# Patient Record
Sex: FEMALE | Race: WHITE | NOT HISPANIC OR LATINO | Employment: OTHER | URBAN - METROPOLITAN AREA
[De-identification: names, ages, dates, MRNs, and addresses within clinical notes are randomized per-mention and may not be internally consistent; named-entity substitution may affect disease eponyms.]

---

## 2023-05-03 DIAGNOSIS — E07.9 THYROID DISORDER: ICD-10-CM

## 2023-05-03 RX ORDER — LEVOTHYROXINE SODIUM 125 UG/1
125 TABLET ORAL DAILY
Qty: 30 TABLET | Refills: 0 | Status: SHIPPED | OUTPATIENT
Start: 2023-05-03 | End: 2023-06-12 | Stop reason: SDUPTHER

## 2023-05-03 RX ORDER — LEVOTHYROXINE SODIUM 125 UG/1
125 TABLET ORAL DAILY
COMMUNITY
End: 2023-05-03 | Stop reason: SDUPTHER

## 2023-05-04 DIAGNOSIS — E11.9 TYPE 2 DIABETES MELLITUS WITHOUT COMPLICATION, WITHOUT LONG-TERM CURRENT USE OF INSULIN (MULTI): Primary | ICD-10-CM

## 2023-05-04 RX ORDER — GLIMEPIRIDE 2 MG/1
TABLET ORAL
Qty: 90 TABLET | Refills: 0 | Status: SHIPPED | OUTPATIENT
Start: 2023-05-04 | End: 2023-08-01

## 2023-06-12 DIAGNOSIS — E07.9 THYROID DISORDER: ICD-10-CM

## 2023-06-12 RX ORDER — LEVOTHYROXINE SODIUM 125 UG/1
125 TABLET ORAL DAILY
Qty: 30 TABLET | Refills: 0 | Status: SHIPPED | OUTPATIENT
Start: 2023-06-12 | End: 2023-07-17 | Stop reason: SDUPTHER

## 2023-07-03 ENCOUNTER — OFFICE VISIT (OUTPATIENT)
Dept: PRIMARY CARE | Facility: CLINIC | Age: 73
End: 2023-07-03
Payer: MEDICARE

## 2023-07-03 VITALS
HEART RATE: 92 BPM | WEIGHT: 202.6 LBS | BODY MASS INDEX: 34.59 KG/M2 | OXYGEN SATURATION: 99 % | RESPIRATION RATE: 18 BRPM | DIASTOLIC BLOOD PRESSURE: 86 MMHG | SYSTOLIC BLOOD PRESSURE: 132 MMHG | HEIGHT: 64 IN

## 2023-07-03 DIAGNOSIS — F32.A ANXIETY AND DEPRESSION: ICD-10-CM

## 2023-07-03 DIAGNOSIS — E11.69 TYPE 2 DIABETES MELLITUS WITH OTHER SPECIFIED COMPLICATION, WITHOUT LONG-TERM CURRENT USE OF INSULIN (MULTI): ICD-10-CM

## 2023-07-03 DIAGNOSIS — E03.9 ACQUIRED HYPOTHYROIDISM: ICD-10-CM

## 2023-07-03 DIAGNOSIS — M46.96 UNSPECIFIED INFLAMMATORY SPONDYLOPATHY, LUMBAR REGION (CMS-HCC): ICD-10-CM

## 2023-07-03 DIAGNOSIS — E55.9 HYPOVITAMINOSIS D: ICD-10-CM

## 2023-07-03 DIAGNOSIS — F32.0 DEPRESSION, MAJOR, SINGLE EPISODE, MILD (CMS-HCC): ICD-10-CM

## 2023-07-03 DIAGNOSIS — F41.9 ANXIETY AND DEPRESSION: ICD-10-CM

## 2023-07-03 DIAGNOSIS — E53.8 VITAMIN B12 DEFICIENCY: Primary | ICD-10-CM

## 2023-07-03 DIAGNOSIS — E78.2 MIXED HYPERLIPIDEMIA: ICD-10-CM

## 2023-07-03 DIAGNOSIS — Z86.718 PERSONAL HISTORY OF DVT (DEEP VEIN THROMBOSIS): ICD-10-CM

## 2023-07-03 DIAGNOSIS — C68.9 UROTHELIAL CARCINOMA (MULTI): ICD-10-CM

## 2023-07-03 DIAGNOSIS — Z00.00 MEDICARE ANNUAL WELLNESS VISIT, SUBSEQUENT: ICD-10-CM

## 2023-07-03 DIAGNOSIS — I74.3 EMBOLISM AND THROMBOSIS OF ARTERIES OF THE LOWER EXTREMITIES (MULTI): ICD-10-CM

## 2023-07-03 DIAGNOSIS — E07.9 THYROID DISORDER: ICD-10-CM

## 2023-07-03 DIAGNOSIS — Z00.00 ROUTINE GENERAL MEDICAL EXAMINATION AT HEALTH CARE FACILITY: ICD-10-CM

## 2023-07-03 PROBLEM — R13.10 DYSPHAGIA: Status: RESOLVED | Noted: 2023-07-03 | Resolved: 2023-07-03

## 2023-07-03 PROBLEM — I87.1 COMPRESSION OF VEIN: Status: RESOLVED | Noted: 2023-07-03 | Resolved: 2023-07-03

## 2023-07-03 PROBLEM — J30.9 ALLERGIC RHINITIS: Status: RESOLVED | Noted: 2023-07-03 | Resolved: 2023-07-03

## 2023-07-03 PROBLEM — R51.9 HEADACHE: Status: RESOLVED | Noted: 2023-07-03 | Resolved: 2023-07-03

## 2023-07-03 PROBLEM — S82.892A ANKLE FRACTURE, LEFT: Status: RESOLVED | Noted: 2023-07-03 | Resolved: 2023-07-03

## 2023-07-03 PROBLEM — I87.1 MAY-THURNER SYNDROME: Status: ACTIVE | Noted: 2023-07-03

## 2023-07-03 PROBLEM — R00.2 PALPITATIONS: Status: RESOLVED | Noted: 2023-07-03 | Resolved: 2023-07-03

## 2023-07-03 PROBLEM — K59.00 CONSTIPATION: Status: RESOLVED | Noted: 2023-07-03 | Resolved: 2023-07-03

## 2023-07-03 PROBLEM — G51.4 FACIAL TWITCHING: Status: RESOLVED | Noted: 2023-07-03 | Resolved: 2023-07-03

## 2023-07-03 PROBLEM — E11.9 TYPE 2 DIABETES MELLITUS (MULTI): Status: ACTIVE | Noted: 2023-07-03

## 2023-07-03 PROBLEM — M54.9 CHRONIC BACK PAIN: Status: ACTIVE | Noted: 2023-07-03

## 2023-07-03 PROBLEM — R06.09 DOE (DYSPNEA ON EXERTION): Status: RESOLVED | Noted: 2023-07-03 | Resolved: 2023-07-03

## 2023-07-03 PROBLEM — R92.8 ABNORMAL MAMMOGRAM: Status: RESOLVED | Noted: 2023-07-03 | Resolved: 2023-07-03

## 2023-07-03 PROBLEM — R19.8 IRREGULAR BOWEL HABITS: Status: RESOLVED | Noted: 2023-07-03 | Resolved: 2023-07-03

## 2023-07-03 PROBLEM — K82.8 GALLBLADDER SLUDGE: Status: RESOLVED | Noted: 2023-07-03 | Resolved: 2023-07-03

## 2023-07-03 PROBLEM — K21.9 ESOPHAGEAL REFLUX: Status: RESOLVED | Noted: 2023-07-03 | Resolved: 2023-07-03

## 2023-07-03 PROBLEM — B37.9 YEAST INFECTION: Status: RESOLVED | Noted: 2023-07-03 | Resolved: 2023-07-03

## 2023-07-03 PROBLEM — G89.29 CHRONIC BACK PAIN: Status: ACTIVE | Noted: 2023-07-03

## 2023-07-03 PROBLEM — R91.8 MULTIPLE LUNG NODULES: Status: ACTIVE | Noted: 2023-07-03

## 2023-07-03 PROBLEM — M79.7 FIBROMYALGIA: Status: RESOLVED | Noted: 2023-07-03 | Resolved: 2023-07-03

## 2023-07-03 PROBLEM — R10.9 ABDOMINAL CRAMPING: Status: RESOLVED | Noted: 2023-07-03 | Resolved: 2023-07-03

## 2023-07-03 LAB — POC HEMOGLOBIN A1C: 6 % (ref 4.2–6.5)

## 2023-07-03 PROCEDURE — 1125F AMNT PAIN NOTED PAIN PRSNT: CPT | Performed by: NURSE PRACTITIONER

## 2023-07-03 PROCEDURE — 1036F TOBACCO NON-USER: CPT | Performed by: NURSE PRACTITIONER

## 2023-07-03 PROCEDURE — 1170F FXNL STATUS ASSESSED: CPT | Performed by: NURSE PRACTITIONER

## 2023-07-03 PROCEDURE — 83036 HEMOGLOBIN GLYCOSYLATED A1C: CPT | Performed by: NURSE PRACTITIONER

## 2023-07-03 PROCEDURE — G0439 PPPS, SUBSEQ VISIT: HCPCS | Performed by: NURSE PRACTITIONER

## 2023-07-03 PROCEDURE — 1159F MED LIST DOCD IN RCRD: CPT | Performed by: NURSE PRACTITIONER

## 2023-07-03 PROCEDURE — 3044F HG A1C LEVEL LT 7.0%: CPT | Performed by: NURSE PRACTITIONER

## 2023-07-03 PROCEDURE — 3075F SYST BP GE 130 - 139MM HG: CPT | Performed by: NURSE PRACTITIONER

## 2023-07-03 PROCEDURE — 3008F BODY MASS INDEX DOCD: CPT | Performed by: NURSE PRACTITIONER

## 2023-07-03 PROCEDURE — 1157F ADVNC CARE PLAN IN RCRD: CPT | Performed by: NURSE PRACTITIONER

## 2023-07-03 PROCEDURE — 99214 OFFICE O/P EST MOD 30 MIN: CPT | Performed by: NURSE PRACTITIONER

## 2023-07-03 PROCEDURE — 1160F RVW MEDS BY RX/DR IN RCRD: CPT | Performed by: NURSE PRACTITIONER

## 2023-07-03 PROCEDURE — 3079F DIAST BP 80-89 MM HG: CPT | Performed by: NURSE PRACTITIONER

## 2023-07-03 RX ORDER — MELATONIN 3 MG
3 CAPSULE ORAL NIGHTLY PRN
COMMUNITY
End: 2023-11-16 | Stop reason: ALTCHOICE

## 2023-07-03 RX ORDER — ESCITALOPRAM OXALATE 5 MG/1
5 TABLET ORAL DAILY
Qty: 30 TABLET | Refills: 2 | Status: SHIPPED | OUTPATIENT
Start: 2023-07-03 | End: 2023-11-16 | Stop reason: ALTCHOICE

## 2023-07-03 RX ORDER — PANTOPRAZOLE SODIUM 40 MG/1
40 TABLET, DELAYED RELEASE ORAL
COMMUNITY
Start: 2021-10-19

## 2023-07-03 RX ORDER — CETIRIZINE HYDROCHLORIDE 10 MG/1
1 TABLET ORAL DAILY
COMMUNITY
Start: 2022-06-03 | End: 2023-11-16 | Stop reason: ALTCHOICE

## 2023-07-03 RX ORDER — APIXABAN 5 MG/1
2.5 TABLET, FILM COATED ORAL 2 TIMES DAILY
COMMUNITY
Start: 2021-02-22

## 2023-07-03 RX ORDER — BLOOD SUGAR DIAGNOSTIC
STRIP MISCELLANEOUS
COMMUNITY
Start: 2020-02-19 | End: 2023-08-21 | Stop reason: SDUPTHER

## 2023-07-03 NOTE — PROGRESS NOTES
"Subjective   Patient ID: Latosha Larson is a 73 y.o. female who presents for Follow-up (Patient in today routine F/U and A1C check. She would also like to discuss fatigue as well as trouble staying asleep. ).    Diabetes Mellitus  Patient presents for follow up of diabetes. Current symptoms include: none. Symptoms have been well-controlled. Patient denies foot ulcerations, polydipsia, polyuria, and weight loss. Evaluation to date has included: hemoglobin A1C.  Home sugars: BGs consistently in an acceptable range. Current treatment: none. Last dilated eye exam: within the past year.  She will take glimepride if her sugar is 190 and she knows she will have carbs at her dinner.      She has been following urology for bladder cancer - did have blue light cystoscopy and re - TURBT and has had BCG.  Stated urology found another area on her bladder and she will be having surgery on 8/1/23    She has found that since last year she has had a decrease in motivation.  She has noticed that since her broken ankle and with dealing with her bladder cancer she has not wanted to do the things that she liked to do.  Denies Si         Review of Systems   All other systems reviewed and are negative.      Objective   /86   Pulse 92   Resp 18   Ht 1.626 m (5' 4\")   Wt 91.9 kg (202 lb 9.6 oz)   SpO2 99%   BMI 34.78 kg/m²     Physical Exam  Vitals and nursing note reviewed.   Constitutional:       General: She is not in acute distress.     Appearance: Normal appearance.   HENT:      Head: Normocephalic and atraumatic.      Right Ear: External ear normal.      Left Ear: External ear normal.      Mouth/Throat:      Mouth: Mucous membranes are moist.      Pharynx: Oropharynx is clear.   Eyes:      Extraocular Movements: Extraocular movements intact.      Conjunctiva/sclera: Conjunctivae normal.      Pupils: Pupils are equal, round, and reactive to light.   Neck:      Vascular: No carotid bruit.   Cardiovascular:      Rate and " Rhythm: Normal rate and regular rhythm.      Pulses: Normal pulses.      Heart sounds: Normal heart sounds.   Pulmonary:      Effort: Pulmonary effort is normal.      Breath sounds: Normal breath sounds.   Abdominal:      General: Bowel sounds are normal.      Palpations: Abdomen is soft.   Musculoskeletal:      Cervical back: Normal range of motion and neck supple.   Lymphadenopathy:      Cervical: No cervical adenopathy.   Skin:     General: Skin is warm and dry.      Capillary Refill: Capillary refill takes less than 2 seconds.   Neurological:      General: No focal deficit present.      Mental Status: She is alert and oriented to person, place, and time. Mental status is at baseline.   Psychiatric:         Mood and Affect: Mood normal.         Behavior: Behavior normal.         Thought Content: Thought content normal.         Judgment: Judgment normal.         Assessment/Plan   Problem List Items Addressed This Visit       Mixed hyperlipidemia    Type 2 diabetes mellitus (CMS/HCC)    Relevant Orders    POCT glycosylated hemoglobin (Hb A1C) manually resulted (Completed)    CBC    Comprehensive Metabolic Panel    Lipid Panel    Iron and TIBC    Vitamin B12 deficiency - Primary    Relevant Orders    Vitamin B12    Iron and TIBC     Other Visit Diagnoses       Acquired hypothyroidism        Relevant Orders    TSH with reflex to Free T4 if abnormal    Iron and TIBC    Hypovitaminosis D        Relevant Orders    Vitamin D, Total    Iron and TIBC    Anxiety and depression        Relevant Medications    escitalopram (Lexapro) 5 mg tablet

## 2023-07-03 NOTE — PATIENT INSTRUCTIONS
I want to check lab work to see if anything is going on that we need to look at  I also want to start medication to help with your motivation - called lexapro.  Take it at the same time everyday so that you get used to taking it  Follow up in 6 weeks

## 2023-07-07 ENCOUNTER — APPOINTMENT (OUTPATIENT)
Dept: LAB | Facility: LAB | Age: 73
End: 2023-07-07
Payer: MEDICARE

## 2023-07-09 PROBLEM — F32.A ANXIETY AND DEPRESSION: Status: ACTIVE | Noted: 2023-07-09

## 2023-07-09 PROBLEM — Z00.00 MEDICARE ANNUAL WELLNESS VISIT, SUBSEQUENT: Status: ACTIVE | Noted: 2023-07-09

## 2023-07-09 PROBLEM — E03.9 ACQUIRED HYPOTHYROIDISM: Status: ACTIVE | Noted: 2023-07-09

## 2023-07-09 PROBLEM — M46.96 UNSPECIFIED INFLAMMATORY SPONDYLOPATHY, LUMBAR REGION (CMS-HCC): Status: ACTIVE | Noted: 2023-07-09

## 2023-07-09 PROBLEM — I74.3 EMBOLISM AND THROMBOSIS OF ARTERIES OF THE LOWER EXTREMITIES (MULTI): Status: ACTIVE | Noted: 2023-07-09

## 2023-07-09 PROBLEM — F41.9 ANXIETY AND DEPRESSION: Status: ACTIVE | Noted: 2023-07-09

## 2023-07-09 ASSESSMENT — ACTIVITIES OF DAILY LIVING (ADL)
DRESSING: INDEPENDENT
TAKING_MEDICATION: INDEPENDENT
BATHING: INDEPENDENT
MANAGING_FINANCES: INDEPENDENT
GROCERY_SHOPPING: INDEPENDENT
DOING_HOUSEWORK: INDEPENDENT

## 2023-07-09 ASSESSMENT — PATIENT HEALTH QUESTIONNAIRE - PHQ9
1. LITTLE INTEREST OR PLEASURE IN DOING THINGS: SEVERAL DAYS
2. FEELING DOWN, DEPRESSED OR HOPELESS: SEVERAL DAYS
10. IF YOU CHECKED OFF ANY PROBLEMS, HOW DIFFICULT HAVE THESE PROBLEMS MADE IT FOR YOU TO DO YOUR WORK, TAKE CARE OF THINGS AT HOME, OR GET ALONG WITH OTHER PEOPLE: SOMEWHAT DIFFICULT
SUM OF ALL RESPONSES TO PHQ9 QUESTIONS 1 AND 2: 2

## 2023-07-10 NOTE — PROGRESS NOTES
"Subjective   Reason for Visit: Latosha Larson is an 73 y.o. female here for a Medicare Wellness visit.     Past Medical, Surgical, and Family History reviewed and updated in chart.    Reviewed all medications by prescribing practitioner or clinical pharmacist (such as prescriptions, OTCs, herbal therapies and supplements) and documented in the medical record.    See other note for HPI    Presents for follow up and AMW    Colonoscopy 2021  Mammogram 2022  Dexa 2021 and normal   PPSV 2017        Patient Care Team:  FAITH Villarreal as PCP - General  FAITH Villarreal as PCP - [employer] PCP     Review of Systems   All other systems reviewed and are negative.      Objective   Vitals:  /86   Pulse 92   Resp 18   Ht 1.626 m (5' 4\")   Wt 91.9 kg (202 lb 9.6 oz)   SpO2 99%   BMI 34.78 kg/m²       Physical Exam  Vitals and nursing note reviewed.   Constitutional:       General: She is not in acute distress.     Appearance: Normal appearance.   HENT:      Head: Normocephalic and atraumatic.      Right Ear: External ear normal.      Left Ear: External ear normal.      Nose: Nose normal.      Mouth/Throat:      Mouth: Mucous membranes are moist.      Pharynx: Oropharynx is clear.   Eyes:      Extraocular Movements: Extraocular movements intact.      Conjunctiva/sclera: Conjunctivae normal.      Pupils: Pupils are equal, round, and reactive to light.   Cardiovascular:      Rate and Rhythm: Normal rate and regular rhythm.      Pulses: Normal pulses.      Heart sounds: Normal heart sounds.   Pulmonary:      Effort: Pulmonary effort is normal.      Breath sounds: Normal breath sounds.   Skin:     General: Skin is warm and dry.   Neurological:      General: No focal deficit present.      Mental Status: She is alert and oriented to person, place, and time. Mental status is at baseline.   Psychiatric:         Mood and Affect: Mood normal.         Behavior: Behavior normal.         " Thought Content: Thought content normal.         Judgment: Judgment normal.         Assessment/Plan   Problem List Items Addressed This Visit       Depression, major, single episode, mild (CMS/HCC)    Overview     Has been having increased feelings of not wanting to do things that she wants like to do  Suspected recurrence of bladder cancer has weighed heavily on her mood  Start escitalopram  Side effects discussed  Follow-up in 6 weeks         Current Assessment & Plan     Has been having increased feelings of not wanting to do things that she wants like to do  Suspected recurrence of bladder cancer has weighed heavily on her mood  Start escitalopram  Side effects discussed  Follow-up in 6 weeks         Thyroid disorder    Overview     Stable  TSH  We will adjust levothyroxine based on results         Current Assessment & Plan     Stable  TSH  We will adjust levothyroxine based on results         Mixed hyperlipidemia    Overview     Stable  Lipid panel         Current Assessment & Plan     Stable  Lipid panel         Personal history of DVT (deep vein thrombosis)    Overview     Stable  Continue Eliquis twice daily         Current Assessment & Plan     Stable  Continue Eliquis twice daily         Type 2 diabetes mellitus (CMS/HCC)    Overview     A1c 6.0  Continue glimepiride  Continue to monitor sugars  Continue to monitor diet  CBC, CMP, lipid panel  Follow-up in 6 months         Current Assessment & Plan     A1c 6.0  Continue glimepiride  Continue to monitor sugars  Continue to monitor diet  CBC, CMP, lipid panel  Follow-up in 6 months         Relevant Orders    POCT glycosylated hemoglobin (Hb A1C) manually resulted (Completed)    CBC    Comprehensive Metabolic Panel    Lipid Panel    Iron and TIBC    Urothelial carcinoma (CMS/HCC)    Overview     Follow-up with urology  Did find an area and will be having surgery in August         Current Assessment & Plan     Follow-up with urology  Did find an area and will  be having surgery in August         Vitamin B12 deficiency - Primary    Relevant Orders    Vitamin B12    Iron and TIBC    Unspecified inflammatory spondylopathy, lumbar region (CMS/HCC)    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)    Current Assessment & Plan     Stable  Continue eliquis twice a day         Anxiety and depression    Relevant Medications    escitalopram (Lexapro) 5 mg tablet    Acquired hypothyroidism    Relevant Orders    TSH with reflex to Free T4 if abnormal    Iron and TIBC    Medicare annual wellness visit, subsequent    Overview     - UTD with vaccine   -  colonoscopy 2022  - mammogram 2022  - dexa 2021 -  living will reviewed with patient           Current Assessment & Plan     - UTD with vaccine   -  colonoscopy 2022  - mammogram 2022  - dexa 2021  -  living will reviewed with patient          Other Visit Diagnoses       Hypovitaminosis D        Relevant Orders    Vitamin D, Total    Iron and TIBC    Routine general medical examination at health care facility

## 2023-07-10 NOTE — ASSESSMENT & PLAN NOTE
- UTD with vaccine   -  colonoscopy 2022  - mammogram 2022  - dexa 2021  -  living will reviewed with patient

## 2023-07-10 NOTE — ASSESSMENT & PLAN NOTE
A1c 6.0  Continue glimepiride  Continue to monitor sugars  Continue to monitor diet  CBC, CMP, lipid panel  Follow-up in 6 months

## 2023-07-10 NOTE — ASSESSMENT & PLAN NOTE
Has been having increased feelings of not wanting to do things that she wants like to do  Suspected recurrence of bladder cancer has weighed heavily on her mood  Start escitalopram  Side effects discussed  Follow-up in 6 weeks

## 2023-07-11 ENCOUNTER — LAB (OUTPATIENT)
Dept: LAB | Facility: LAB | Age: 73
End: 2023-07-11
Payer: MEDICARE

## 2023-07-11 DIAGNOSIS — E53.8 VITAMIN B12 DEFICIENCY: ICD-10-CM

## 2023-07-11 DIAGNOSIS — E55.9 HYPOVITAMINOSIS D: ICD-10-CM

## 2023-07-11 DIAGNOSIS — E11.69 TYPE 2 DIABETES MELLITUS WITH OTHER SPECIFIED COMPLICATION, WITHOUT LONG-TERM CURRENT USE OF INSULIN (MULTI): ICD-10-CM

## 2023-07-11 DIAGNOSIS — E03.9 ACQUIRED HYPOTHYROIDISM: ICD-10-CM

## 2023-07-11 LAB
ALANINE AMINOTRANSFERASE (SGPT) (U/L) IN SER/PLAS: 18 U/L (ref 7–45)
ALBUMIN (G/DL) IN SER/PLAS: 4.3 G/DL (ref 3.4–5)
ALKALINE PHOSPHATASE (U/L) IN SER/PLAS: 62 U/L (ref 33–136)
ANION GAP IN SER/PLAS: 14 MMOL/L (ref 10–20)
ASPARTATE AMINOTRANSFERASE (SGOT) (U/L) IN SER/PLAS: 17 U/L (ref 9–39)
BILIRUBIN TOTAL (MG/DL) IN SER/PLAS: 0.9 MG/DL (ref 0–1.2)
CALCIDIOL (25 OH VITAMIN D3) (NG/ML) IN SER/PLAS: 41 NG/ML
CALCIUM (MG/DL) IN SER/PLAS: 10.6 MG/DL (ref 8.6–10.6)
CARBON DIOXIDE, TOTAL (MMOL/L) IN SER/PLAS: 22 MMOL/L (ref 21–32)
CHLORIDE (MMOL/L) IN SER/PLAS: 108 MMOL/L (ref 98–107)
CHOLESTEROL (MG/DL) IN SER/PLAS: 208 MG/DL (ref 0–199)
CHOLESTEROL IN HDL (MG/DL) IN SER/PLAS: 39.6 MG/DL
CHOLESTEROL/HDL RATIO: 5.3
COBALAMIN (VITAMIN B12) (PG/ML) IN SER/PLAS: 686 PG/ML (ref 211–911)
CREATININE (MG/DL) IN SER/PLAS: 0.95 MG/DL (ref 0.5–1.05)
ERYTHROCYTE DISTRIBUTION WIDTH (RATIO) BY AUTOMATED COUNT: 14.1 % (ref 11.5–14.5)
ERYTHROCYTE MEAN CORPUSCULAR HEMOGLOBIN CONCENTRATION (G/DL) BY AUTOMATED: 32.1 G/DL (ref 32–36)
ERYTHROCYTE MEAN CORPUSCULAR VOLUME (FL) BY AUTOMATED COUNT: 94 FL (ref 80–100)
ERYTHROCYTES (10*6/UL) IN BLOOD BY AUTOMATED COUNT: 4.39 X10E12/L (ref 4–5.2)
GFR FEMALE: 63 ML/MIN/1.73M2
GLUCOSE (MG/DL) IN SER/PLAS: 166 MG/DL (ref 74–99)
HEMATOCRIT (%) IN BLOOD BY AUTOMATED COUNT: 41.4 % (ref 36–46)
HEMOGLOBIN (G/DL) IN BLOOD: 13.3 G/DL (ref 12–16)
IRON (UG/DL) IN SER/PLAS: 113 UG/DL (ref 35–150)
IRON BINDING CAPACITY (UG/DL) IN SER/PLAS: 326 UG/DL (ref 240–445)
IRON SATURATION (%) IN SER/PLAS: 35 % (ref 25–45)
LDL: 113 MG/DL (ref 0–99)
LEUKOCYTES (10*3/UL) IN BLOOD BY AUTOMATED COUNT: 7.1 X10E9/L (ref 4.4–11.3)
NON HDL CHOLESTEROL: 168 MG/DL
NRBC (PER 100 WBCS) BY AUTOMATED COUNT: 0 /100 WBC (ref 0–0)
PLATELETS (10*3/UL) IN BLOOD AUTOMATED COUNT: 324 X10E9/L (ref 150–450)
POTASSIUM (MMOL/L) IN SER/PLAS: 4.5 MMOL/L (ref 3.5–5.3)
PROTEIN TOTAL: 6.6 G/DL (ref 6.4–8.2)
SODIUM (MMOL/L) IN SER/PLAS: 139 MMOL/L (ref 136–145)
THYROTROPIN (MIU/L) IN SER/PLAS BY DETECTION LIMIT <= 0.05 MIU/L: 0.27 MIU/L (ref 0.44–3.98)
THYROXINE (T4) FREE (NG/DL) IN SER/PLAS: 1.68 NG/DL (ref 0.78–1.48)
TRIGLYCERIDE (MG/DL) IN SER/PLAS: 275 MG/DL (ref 0–149)
UREA NITROGEN (MG/DL) IN SER/PLAS: 14 MG/DL (ref 6–23)
VLDL: 55 MG/DL (ref 0–40)

## 2023-07-11 PROCEDURE — 83540 ASSAY OF IRON: CPT

## 2023-07-11 PROCEDURE — 80053 COMPREHEN METABOLIC PANEL: CPT

## 2023-07-11 PROCEDURE — 84439 ASSAY OF FREE THYROXINE: CPT

## 2023-07-11 PROCEDURE — 36415 COLL VENOUS BLD VENIPUNCTURE: CPT

## 2023-07-11 PROCEDURE — 83550 IRON BINDING TEST: CPT

## 2023-07-11 PROCEDURE — 82607 VITAMIN B-12: CPT

## 2023-07-11 PROCEDURE — 85027 COMPLETE CBC AUTOMATED: CPT

## 2023-07-11 PROCEDURE — 82306 VITAMIN D 25 HYDROXY: CPT

## 2023-07-11 PROCEDURE — 84443 ASSAY THYROID STIM HORMONE: CPT

## 2023-07-11 PROCEDURE — 80061 LIPID PANEL: CPT

## 2023-07-17 DIAGNOSIS — E07.9 THYROID DISORDER: ICD-10-CM

## 2023-07-17 RX ORDER — LEVOTHYROXINE SODIUM 125 UG/1
TABLET ORAL
Qty: 90 TABLET | Refills: 1 | Status: SHIPPED | OUTPATIENT
Start: 2023-07-17 | End: 2024-02-27 | Stop reason: ALTCHOICE

## 2023-08-01 ENCOUNTER — HOSPITAL ENCOUNTER (OUTPATIENT)
Dept: DATA CONVERSION | Facility: HOSPITAL | Age: 73
End: 2023-08-01
Attending: STUDENT IN AN ORGANIZED HEALTH CARE EDUCATION/TRAINING PROGRAM | Admitting: STUDENT IN AN ORGANIZED HEALTH CARE EDUCATION/TRAINING PROGRAM
Payer: MEDICARE

## 2023-08-01 DIAGNOSIS — E11.9 TYPE 2 DIABETES MELLITUS WITHOUT COMPLICATION, WITHOUT LONG-TERM CURRENT USE OF INSULIN (MULTI): ICD-10-CM

## 2023-08-01 DIAGNOSIS — C67.3 MALIGNANT NEOPLASM OF ANTERIOR WALL OF BLADDER (MULTI): ICD-10-CM

## 2023-08-01 DIAGNOSIS — C67.2 MALIGNANT NEOPLASM OF LATERAL WALL OF BLADDER (MULTI): ICD-10-CM

## 2023-08-01 LAB — POCT GLUCOSE: 186 MG/DL (ref 74–99)

## 2023-08-01 RX ORDER — GLIMEPIRIDE 2 MG/1
TABLET ORAL
Qty: 90 TABLET | Refills: 1 | Status: SHIPPED | OUTPATIENT
Start: 2023-08-01 | End: 2024-03-05 | Stop reason: HOSPADM

## 2023-08-03 ENCOUNTER — TELEPHONE (OUTPATIENT)
Dept: PRIMARY CARE | Facility: CLINIC | Age: 73
End: 2023-08-03
Payer: MEDICARE

## 2023-08-03 DIAGNOSIS — F32.0 DEPRESSION, MAJOR, SINGLE EPISODE, MILD (CMS-HCC): Primary | ICD-10-CM

## 2023-08-03 RX ORDER — SERTRALINE HYDROCHLORIDE 25 MG/1
25 TABLET, FILM COATED ORAL DAILY
Qty: 30 TABLET | Refills: 1 | Status: SHIPPED | OUTPATIENT
Start: 2023-08-03 | End: 2023-10-05 | Stop reason: SDUPTHER

## 2023-08-09 LAB
COMPLETE PATHOLOGY REPORT: NORMAL
CONVERTED CLINICAL DIAGNOSIS-HISTORY: NORMAL
CONVERTED DIAGNOSIS COMMENT: NORMAL
CONVERTED FINAL DIAGNOSIS: NORMAL
CONVERTED FINAL REPORT PDF LINK TO COPY AND PASTE: NORMAL
CONVERTED GROSS DESCRIPTION: NORMAL

## 2023-08-21 DIAGNOSIS — E11.69 TYPE 2 DIABETES MELLITUS WITH OTHER SPECIFIED COMPLICATION, WITHOUT LONG-TERM CURRENT USE OF INSULIN (MULTI): ICD-10-CM

## 2023-08-21 RX ORDER — BLOOD SUGAR DIAGNOSTIC
STRIP MISCELLANEOUS
Qty: 200 STRIP | Refills: 1 | Status: SHIPPED | OUTPATIENT
Start: 2023-08-21 | End: 2024-02-19

## 2023-09-29 VITALS — HEIGHT: 64 IN | WEIGHT: 199.74 LBS | BODY MASS INDEX: 34.1 KG/M2

## 2023-09-30 NOTE — H&P
History & Physical Reviewed:   I have reviewed the History and Physical dated:  14-Jul-2023   History and Physical reviewed and relevant findings noted. Patient examined to review pertinent physical  findings.: No significant changes   Home Medications Reviewed: no changes noted   Allergies Reviewed: no changes noted       ERAS (Enhanced Recovery After Surgery):  ·  ERAS Patient: no     Consent:   COVID-19 Consent:  ·  COVID-19 Risk Consent Surgeon has reviewed key risks related to the risk of valerie COVID-19 and if they contract COVID-19 what the risks are.       Electronic Signatures:  Janell Etienne (PA (PHYSICIAN))  (Signed 31-Jul-2023 07:45)   Authored: History & Physical Reviewed, ERAS, Consent,  Note Completion      Last Updated: 31-Jul-2023 07:45 by Janell Etienne (PA (PHYSICIAN))

## 2023-09-30 NOTE — H&P
History & Physical Reviewed:   I have reviewed the History and Physical dated:  01-Aug-2023   History and Physical reviewed and relevant findings noted. Patient examined to review pertinent physical  findings.: No significant changes   Home Medications Reviewed: no changes noted   Allergies Reviewed: no changes noted       ERAS (Enhanced Recovery After Surgery):  ·  ERAS Patient: no     Consent:   COVID-19 Consent:  ·  COVID-19 Risk Consent Surgeon has reviewed key risks related to the risk of valerie COVID-19 and if they contract COVID-19 what the risks are.     Attestation:   Note Completion:  I am a:  Resident/Fellow   Attending Attestation I saw and evaluated the patient.  I personally obtained the key and critical portions of the history and physical exam or was physically present for key and  critical portions performed by the resident/fellow. I reviewed the resident/fellow?s documentation and discussed the patient with the resident/fellow.  I agree with the resident/fellow?s medical decision making as documented in the note.     I personally evaluated the patient on 01-Aug-2023         Electronic Signatures:  Hardeep Acuña (Resident))  (Signed 01-Aug-2023 10:13)   Authored: History & Physical Reviewed, ERAS, Consent,  Note Completion  Stefany Phipps)  (Signed 04-Aug-2023 12:55)   Authored: Note Completion   Co-Signer: History & Physical Reviewed, ERAS, Consent, Note Completion      Last Updated: 04-Aug-2023 12:55 by Stefany Phipps)

## 2023-10-01 NOTE — OP NOTE
PROCEDURE DETAILS    Preoperative Diagnosis:  Bladder cancer  Postoperative Diagnosis:  Bladder cancer  Surgeon: Stefany Phipps  Resident/Fellow/Other Assistant: Hardeep Acuña    Procedure:  1. INTRAVESICAL INSTILLATION OF CYSVIEW  2. BLUE LIGHT CYSTOSCOPY  3. TRANSURETHRAL RESECTION OF BLADDER TUMOR ( TURBT)     Anesthesia: [place] Dash  Estimated Blood Loss: 0  Findings: Reactive area on left lateral wall 2 cm in size  Small lesion on right anterior wall  Specimens(s) Collected: yes,  left posterolateral wall tumor  anterior bladder biopsy   Complications: none  Drains and/or Catheters: 16 Fr Busch  Patient Returned To/Condition: PACU/stable        Operative Report:   Patient was  consented and antibiotics were started on-call to the OR.  In the preop area, a 16 Fr Busch catheter was placed, bladder decompressed, then 100mg hexaminolevulonic acid distilled in 50cc NSS were instilled in the bladder, and Busch was clamped.  In the operating room, under general anesthesia, patient placed in dorsolithotomy position, Busch catheter removed and genitalia was prepped and draped in usual manner.  No 22 cystoscope was inserted down the urethra into the bladder, and cystoscopy revealed the area of prior resection on the posterior wall. There was a 2cm erythematous area with papillary growth on the left posterolateral wall, and another small area  on the anterior wall, both had hyperactivity on the blue light.  Using the 26 resectoscope, with the loop on the working element, the area of tumor was resected down to the muscle and fat layer.  This was done the whole surface of the tumor. Another resection was performed for the minimal lesion on the anterior bladder  wall. Extensive hemostasis was performed for the underlying base of the tumor as well as the surrounding mucosa.  All resected chips were removed and sent for pathological examination.   No 16 French Busch catheter was inserted.  Patient was awakened  and transferred to PACU in stable condition.                            Electronic Signatures:  Stefany Phipps)  (Signed 01-Aug-2023 13:53)   Authored: Post-Operative Note, Chart Review, Note Completion      Last Updated: 01-Aug-2023 13:53 by Stefany Phipps)

## 2023-10-03 ENCOUNTER — CLINICAL SUPPORT (OUTPATIENT)
Dept: UROLOGY | Facility: CLINIC | Age: 73
End: 2023-10-03
Payer: MEDICARE

## 2023-10-03 VITALS — TEMPERATURE: 97.7 F

## 2023-10-03 DIAGNOSIS — C67.9 MALIGNANT NEOPLASM OF URINARY BLADDER, UNSPECIFIED SITE (MULTI): Primary | ICD-10-CM

## 2023-10-03 LAB
POC APPEARANCE, URINE: CLEAR
POC BILIRUBIN, URINE: NEGATIVE
POC BLOOD, URINE: ABNORMAL
POC COLOR, URINE: YELLOW
POC GLUCOSE, URINE: NEGATIVE MG/DL
POC KETONES, URINE: NEGATIVE MG/DL
POC LEUKOCYTES, URINE: NEGATIVE
POC NITRITE,URINE: NEGATIVE
POC PH, URINE: 5.5 PH
POC PROTEIN, URINE: NEGATIVE MG/DL
POC SPECIFIC GRAVITY, URINE: 1.01
POC UROBILINOGEN, URINE: 0.2 EU/DL

## 2023-10-03 PROCEDURE — 51720 TREATMENT OF BLADDER LESION: CPT | Performed by: STUDENT IN AN ORGANIZED HEALTH CARE EDUCATION/TRAINING PROGRAM

## 2023-10-05 DIAGNOSIS — F32.0 DEPRESSION, MAJOR, SINGLE EPISODE, MILD (CMS-HCC): ICD-10-CM

## 2023-10-05 RX ORDER — SERTRALINE HYDROCHLORIDE 25 MG/1
25 TABLET, FILM COATED ORAL DAILY
Qty: 90 TABLET | Refills: 0 | Status: SHIPPED | OUTPATIENT
Start: 2023-10-05 | End: 2023-11-16 | Stop reason: ALTCHOICE

## 2023-10-08 PROBLEM — J32.9 CHRONIC SINUSITIS: Status: ACTIVE | Noted: 2023-10-08

## 2023-10-08 PROBLEM — N17.9 ACUTE RENAL FAILURE SYNDROME (CMS-HCC): Status: ACTIVE | Noted: 2023-10-08

## 2023-10-08 PROBLEM — G47.01 INSOMNIA DUE TO MEDICAL CONDITION: Status: ACTIVE | Noted: 2023-10-08

## 2023-10-08 PROBLEM — S93.06XA CLOSED DISLOCATION OF ANKLE: Status: ACTIVE | Noted: 2023-10-08

## 2023-10-08 PROBLEM — N32.89 IRRITABLE BLADDER: Status: ACTIVE | Noted: 2023-10-08

## 2023-10-08 PROBLEM — H52.7 REFRACTIVE ERRORS: Status: ACTIVE | Noted: 2023-10-08

## 2023-10-08 PROBLEM — A41.9 SEPSIS (MULTI): Status: ACTIVE | Noted: 2023-10-08

## 2023-10-08 PROBLEM — E66.9 CLASS 1 OBESITY WITH BODY MASS INDEX (BMI) OF 34.0 TO 34.9 IN ADULT: Status: ACTIVE | Noted: 2023-10-08

## 2023-10-08 PROBLEM — M1A.09X0 IDIOPATHIC CHRONIC GOUT OF MULTIPLE SITES WITHOUT TOPHUS: Status: ACTIVE | Noted: 2023-10-08

## 2023-10-08 PROBLEM — M70.51 BURSITIS OF RIGHT KNEE: Status: ACTIVE | Noted: 2023-10-08

## 2023-10-08 PROBLEM — Z96.1 ARTIFICIAL LENS PRESENT: Status: ACTIVE | Noted: 2023-10-08

## 2023-10-08 PROBLEM — K58.0 IRRITABLE BOWEL SYNDROME WITH DIARRHEA: Status: ACTIVE | Noted: 2023-10-08

## 2023-10-08 PROBLEM — F41.9 ANXIETY: Status: ACTIVE | Noted: 2023-10-08

## 2023-10-08 PROBLEM — D84.1: Status: ACTIVE | Noted: 2023-10-08

## 2023-10-08 PROBLEM — M35.9 SYSTEMIC INVOLVEMENT OF CONNECTIVE TISSUE, UNSPECIFIED (MULTI): Status: ACTIVE | Noted: 2023-10-08

## 2023-10-08 PROBLEM — E83.52 HYPERCALCEMIA: Status: ACTIVE | Noted: 2023-10-08

## 2023-10-08 PROBLEM — R76.0 RAISED ANTIBODY TITER: Status: ACTIVE | Noted: 2023-10-08

## 2023-10-08 PROBLEM — E66.09 OTHER OBESITY DUE TO EXCESS CALORIES: Status: ACTIVE | Noted: 2023-10-08

## 2023-10-08 PROBLEM — N32.89 BLADDER MASS: Status: ACTIVE | Noted: 2023-10-08

## 2023-10-08 PROBLEM — R73.09 BLOOD GLUCOSE ABNORMAL: Status: ACTIVE | Noted: 2023-10-08

## 2023-10-08 PROBLEM — R41.3 MEMORY DIFFICULTY: Status: ACTIVE | Noted: 2023-10-08

## 2023-10-08 PROBLEM — E06.3 HASHIMOTO'S DISEASE: Status: ACTIVE | Noted: 2023-10-08

## 2023-10-08 PROBLEM — N20.0 KIDNEY STONE: Status: ACTIVE | Noted: 2023-10-08

## 2023-10-08 PROBLEM — E66.811 CLASS 1 OBESITY WITH BODY MASS INDEX (BMI) OF 34.0 TO 34.9 IN ADULT: Status: ACTIVE | Noted: 2023-10-08

## 2023-10-08 PROBLEM — M41.80 DEXTROSCOLIOSIS: Status: ACTIVE | Noted: 2023-10-08

## 2023-10-08 PROBLEM — R53.82 CHRONIC FATIGUE: Status: ACTIVE | Noted: 2023-10-08

## 2023-10-08 PROBLEM — R40.0 HAS DAYTIME DROWSINESS: Status: ACTIVE | Noted: 2023-10-08

## 2023-10-08 PROBLEM — E87.20 LACTIC ACIDOSIS: Status: ACTIVE | Noted: 2023-10-08

## 2023-10-08 PROBLEM — M47.816 LUMBAR SPONDYLOSIS: Status: ACTIVE | Noted: 2023-10-08

## 2023-10-08 PROBLEM — H35.3131 EARLY DRY STAGE NONEXUDATIVE AGE-RELATED MACULAR DEGENERATION OF BOTH EYES: Status: ACTIVE | Noted: 2023-10-08

## 2023-10-08 PROBLEM — S82.853A CLOSED TRIMALLEOLAR FRACTURE: Status: ACTIVE | Noted: 2023-10-08

## 2023-10-08 PROBLEM — E06.3 AUTOIMMUNE THYROIDITIS: Status: ACTIVE | Noted: 2023-10-08

## 2023-10-08 PROBLEM — R06.83 SNORING: Status: ACTIVE | Noted: 2023-10-08

## 2023-10-08 PROBLEM — E55.9 VITAMIN D DEFICIENCY: Status: ACTIVE | Noted: 2023-10-08

## 2023-10-08 PROBLEM — I10 HYPERTENSIVE DISORDER: Status: ACTIVE | Noted: 2023-10-08

## 2023-10-08 PROBLEM — I82.409 DEEP VENOUS THROMBOSIS (MULTI): Status: ACTIVE | Noted: 2023-10-08

## 2023-10-08 PROBLEM — M19.90 DEGENERATIVE JOINT DISEASE: Status: ACTIVE | Noted: 2023-10-08

## 2023-10-08 PROBLEM — E34.9 HORMONE IMBALANCE: Status: ACTIVE | Noted: 2023-10-08
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
## 2023-10-08 PROBLEM — R60.0 LOWER LEG EDEMA: Status: ACTIVE | Noted: 2023-10-08

## 2023-10-08 PROBLEM — M17.0 PRIMARY OSTEOARTHRITIS OF KNEES, BILATERAL: Status: ACTIVE | Noted: 2023-10-08

## 2023-10-08 PROBLEM — K80.50 BILIARY COLIC: Status: ACTIVE | Noted: 2023-10-08

## 2023-10-08 RX ORDER — PHENYLPROPANOLAMINE/CLEMASTINE 75-1.34MG
400 TABLET, EXTENDED RELEASE ORAL AS NEEDED
COMMUNITY
End: 2024-03-05 | Stop reason: HOSPADM

## 2023-10-08 RX ORDER — NAPROXEN SODIUM 220 MG/1
1 TABLET ORAL DAILY
COMMUNITY

## 2023-10-08 RX ORDER — CHLORHEXIDINE GLUCONATE ORAL RINSE 1.2 MG/ML
SOLUTION DENTAL
COMMUNITY
Start: 2023-07-20 | End: 2024-03-05 | Stop reason: HOSPADM

## 2023-10-08 RX ORDER — SENNOSIDES 25 MG/1
TABLET, FILM COATED ORAL
COMMUNITY

## 2023-10-08 RX ORDER — TRAMADOL HYDROCHLORIDE 50 MG/1
50 TABLET ORAL EVERY 4 HOURS PRN
COMMUNITY
Start: 2022-09-17 | End: 2023-11-16 | Stop reason: ALTCHOICE

## 2023-10-10 ENCOUNTER — CLINICAL SUPPORT (OUTPATIENT)
Dept: UROLOGY | Facility: CLINIC | Age: 73
End: 2023-10-10
Payer: MEDICARE

## 2023-10-10 DIAGNOSIS — C67.9 MALIGNANT NEOPLASM OF URINARY BLADDER, UNSPECIFIED SITE (MULTI): Primary | ICD-10-CM

## 2023-10-10 PROCEDURE — 51720 TREATMENT OF BLADDER LESION: CPT | Performed by: STUDENT IN AN ORGANIZED HEALTH CARE EDUCATION/TRAINING PROGRAM

## 2023-10-11 VITALS — TEMPERATURE: 96.5 F

## 2023-10-11 LAB
POC APPEARANCE, URINE: CLEAR
POC BILIRUBIN, URINE: NEGATIVE
POC BLOOD, URINE: ABNORMAL
POC COLOR, URINE: YELLOW
POC GLUCOSE, URINE: ABNORMAL MG/DL
POC KETONES, URINE: NEGATIVE MG/DL
POC LEUKOCYTES, URINE: NEGATIVE
POC NITRITE,URINE: NEGATIVE
POC PH, URINE: 5 PH
POC PROTEIN, URINE: NEGATIVE MG/DL
POC SPECIFIC GRAVITY, URINE: 1.02
POC UROBILINOGEN, URINE: 0.2 EU/DL

## 2023-10-11 NOTE — PROGRESS NOTES
Patient here today for BCG Instillation, Bacillus Calmette Mahendra. Patient has been consented for instillation. BCG Instillation through a 14 Fr catheter, PVR <50ml of clear yellow urine. Patient counseled to hold medication in bladder for two hours with activity and verbalized understand of disposal of urine protocol six hours post void. Patient was told to call the office should any concerns/complications occur after instillation.      LOT: [id number]  EXP: 05/18/2024

## 2023-10-11 NOTE — PROGRESS NOTES
Patient here today for BCG Instillation, Bacillus Calmette Mahendra. Patient has been consented for instillation. BCG Instillation through a 14 Fr catheter, PVR <50ml of clear yellow urine. Patient counseled to hold medication in bladder for two hours with activity and verbalized understand of disposal of urine protocol six hours post void. Patient was told to call the office should any concerns/complications occur after instillation.      LOT: [id number]  EXP: 05/18/2024

## 2023-10-17 ENCOUNTER — OFFICE VISIT (OUTPATIENT)
Dept: UROLOGY | Facility: CLINIC | Age: 73
End: 2023-10-17
Payer: MEDICARE

## 2023-10-17 VITALS — TEMPERATURE: 97.4 F

## 2023-10-17 DIAGNOSIS — C68.9 UROTHELIAL CARCINOMA (MULTI): Primary | ICD-10-CM

## 2023-10-17 LAB
POC APPEARANCE, URINE: CLEAR
POC BILIRUBIN, URINE: NEGATIVE
POC BLOOD, URINE: NEGATIVE
POC COLOR, URINE: YELLOW
POC GLUCOSE, URINE: ABNORMAL MG/DL
POC KETONES, URINE: NEGATIVE MG/DL
POC LEUKOCYTES, URINE: NEGATIVE
POC NITRITE,URINE: NEGATIVE
POC PH, URINE: 5.5 PH
POC PROTEIN, URINE: ABNORMAL MG/DL
POC SPECIFIC GRAVITY, URINE: >=1.03
POC UROBILINOGEN, URINE: 0.2 EU/DL

## 2023-10-17 PROCEDURE — 3008F BODY MASS INDEX DOCD: CPT | Performed by: STUDENT IN AN ORGANIZED HEALTH CARE EDUCATION/TRAINING PROGRAM

## 2023-10-17 PROCEDURE — 1159F MED LIST DOCD IN RCRD: CPT | Performed by: STUDENT IN AN ORGANIZED HEALTH CARE EDUCATION/TRAINING PROGRAM

## 2023-10-17 PROCEDURE — 3044F HG A1C LEVEL LT 7.0%: CPT | Performed by: STUDENT IN AN ORGANIZED HEALTH CARE EDUCATION/TRAINING PROGRAM

## 2023-10-17 PROCEDURE — 51720 TREATMENT OF BLADDER LESION: CPT | Performed by: STUDENT IN AN ORGANIZED HEALTH CARE EDUCATION/TRAINING PROGRAM

## 2023-10-17 PROCEDURE — 1125F AMNT PAIN NOTED PAIN PRSNT: CPT | Performed by: STUDENT IN AN ORGANIZED HEALTH CARE EDUCATION/TRAINING PROGRAM

## 2023-10-17 PROCEDURE — 1036F TOBACCO NON-USER: CPT | Performed by: STUDENT IN AN ORGANIZED HEALTH CARE EDUCATION/TRAINING PROGRAM

## 2023-10-17 PROCEDURE — 1160F RVW MEDS BY RX/DR IN RCRD: CPT | Performed by: STUDENT IN AN ORGANIZED HEALTH CARE EDUCATION/TRAINING PROGRAM

## 2023-10-17 NOTE — PROGRESS NOTES
Patient here today for BCG Instillation, Bacillus Calmette Mahendra. Patient has been consented for instillation. BCG Instillation through a 14 Fr catheter, PVR <50ml of clear yellow urine. Patient counseled to hold medication in bladder for two hours with activity and verbalized understand of disposal of urine protocol six hours post void. Patient was told to call the office should any concerns/complications occur after instillation.      LOT: [id number]  EXP: 05/18/24

## 2023-10-24 ENCOUNTER — APPOINTMENT (OUTPATIENT)
Dept: UROLOGY | Facility: CLINIC | Age: 73
End: 2023-10-24
Payer: MEDICARE

## 2023-10-25 ENCOUNTER — OFFICE VISIT (OUTPATIENT)
Dept: UROLOGY | Facility: CLINIC | Age: 73
End: 2023-10-25
Payer: MEDICARE

## 2023-10-25 ENCOUNTER — TELEPHONE (OUTPATIENT)
Dept: PRIMARY CARE | Facility: CLINIC | Age: 73
End: 2023-10-25

## 2023-10-25 VITALS — TEMPERATURE: 97.5 F

## 2023-10-25 DIAGNOSIS — C67.9 MALIGNANT NEOPLASM OF URINARY BLADDER, UNSPECIFIED SITE (MULTI): Primary | ICD-10-CM

## 2023-10-25 LAB
POC APPEARANCE, URINE: CLEAR
POC BILIRUBIN, URINE: NEGATIVE
POC BLOOD, URINE: ABNORMAL
POC COLOR, URINE: YELLOW
POC GLUCOSE, URINE: ABNORMAL MG/DL
POC KETONES, URINE: NEGATIVE MG/DL
POC LEUKOCYTES, URINE: NEGATIVE
POC NITRITE,URINE: NEGATIVE
POC PH, URINE: 5.5 PH
POC PROTEIN, URINE: NEGATIVE MG/DL
POC SPECIFIC GRAVITY, URINE: 1.02
POC UROBILINOGEN, URINE: 0.2 EU/DL

## 2023-10-25 PROCEDURE — 1160F RVW MEDS BY RX/DR IN RCRD: CPT | Performed by: STUDENT IN AN ORGANIZED HEALTH CARE EDUCATION/TRAINING PROGRAM

## 2023-10-25 PROCEDURE — 1036F TOBACCO NON-USER: CPT | Performed by: STUDENT IN AN ORGANIZED HEALTH CARE EDUCATION/TRAINING PROGRAM

## 2023-10-25 PROCEDURE — 3044F HG A1C LEVEL LT 7.0%: CPT | Performed by: STUDENT IN AN ORGANIZED HEALTH CARE EDUCATION/TRAINING PROGRAM

## 2023-10-25 PROCEDURE — 1159F MED LIST DOCD IN RCRD: CPT | Performed by: STUDENT IN AN ORGANIZED HEALTH CARE EDUCATION/TRAINING PROGRAM

## 2023-10-25 PROCEDURE — 1125F AMNT PAIN NOTED PAIN PRSNT: CPT | Performed by: STUDENT IN AN ORGANIZED HEALTH CARE EDUCATION/TRAINING PROGRAM

## 2023-10-25 PROCEDURE — 3008F BODY MASS INDEX DOCD: CPT | Performed by: STUDENT IN AN ORGANIZED HEALTH CARE EDUCATION/TRAINING PROGRAM

## 2023-10-25 PROCEDURE — 51720 TREATMENT OF BLADDER LESION: CPT | Performed by: STUDENT IN AN ORGANIZED HEALTH CARE EDUCATION/TRAINING PROGRAM

## 2023-10-25 NOTE — PROGRESS NOTES
Patient here today for BCG Instillation, Bacillus Calmette Mahendra. Patient has been consented for instillation. BCG Instillation through a 14 Fr catheter, PVR <50ml of clear yellow urine. Patient counseled to hold medication in bladder for two hours with activity and verbalized understand of disposal of urine protocol six hours post void. Patient was told to call the office should any concerns/complications occur after instillation.      LOT: [id number]  EXP: 05/18/2024

## 2023-10-25 NOTE — TELEPHONE ENCOUNTER
Batsheva from [facility] called requesting a Bone Density order for patient, she already has an order for Bone Density scan in her chart placed by  on 10/18/2023.

## 2023-10-31 ENCOUNTER — OFFICE VISIT (OUTPATIENT)
Dept: UROLOGY | Facility: CLINIC | Age: 73
End: 2023-10-31
Payer: MEDICARE

## 2023-10-31 ENCOUNTER — HOSPITAL ENCOUNTER (OUTPATIENT)
Dept: RADIOLOGY | Facility: HOSPITAL | Age: 73
Discharge: HOME | End: 2023-10-31
Payer: MEDICARE

## 2023-10-31 VITALS — TEMPERATURE: 97.3 F

## 2023-10-31 DIAGNOSIS — M62.838 SPASM OF SKELETAL MUSCLE OF THORAX: Primary | ICD-10-CM

## 2023-10-31 DIAGNOSIS — C67.9 MALIGNANT NEOPLASM OF URINARY BLADDER, UNSPECIFIED SITE (MULTI): ICD-10-CM

## 2023-10-31 DIAGNOSIS — M62.838 SPASM OF SKELETAL MUSCLE OF THORAX: ICD-10-CM

## 2023-10-31 LAB
POC APPEARANCE, URINE: CLEAR
POC BILIRUBIN, URINE: NEGATIVE
POC BLOOD, URINE: ABNORMAL
POC COLOR, URINE: YELLOW
POC GLUCOSE, URINE: NEGATIVE MG/DL
POC KETONES, URINE: NEGATIVE MG/DL
POC LEUKOCYTES, URINE: ABNORMAL
POC NITRITE,URINE: NEGATIVE
POC PH, URINE: 5.5 PH
POC PROTEIN, URINE: NEGATIVE MG/DL
POC SPECIFIC GRAVITY, URINE: 1.02
POC UROBILINOGEN, URINE: 0.2 EU/DL

## 2023-10-31 PROCEDURE — 3075F SYST BP GE 130 - 139MM HG: CPT | Performed by: STUDENT IN AN ORGANIZED HEALTH CARE EDUCATION/TRAINING PROGRAM

## 2023-10-31 PROCEDURE — 3079F DIAST BP 80-89 MM HG: CPT | Performed by: STUDENT IN AN ORGANIZED HEALTH CARE EDUCATION/TRAINING PROGRAM

## 2023-10-31 PROCEDURE — 1125F AMNT PAIN NOTED PAIN PRSNT: CPT | Performed by: STUDENT IN AN ORGANIZED HEALTH CARE EDUCATION/TRAINING PROGRAM

## 2023-10-31 PROCEDURE — 3044F HG A1C LEVEL LT 7.0%: CPT | Performed by: STUDENT IN AN ORGANIZED HEALTH CARE EDUCATION/TRAINING PROGRAM

## 2023-10-31 PROCEDURE — 71046 X-RAY EXAM CHEST 2 VIEWS: CPT | Performed by: STUDENT IN AN ORGANIZED HEALTH CARE EDUCATION/TRAINING PROGRAM

## 2023-10-31 PROCEDURE — 1159F MED LIST DOCD IN RCRD: CPT | Performed by: STUDENT IN AN ORGANIZED HEALTH CARE EDUCATION/TRAINING PROGRAM

## 2023-10-31 PROCEDURE — 51720 TREATMENT OF BLADDER LESION: CPT | Performed by: STUDENT IN AN ORGANIZED HEALTH CARE EDUCATION/TRAINING PROGRAM

## 2023-10-31 PROCEDURE — 71046 X-RAY EXAM CHEST 2 VIEWS: CPT | Mod: FY

## 2023-10-31 PROCEDURE — 1036F TOBACCO NON-USER: CPT | Performed by: STUDENT IN AN ORGANIZED HEALTH CARE EDUCATION/TRAINING PROGRAM

## 2023-10-31 PROCEDURE — 1160F RVW MEDS BY RX/DR IN RCRD: CPT | Performed by: STUDENT IN AN ORGANIZED HEALTH CARE EDUCATION/TRAINING PROGRAM

## 2023-10-31 PROCEDURE — 3008F BODY MASS INDEX DOCD: CPT | Performed by: STUDENT IN AN ORGANIZED HEALTH CARE EDUCATION/TRAINING PROGRAM

## 2023-10-31 NOTE — PROGRESS NOTES
73 year old female here for BCG treatment 6/6. Consent on file. Patient denies any gross hematuria, dysuria or fever. Patient was experiencing some face and chest swelling. After discussing with Dr. Phipps we decided to obtain a chest x-ray prior to administration. Chest X-ray was performed and reviewed by Dr. Phipps. Dr. Phipps gave the OK to proceed. 14 Hungarian catheter was inserted vida  sterile manner and drained clear yellow urine. BCG was then administered intravesically. Busch was removed and patient was given at home instructions post treatment. Patient tolerated procedure well with no complications. Will follow up with Dr. Phipps as advised. Patient was also advised to follow up with her PCP in regards to chest and face spasms she is experiencing.

## 2023-11-16 ENCOUNTER — OFFICE VISIT (OUTPATIENT)
Dept: PRIMARY CARE | Facility: CLINIC | Age: 73
End: 2023-11-16
Payer: MEDICARE

## 2023-11-16 VITALS
RESPIRATION RATE: 18 BRPM | WEIGHT: 200 LBS | SYSTOLIC BLOOD PRESSURE: 116 MMHG | DIASTOLIC BLOOD PRESSURE: 74 MMHG | HEIGHT: 64 IN | HEART RATE: 96 BPM | OXYGEN SATURATION: 98 % | BODY MASS INDEX: 34.15 KG/M2

## 2023-11-16 DIAGNOSIS — E03.9 ACQUIRED HYPOTHYROIDISM: ICD-10-CM

## 2023-11-16 DIAGNOSIS — F32.0 DEPRESSION, MAJOR, SINGLE EPISODE, MILD (CMS-HCC): Primary | ICD-10-CM

## 2023-11-16 DIAGNOSIS — E55.9 VITAMIN D DEFICIENCY: ICD-10-CM

## 2023-11-16 PROCEDURE — 99214 OFFICE O/P EST MOD 30 MIN: CPT | Performed by: NURSE PRACTITIONER

## 2023-11-16 PROCEDURE — 3008F BODY MASS INDEX DOCD: CPT | Performed by: NURSE PRACTITIONER

## 2023-11-16 PROCEDURE — 1159F MED LIST DOCD IN RCRD: CPT | Performed by: NURSE PRACTITIONER

## 2023-11-16 PROCEDURE — 3078F DIAST BP <80 MM HG: CPT | Performed by: NURSE PRACTITIONER

## 2023-11-16 PROCEDURE — 1160F RVW MEDS BY RX/DR IN RCRD: CPT | Performed by: NURSE PRACTITIONER

## 2023-11-16 PROCEDURE — 3044F HG A1C LEVEL LT 7.0%: CPT | Performed by: NURSE PRACTITIONER

## 2023-11-16 PROCEDURE — 1036F TOBACCO NON-USER: CPT | Performed by: NURSE PRACTITIONER

## 2023-11-16 PROCEDURE — 3074F SYST BP LT 130 MM HG: CPT | Performed by: NURSE PRACTITIONER

## 2023-11-16 PROCEDURE — 1125F AMNT PAIN NOTED PAIN PRSNT: CPT | Performed by: NURSE PRACTITIONER

## 2023-11-16 RX ORDER — DOXYCYCLINE 50 MG/1
50 CAPSULE ORAL DAILY
COMMUNITY
Start: 2023-11-02

## 2023-11-16 RX ORDER — MINOXIDIL 2.5 MG/1
1.25 TABLET ORAL 2 TIMES DAILY
COMMUNITY
Start: 2023-11-02

## 2023-11-16 RX ORDER — SERTRALINE HYDROCHLORIDE 50 MG/1
50 TABLET, FILM COATED ORAL DAILY
Qty: 30 TABLET | Refills: 5 | Status: SHIPPED | OUTPATIENT
Start: 2023-11-16 | End: 2024-02-27 | Stop reason: ALTCHOICE

## 2023-11-16 ASSESSMENT — PATIENT HEALTH QUESTIONNAIRE - PHQ9
3. TROUBLE FALLING OR STAYING ASLEEP OR SLEEPING TOO MUCH: MORE THAN HALF THE DAYS
8. MOVING OR SPEAKING SO SLOWLY THAT OTHER PEOPLE COULD HAVE NOTICED. OR THE OPPOSITE, BEING SO FIGETY OR RESTLESS THAT YOU HAVE BEEN MOVING AROUND A LOT MORE THAN USUAL: NOT AT ALL
5. POOR APPETITE OR OVEREATING: MORE THAN HALF THE DAYS
SUM OF ALL RESPONSES TO PHQ QUESTIONS 1-9: 18
7. TROUBLE CONCENTRATING ON THINGS, SUCH AS READING THE NEWSPAPER OR WATCHING TELEVISION: NEARLY EVERY DAY
SUM OF ALL RESPONSES TO PHQ9 QUESTIONS 1 AND 2: 6
9. THOUGHTS THAT YOU WOULD BE BETTER OFF DEAD, OR OF HURTING YOURSELF: NOT AT ALL
6. FEELING BAD ABOUT YOURSELF - OR THAT YOU ARE A FAILURE OR HAVE LET YOURSELF OR YOUR FAMILY DOWN: MORE THAN HALF THE DAYS
10. IF YOU CHECKED OFF ANY PROBLEMS, HOW DIFFICULT HAVE THESE PROBLEMS MADE IT FOR YOU TO DO YOUR WORK, TAKE CARE OF THINGS AT HOME, OR GET ALONG WITH OTHER PEOPLE: SOMEWHAT DIFFICULT
2. FEELING DOWN, DEPRESSED OR HOPELESS: NEARLY EVERY DAY
1. LITTLE INTEREST OR PLEASURE IN DOING THINGS: NEARLY EVERY DAY
4. FEELING TIRED OR HAVING LITTLE ENERGY: NEARLY EVERY DAY

## 2023-11-16 ASSESSMENT — COLUMBIA-SUICIDE SEVERITY RATING SCALE - C-SSRS
2. HAVE YOU ACTUALLY HAD ANY THOUGHTS OF KILLING YOURSELF?: NO
1. IN THE PAST MONTH, HAVE YOU WISHED YOU WERE DEAD OR WISHED YOU COULD GO TO SLEEP AND NOT WAKE UP?: NO
6. HAVE YOU EVER DONE ANYTHING, STARTED TO DO ANYTHING, OR PREPARED TO DO ANYTHING TO END YOUR LIFE?: NO

## 2023-11-16 NOTE — PATIENT INSTRUCTIONS
Vitamin d-3  5,000 units a day with food  Lets check your thyroid and get that updated  Increase the sertraline to 50 mg po daily

## 2023-11-16 NOTE — PROGRESS NOTES
"Subjective   Patient ID: Latosha Larson is a 73 y.o. female who presents for Follow-up (Patient in today to discuss increasing depression medication states she is having a lot of trouble getting \"motivated\" to do things. She would also like to discuss feeling of rapid heartbeats while laying down and or attempting to take naps (and occasional \"tingling\" sensation in her upper body)).    Patient here to review her ongoing chronic depression.  She states she has had depression since she was a child.  She has been on and off medications.  Right now she is on sertraline 25 mg p.o. daily.  She denies thoughts of self-harm or harm to others however she admits to poor motivation and all she wants to do is lay in bed.  It takes a lot for her to get up and do something for herself.  She has family 1 child lives close [place] state but she states she does not like to bother them they have their own life.  She does volunteer and likes doing that and never skips it.  She cannot tried other things that she likes to do  History of vitamin D deficiency and hypothyroidism.  Has not had her labs updated         Review of Systems   All other systems reviewed and are negative.      Objective   /74   Pulse 96   Resp 18   Ht 1.625 m (5' 3.98\")   Wt 90.7 kg (200 lb)   SpO2 98%   BMI 34.35 kg/m²     Physical Exam  Vitals reviewed.   Constitutional:       General: She is not in acute distress.     Appearance: Normal appearance.   HENT:      Head: Normocephalic and atraumatic.   Eyes:      Extraocular Movements: Extraocular movements intact.      Conjunctiva/sclera: Conjunctivae normal.      Pupils: Pupils are equal, round, and reactive to light.   Neck:      Vascular: No carotid bruit.   Cardiovascular:      Rate and Rhythm: Normal rate and regular rhythm.      Pulses: Normal pulses.      Heart sounds: No murmur heard.  Pulmonary:      Effort: Pulmonary effort is normal.      Breath sounds: Normal breath sounds.   Abdominal: "      General: Bowel sounds are normal. There is no distension.      Palpations: Abdomen is soft.      Tenderness: There is no abdominal tenderness.   Musculoskeletal:         General: Normal range of motion.      Cervical back: Normal range of motion and neck supple.   Skin:     General: Skin is warm and dry.   Neurological:      General: No focal deficit present.      Mental Status: She is alert and oriented to person, place, and time.   Psychiatric:         Mood and Affect: Mood normal.         Behavior: Behavior normal.         Assessment/Plan   Diagnoses and all orders for this visit:  Depression, major, single episode, mild (CMS/HCC)  Comments:  check thyroid. make sure vitamin d treated. Increase the Sertraline ot 50 mg po qd from 25mg  Orders:  -     sertraline (Zoloft) 50 mg tablet; Take 1 tablet (50 mg) by mouth once daily.  -     Thyroid Stimulating Hormone; Future  -     Thyroxine, Free; Future  -     Triiodothyronine, Free; Future  Acquired hypothyroidism  Comments:  update labs TSH, FT4, FT3- may benefit from cytomel  Orders:  -     Thyroid Stimulating Hormone; Future  -     Thyroxine, Free; Future  -     Triiodothyronine, Free; Future  Vitamin D deficiency  Comments:  update level and treat accordingly.

## 2023-11-20 PROBLEM — S93.06XA CLOSED DISLOCATION OF ANKLE: Status: RESOLVED | Noted: 2023-10-08 | Resolved: 2023-11-20

## 2023-11-20 PROBLEM — E87.20 LACTIC ACIDOSIS: Status: RESOLVED | Noted: 2023-10-08 | Resolved: 2023-11-20

## 2023-11-20 PROBLEM — S82.853A CLOSED TRIMALLEOLAR FRACTURE: Status: RESOLVED | Noted: 2023-10-08 | Resolved: 2023-11-20

## 2023-11-20 PROBLEM — R73.09 BLOOD GLUCOSE ABNORMAL: Status: RESOLVED | Noted: 2023-10-08 | Resolved: 2023-11-20
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
## 2023-11-20 PROBLEM — R60.0 LOWER LEG EDEMA: Status: RESOLVED | Noted: 2023-10-08 | Resolved: 2023-11-20

## 2023-11-20 PROBLEM — N20.0 KIDNEY STONE: Status: RESOLVED | Noted: 2023-10-08 | Resolved: 2023-11-20

## 2023-11-20 PROBLEM — M70.51 BURSITIS OF RIGHT KNEE: Status: RESOLVED | Noted: 2023-10-08 | Resolved: 2023-11-20

## 2023-11-20 PROBLEM — A41.9 SEPSIS (MULTI): Status: RESOLVED | Noted: 2023-10-08 | Resolved: 2023-11-20

## 2023-11-20 PROBLEM — E83.52 HYPERCALCEMIA: Status: RESOLVED | Noted: 2023-10-08 | Resolved: 2023-11-20

## 2023-11-30 ENCOUNTER — LAB (OUTPATIENT)
Dept: LAB | Facility: LAB | Age: 73
End: 2023-11-30
Payer: MEDICARE

## 2023-11-30 DIAGNOSIS — E03.9 ACQUIRED HYPOTHYROIDISM: ICD-10-CM

## 2023-11-30 DIAGNOSIS — F32.0 DEPRESSION, MAJOR, SINGLE EPISODE, MILD (CMS-HCC): ICD-10-CM

## 2023-11-30 LAB
T3FREE SERPL-MCNC: 2.4 PG/ML (ref 2.3–4.2)
T4 FREE SERPL-MCNC: 1.2 NG/DL (ref 0.78–1.48)
TSH SERPL-ACNC: 5.08 MIU/L (ref 0.44–3.98)

## 2023-11-30 PROCEDURE — 84439 ASSAY OF FREE THYROXINE: CPT

## 2023-11-30 PROCEDURE — 84481 FREE ASSAY (FT-3): CPT

## 2023-11-30 PROCEDURE — 84443 ASSAY THYROID STIM HORMONE: CPT

## 2023-11-30 PROCEDURE — 36415 COLL VENOUS BLD VENIPUNCTURE: CPT

## 2023-12-07 ENCOUNTER — HOSPITAL ENCOUNTER (OUTPATIENT)
Dept: RADIOLOGY | Facility: HOSPITAL | Age: 73
Discharge: HOME | End: 2023-12-07
Payer: MEDICARE

## 2023-12-07 DIAGNOSIS — Z78.0 ASYMPTOMATIC MENOPAUSAL STATE: ICD-10-CM

## 2023-12-07 DIAGNOSIS — Z13.820 ENCOUNTER FOR SCREENING FOR OSTEOPOROSIS: ICD-10-CM

## 2023-12-07 PROCEDURE — 77085 DXA BONE DENSITY AXL VRT FX: CPT

## 2023-12-12 ENCOUNTER — APPOINTMENT (OUTPATIENT)
Dept: PRIMARY CARE | Facility: CLINIC | Age: 73
End: 2023-12-12
Payer: MEDICARE

## 2023-12-13 ENCOUNTER — APPOINTMENT (OUTPATIENT)
Dept: UROLOGY | Facility: CLINIC | Age: 73
End: 2023-12-13
Payer: MEDICARE

## 2023-12-27 ENCOUNTER — APPOINTMENT (OUTPATIENT)
Dept: UROLOGY | Facility: CLINIC | Age: 73
End: 2023-12-27
Payer: MEDICARE

## 2024-01-09 ENCOUNTER — OFFICE VISIT (OUTPATIENT)
Dept: PRIMARY CARE | Facility: CLINIC | Age: 74
End: 2024-01-09
Payer: MEDICARE

## 2024-01-09 VITALS
RESPIRATION RATE: 18 BRPM | HEART RATE: 82 BPM | DIASTOLIC BLOOD PRESSURE: 78 MMHG | OXYGEN SATURATION: 99 % | HEIGHT: 63 IN | SYSTOLIC BLOOD PRESSURE: 126 MMHG | BODY MASS INDEX: 32.96 KG/M2 | WEIGHT: 186 LBS

## 2024-01-09 DIAGNOSIS — F32.0 DEPRESSION, MAJOR, SINGLE EPISODE, MILD (CMS-HCC): ICD-10-CM

## 2024-01-09 DIAGNOSIS — E03.9 ACQUIRED HYPOTHYROIDISM: Primary | ICD-10-CM

## 2024-01-09 PROCEDURE — 3078F DIAST BP <80 MM HG: CPT | Performed by: NURSE PRACTITIONER

## 2024-01-09 PROCEDURE — 1160F RVW MEDS BY RX/DR IN RCRD: CPT | Performed by: NURSE PRACTITIONER

## 2024-01-09 PROCEDURE — 3008F BODY MASS INDEX DOCD: CPT | Performed by: NURSE PRACTITIONER

## 2024-01-09 PROCEDURE — 3074F SYST BP LT 130 MM HG: CPT | Performed by: NURSE PRACTITIONER

## 2024-01-09 PROCEDURE — 99214 OFFICE O/P EST MOD 30 MIN: CPT | Performed by: NURSE PRACTITIONER

## 2024-01-09 PROCEDURE — 1036F TOBACCO NON-USER: CPT | Performed by: NURSE PRACTITIONER

## 2024-01-09 PROCEDURE — 1125F AMNT PAIN NOTED PAIN PRSNT: CPT | Performed by: NURSE PRACTITIONER

## 2024-01-09 RX ORDER — METRONIDAZOLE 10 MG/G
GEL TOPICAL
COMMUNITY
Start: 2024-01-03 | End: 2024-05-16 | Stop reason: ALTCHOICE

## 2024-01-09 RX ORDER — LIOTHYRONINE SODIUM 5 UG/1
5 TABLET ORAL DAILY
Qty: 30 TABLET | Refills: 5 | Status: SHIPPED | OUTPATIENT
Start: 2024-01-09 | End: 2024-01-11 | Stop reason: SDUPTHER

## 2024-01-09 RX ORDER — BETAMETHASONE DIPROPIONATE 0.5 MG/G
LOTION TOPICAL DAILY
COMMUNITY
Start: 2024-01-04

## 2024-01-09 NOTE — PATIENT INSTRUCTIONS
Take the sertraline 1/2 tab daily x 1 week, then every other day x 1 week then stop.   You are hypothyroid. Lets start cytomel 5 mcg daily in the am. This may help with your mood.   Start Vitamin D 5,000 international units po every day with food  Ca+ 600-1200 mg po daily.

## 2024-01-09 NOTE — PROGRESS NOTES
"Subjective   Patient ID: Latosha Larson is a 73 y.o. female who presents for Follow-up (Latosha is in today for one month F/U, states that she has \"all kind of issues\", reports missing Thanksgiving and [place] with her family due to URI. Still c/o increased fatigue and would like to discuss getting off the Zoloft completely, reports she did not notice a difference with the increase. Had labs and bone density completed. ).    73-year-old female here to follow-up on depression with recent adjustment in sertraline.  However she wanted to let me know because she could not remember if she told me during her last visit that she often will wake up in the middle of the night on her back, dry mouth. If wake up on her side there is drool coming out of her mouth. Will feel her heart speed up when laying down. When settling in at night will feel like her heart is going fast \"once in a while it feels like its Jumping\". 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 Saw cardio in the past had a monitor which did not pick anything up.   Was seen in Nov for depression. Sertraline was increased to 50 mg po daily. States it wasn't working so she stopped taking it. Has been on anti-depressants in the past and after 30 days would benefit. This time not working so stopped.   Labs reviewed-TSH looks okay free T4 is good on levothyroxine.  Her free T3 is low normal.  I discussed starting Cytomel with her to see if that helps with her chronic depression.  She is agreeable to try I reviewed the medication including risk versus benefit and possible side effects and continued monitoring.  States the last time felt good was 8 years ago. Otherwise its ok or lousy.   DEXA ordered by her insurance- [employer]. I reviewed with her- osteopenia. Not on Ca or Vitamin D         Review of Systems   All other systems reviewed and are negative.      Objective   /78   Pulse 82   Resp 18   Ht 1.6 m (5' 3\")   Wt 84.4 kg (186 lb)   SpO2 99%   BMI " 32.95 kg/m²     Physical Exam  Constitutional:       Appearance: Normal appearance.   Cardiovascular:      Rate and Rhythm: Normal rate and regular rhythm.   Pulmonary:      Effort: Pulmonary effort is normal.      Breath sounds: Normal breath sounds.   Neurological:      Mental Status: She is alert and oriented to person, place, and time.   Psychiatric:         Mood and Affect: Mood normal.         Assessment/Plan   Diagnoses and all orders for this visit:  Acquired hypothyroidism  Comments:  Continue T4.  Add Cytomel 5 mcg p.o. daily in the morning repeat blood work in approximately 8 weeks order placed in chart  Orders:  -     liothyronine (Cytomel) 5 mcg tablet; Take 1 tablet (5 mcg) by mouth once daily.  -     Thyroid Stimulating Hormone; Future  -     Thyroxine, Free; Future  -     Triiodothyronine, Free; Future  Depression, major, single episode, mild (CMS/HCC)  Comments:  Longstanding and chronic she does not and will not see psychiatry.  She does not want medications stating they do not work.  Other orders  -     Follow Up In Primary Care - Established; Future    Cytomel has been shown to be very effective in some people with mental health disorders including depression.  I would give this a fair shot to see if it helps her mentation.  Continue to monitor her labs closely.  Patient is agreeable with this plan

## 2024-01-11 ENCOUNTER — TELEPHONE (OUTPATIENT)
Dept: PRIMARY CARE | Facility: CLINIC | Age: 74
End: 2024-01-11
Payer: MEDICARE

## 2024-01-11 DIAGNOSIS — E03.9 ACQUIRED HYPOTHYROIDISM: ICD-10-CM

## 2024-01-11 RX ORDER — LIOTHYRONINE SODIUM 5 UG/1
5 TABLET ORAL DAILY
Qty: 30 TABLET | Refills: 5 | Status: SHIPPED | OUTPATIENT
Start: 2024-01-11

## 2024-02-05 ENCOUNTER — PROCEDURE VISIT (OUTPATIENT)
Dept: UROLOGY | Facility: CLINIC | Age: 74
End: 2024-02-05
Payer: MEDICARE

## 2024-02-05 ENCOUNTER — APPOINTMENT (OUTPATIENT)
Dept: UROLOGY | Facility: CLINIC | Age: 74
End: 2024-02-05
Payer: MEDICARE

## 2024-02-05 DIAGNOSIS — R39.9 LOWER URINARY TRACT SYMPTOMS (LUTS): ICD-10-CM

## 2024-02-05 DIAGNOSIS — C67.9 MALIGNANT NEOPLASM OF URINARY BLADDER, UNSPECIFIED SITE (MULTI): Primary | ICD-10-CM

## 2024-02-05 LAB
POC APPEARANCE, URINE: CLEAR
POC BILIRUBIN, URINE: NEGATIVE
POC BLOOD, URINE: NEGATIVE
POC COLOR, URINE: YELLOW
POC GLUCOSE, URINE: ABNORMAL MG/DL
POC KETONES, URINE: NEGATIVE MG/DL
POC LEUKOCYTES, URINE: NEGATIVE
POC NITRITE,URINE: NEGATIVE
POC PH, URINE: 5.5 PH
POC PROTEIN, URINE: NEGATIVE MG/DL
POC SPECIFIC GRAVITY, URINE: 1.02
POC UROBILINOGEN, URINE: 0.2 EU/DL

## 2024-02-05 PROCEDURE — 99214 OFFICE O/P EST MOD 30 MIN: CPT | Performed by: STUDENT IN AN ORGANIZED HEALTH CARE EDUCATION/TRAINING PROGRAM

## 2024-02-05 PROCEDURE — 52000 CYSTOURETHROSCOPY: CPT | Performed by: STUDENT IN AN ORGANIZED HEALTH CARE EDUCATION/TRAINING PROGRAM

## 2024-02-05 NOTE — PROGRESS NOTES
Patient ID: Latosha Larson is a 74 y.o. female.    Procedures  PROCEDURE NOTE:    PREOPERATIVE DIAGNOSIS:  Bladder cancer    POSTOPERATIVE DIAGNOSIS:  Recurrent bladder cancer    OPERATION:  Flexible Cystourethroscopy      SURGEON:  Stefany Phipps MD    ANESTHESIA:  2%  lidocaine jelly    COMPLICATIONS:  None    EBL: Minimal    SPECIMEN:  Voided urine was not collected and submitted for cytology.    DISPOSITION:  The patient was discharged home after the procedure, per routine.    INDICATIONS: :  Ms. Larson is a 74 y.o. patient with a history of LG bladder cancer, recurrent, s/p intravesical BCG who presents today for Cystoscopy.     The indications, risks and benefits of this procedure were discussed with the patient, consent was obtained prior to the procedure, and to the best of my judgement the patient seemed to understand and agree to the procedure.    PROCEDURE:  The patient  was brought into the procedure suite and informed consent was reviewed and confirmed. Vital signs were obtained prior to the procedure: There were no vitals taken for this visit..  The patient was escorted onto the stretcher, placed supine, prepped with betadine and draped in the usual standard surgical fashion.  Intraurethral 2% viscous lidocaine jelly was used for local analgesia.  A 16 Tajik flexible cystourethroscope was inserted into the urethra.   The urethra was normal.  Upon entering the bladder the entire bladder was surveyed in a 360 degree fashion.  The left and right ureteral orifices were in normal orthotopic position effluxing clear yellow urine, bilaterally.   There was 2 small areas of flat urothelial abnormality, likely a tumor recurrence. The cystoscope was then retroflexed.  The bladder neck was then further examined without any evidence of lesions. The scope was then removed and in an antegrade fashion, the urethra and bladder were again resurveyed with no evidence of additional lesions.  The cystoscope was then  fully removed.   The patient tolerated the procedure well.  Vitals were stable after the procedure.  The patient was able to void and was discharged home.  Verbal and written Post procedure instructions were reviewed with the patient.    IMPRESSION:  Tumor recurrence    PLAN:  TURBT      Subjective   Patient ID: Latosha Larson is a 74 y.o. female.    HPI  74 year old female s/p Blue light cystoscopy, TURBT 8/1/23. She is feeling well postoperatively. No fever, chills.      Surgical pathology showed non-invasive low-grade urothelial carcinoma with early papillary formation and non-invasive low grade papillary urothelial carcinoma.     She is s/p [facility].     Cystoscopy today.     Review of Systems    Objective   Physical Exam  Vitals reviewed.       Assessment/Plan   74 year old female s/p Blue light cystoscopy, TURBT 8/1/23. She is feeling well postoperatively. No fever, chills.      Surgical pathology showed non-invasive low-grade urothelial carcinoma with early papillary formation and non-invasive low grade papillary urothelial carcinoma.     She is s/p [facility].     Cystoscopy today. Findings as above.    I explained to the patient that this is the cystoscopy is positive for tumor recurrence, then excision of the tumor is indicated.  This was performed secondary to prior resections through the transurethral approach.  I explained the risks and benefits as well as the alternatives of repeating the cystoscopy in 3 months to assess for further growth.  After resection, if cancer recurrence is confirmed, then intravesical gemcitabine-docetaxel would be a good option.  The patient understands and would like to proceed with the plan.     Plan:   Will plan for TURBT  Diagnoses and all orders for this visit:  Malignant neoplasm of urinary bladder, unspecified site (CMS/HCC)    Scribe Attestation  By signing my name below, IAbeba Scribe attest that this documentation has been prepared under the direction and in the  presence of Stefany Phipps MD.

## 2024-02-07 PROBLEM — C67.9 MALIGNANT NEOPLASM OF URINARY BLADDER (MULTI): Status: ACTIVE | Noted: 2024-02-05

## 2024-02-18 DIAGNOSIS — E11.69 TYPE 2 DIABETES MELLITUS WITH OTHER SPECIFIED COMPLICATION, WITHOUT LONG-TERM CURRENT USE OF INSULIN (MULTI): ICD-10-CM

## 2024-02-19 RX ORDER — BLOOD SUGAR DIAGNOSTIC
STRIP MISCELLANEOUS
Qty: 300 STRIP | Refills: 1 | Status: SHIPPED | OUTPATIENT
Start: 2024-02-19 | End: 2024-03-05 | Stop reason: HOSPADM

## 2024-02-27 ENCOUNTER — PRE-ADMISSION TESTING (OUTPATIENT)
Dept: PREADMISSION TESTING | Facility: HOSPITAL | Age: 74
End: 2024-02-27
Payer: MEDICARE

## 2024-02-27 VITALS
DIASTOLIC BLOOD PRESSURE: 89 MMHG | TEMPERATURE: 98.6 F | OXYGEN SATURATION: 97 % | SYSTOLIC BLOOD PRESSURE: 137 MMHG | WEIGHT: 205.69 LBS | HEART RATE: 86 BPM | HEIGHT: 64 IN | RESPIRATION RATE: 18 BRPM | BODY MASS INDEX: 35.12 KG/M2

## 2024-02-27 DIAGNOSIS — E03.9 ACQUIRED HYPOTHYROIDISM: ICD-10-CM

## 2024-02-27 DIAGNOSIS — Z01.818 PRE-OPERATIVE EXAMINATION: Primary | ICD-10-CM

## 2024-02-27 DIAGNOSIS — C67.9 MALIGNANT NEOPLASM OF URINARY BLADDER, UNSPECIFIED SITE (MULTI): ICD-10-CM

## 2024-02-27 LAB
ANION GAP SERPL CALC-SCNC: 20 MMOL/L (ref 10–20)
APPEARANCE UR: CLEAR
BILIRUB UR STRIP.AUTO-MCNC: NEGATIVE MG/DL
BUN SERPL-MCNC: 21 MG/DL (ref 6–23)
CALCIUM SERPL-MCNC: 10.6 MG/DL (ref 8.6–10.3)
CHLORIDE SERPL-SCNC: 101 MMOL/L (ref 98–107)
CO2 SERPL-SCNC: 24 MMOL/L (ref 21–32)
COLOR UR: NORMAL
CREAT SERPL-MCNC: 0.93 MG/DL (ref 0.5–1.05)
EGFRCR SERPLBLD CKD-EPI 2021: 65 ML/MIN/1.73M*2
ERYTHROCYTE [DISTWIDTH] IN BLOOD BY AUTOMATED COUNT: 14.3 % (ref 11.5–14.5)
GLUCOSE SERPL-MCNC: 138 MG/DL (ref 74–99)
GLUCOSE UR STRIP.AUTO-MCNC: NEGATIVE MG/DL
HCT VFR BLD AUTO: 40.6 % (ref 36–46)
HGB BLD-MCNC: 12.9 G/DL (ref 12–16)
KETONES UR STRIP.AUTO-MCNC: NEGATIVE MG/DL
LEUKOCYTE ESTERASE UR QL STRIP.AUTO: NEGATIVE
MCH RBC QN AUTO: 30.1 PG (ref 26–34)
MCHC RBC AUTO-ENTMCNC: 31.8 G/DL (ref 32–36)
MCV RBC AUTO: 95 FL (ref 80–100)
NITRITE UR QL STRIP.AUTO: NEGATIVE
NRBC BLD-RTO: 0 /100 WBCS (ref 0–0)
PH UR STRIP.AUTO: 6 [PH]
PLATELET # BLD AUTO: 293 X10*3/UL (ref 150–450)
POTASSIUM SERPL-SCNC: 4.8 MMOL/L (ref 3.5–5.3)
PROT UR STRIP.AUTO-MCNC: NEGATIVE MG/DL
RBC # BLD AUTO: 4.28 X10*6/UL (ref 4–5.2)
RBC # UR STRIP.AUTO: NEGATIVE /UL
SODIUM SERPL-SCNC: 140 MMOL/L (ref 136–145)
SP GR UR STRIP.AUTO: 1.01
T4 FREE SERPL-MCNC: 0.33 NG/DL (ref 0.61–1.12)
TSH SERPL-ACNC: 62 MIU/L (ref 0.44–3.98)
UROBILINOGEN UR STRIP.AUTO-MCNC: <2 MG/DL
WBC # BLD AUTO: 7.4 X10*3/UL (ref 4.4–11.3)

## 2024-02-27 PROCEDURE — 80048 BASIC METABOLIC PNL TOTAL CA: CPT

## 2024-02-27 PROCEDURE — 81003 URINALYSIS AUTO W/O SCOPE: CPT

## 2024-02-27 PROCEDURE — 85027 COMPLETE CBC AUTOMATED: CPT

## 2024-02-27 PROCEDURE — 84481 FREE ASSAY (FT-3): CPT | Mod: GEALAB

## 2024-02-27 PROCEDURE — 84439 ASSAY OF FREE THYROXINE: CPT

## 2024-02-27 PROCEDURE — 84443 ASSAY THYROID STIM HORMONE: CPT

## 2024-02-27 PROCEDURE — 36415 COLL VENOUS BLD VENIPUNCTURE: CPT

## 2024-02-27 PROCEDURE — 99213 OFFICE O/P EST LOW 20 MIN: CPT | Performed by: REGISTERED NURSE

## 2024-02-27 RX ORDER — CHOLECALCIFEROL (VITAMIN D3) 125 MCG
125 CAPSULE ORAL DAILY
COMMUNITY

## 2024-02-27 RX ORDER — PNV NO.95/FERROUS FUM/FOLIC AC 28MG-0.8MG
100 TABLET ORAL DAILY
COMMUNITY

## 2024-02-27 RX ORDER — METFORMIN HYDROCHLORIDE 500 MG/1
1000 TABLET ORAL
COMMUNITY

## 2024-02-27 ASSESSMENT — CHADS2 SCORE
CHF: NO
HYPERTENSION: NO
PRIOR STROKE OR TIA OR THROMBOEMBOLISM: NO
CHADS2 SCORE: 1
AGE GREATER THAN OR EQUAL TO 75: NO
DIABETES: YES

## 2024-02-27 ASSESSMENT — PAIN SCALES - GENERAL: PAINLEVEL_OUTOF10: 0 - NO PAIN

## 2024-02-27 ASSESSMENT — PAIN - FUNCTIONAL ASSESSMENT: PAIN_FUNCTIONAL_ASSESSMENT: 0-10

## 2024-02-27 ASSESSMENT — LIFESTYLE VARIABLES: SMOKING_STATUS: NONSMOKER

## 2024-02-27 NOTE — PREPROCEDURE INSTRUCTIONS
Medication List            Accurate as of February 27, 2024  1:58 PM. Always use your most recent med list.                betamethasone dipropionate 0.05 % lotion  Commonly known as: Diprosone  Medication Adjustments for Surgery: Continue until night before surgery     chlorhexidine 0.12 % solution  Commonly known as: Peridex  Medication Adjustments for Surgery: Take morning of surgery with sip of water, no other fluids     cholecalciferol 125 MCG (5000 UT) capsule  Commonly known as: Vitamin D-3  Medication Adjustments for Surgery: Stop 7 days before surgery     cyanocobalamin 100 mcg tablet  Commonly known as: Vitamin B-12  Medication Adjustments for Surgery: Stop 7 days before surgery     doxycycline 50 mg capsule  Commonly known as: Monodox  Medication Adjustments for Surgery: Take morning of surgery with sip of water, no other fluids     Eliquis 5 mg tablet  Generic drug: apixaban  Medication Adjustments for Surgery: Other (Comment)  Notes to patient: Hold 48 hours preop per Dr. Frost     Fish OiL 1,200 (144-216) mg capsule  Generic drug: omega 3-dha-epa-fish oil  Medication Adjustments for Surgery: Stop 7 days before surgery     glimepiride 2 mg tablet  Commonly known as: Amaryl  TAKE ONE TABLET BY MOUTH EVERY DAY (NEED TO MAKE A ROUTINE APPOINTMENT FOR MORE REFILLS)  Medication Adjustments for Surgery: Other (Comment)  Notes to patient: Do not take the evening before or the day of surgery.     ibuprofen capsule  Commonly known as: Motrin  Medication Adjustments for Surgery: Stop 7 days before surgery     lidocaine 5 % cream cream  Commonly known as: Xylocaine  Medication Adjustments for Surgery: Continue until night before surgery     liothyronine 5 mcg tablet  Commonly known as: Cytomel  Take 1 tablet (5 mcg) by mouth once daily.  Medication Adjustments for Surgery: Take morning of surgery with sip of water, no other fluids     metFORMIN 500 mg tablet  Commonly known as: Glucophage  Medication  Adjustments for Surgery: Other (Comment)  Notes to patient: Do not take the evening before or the day of surgery.     metroNIDAZOLE 1 % gel  Commonly known as: Metrogel  Medication Adjustments for Surgery: Continue until night before surgery     minoxidil 2.5 mg tablet  Commonly known as: Loniten  Medication Adjustments for Surgery: Take morning of surgery with sip of water, no other fluids     OneTouch Ultra Test strip  Generic drug: blood sugar diagnostic  USE TO TEST BLOOD GLUCOSE THREE TIMES DAILY  Medication Adjustments for Surgery: Other (Comment)     pantoprazole 40 mg EC tablet  Commonly known as: ProtoNix  Medication Adjustments for Surgery: Take morning of surgery with sip of water, no other fluids              SURGERY PRE-OPERATIVE INSTRUCTIONS    *You will receive a phone call the day before your procedure  after 2pm, (or the Friday before your surgery if scheduled on a Monday.) Generally the hospital will be calling you with this information after that time.    *You are not to eat after midnight the night before the surgery. You may have 8oz of a clear liquid up until 2 hours prior to arriving to the hospital. The exception is with medications you were instructed to take day of surgery.    *You may take tylenol for pain/discomfort as needed.     *Stop taking all aspirin products, ibuprofen (motrin/advil), naproxen (aleve/naprosyn) for one week prior to surgery.    *Stop taking all vitamins and supplements one week prior to surgery.     *You should not have alcoholic beverages for 24 hours before surgery.     *You should not smoke 24 hours prior to surgery.     *To help prevent surgical infections bathe/shower with Dial soap the evening before surgery.    *You can wear deodorant but no lotion, powder, or perfume/cologne. You should remove all make-up and nail polish at home.    *If you wear glasses, please bring a case for the glasses with you.    *You will be asked to remove dentures and contacts.      *Please leave all valuables at home.    *You should wear loose, comfortable clothing that will accommodate bandages and/or casts.    *You should notify your doctor of any change in your condition (fever, cold, rash, etc). Surgery may need to be re-scheduled until a time you are in better health.    *A responsible adult is required to accompany you to and from the hospital if you are receiving anesthesia or a sedative. Patients are not permitted to drive for 24 hours after anesthesia.     *You can use the [employer] parking if you wish.     *If you have any further questions please call [phone].                  NPO Instructions:        Additional Instructions:

## 2024-02-27 NOTE — H&P (VIEW-ONLY)
CPM/PAT Evaluation       Name: Latosha Larson (Latosha Larson)  /Age: 1950/74 y.o.     In-Person       Chief Complaint: Evaluation prior to surgery    HPI  74 year old female scheduled for CYSTOSCOPY WITH ABLATION on 3/5/24 with Dr. Phipps secondary to Malignant neoplasm of urinary bladder. PMHx includes hypothyroid, depression, IBS, Esophageal Reflux, DM, DVT/PE May-thurner syndrome. Presents to [facility] today for pre-operative risk stratification and optimization.   Past Medical History:   Diagnosis Date    Abdominal cramping 2023    Abnormal mammogram 2023    Abscess of eyelid right eye, unspecified eyelid 2021    Eyelid cellulitis, right    Accidental fall 10/08/2023    Allergic rhinitis 2023    Ankle fracture, left 2023    Blood glucose abnormal 10/08/2023    Body mass index (BMI) 33.0-33.9, adult 2021    Body mass index (BMI) of 33.0 to 33.9 in adult    Bursitis of right knee 10/08/2023    Candidiasis, unspecified 2022    Yeast infection    Cardiomegaly     Chronic back pain     Closed dislocation of ankle 10/08/2023    Complement 4 deficiency (CMS/HCC)     denies    Compression of vein 2023    Constipation 2023    Depression     DJD (degenerative joint disease)     DM (diabetes mellitus) (CMS/HCC)     DVT (deep venous thrombosis) (CMS/[facility])       left leg    Dysphagia 2023    Encounter for other preprocedural examination 2022    Preop examination    Encounter for other preprocedural examination 2022    Preop examination    Facial twitching 2023    Headache 2023    History of blood transfusion       after surgery    HLD (hyperlipidemia)     HTN (hypertension)     denies    Hypercalcemia 10/08/2023    Hypothyroidism     IBS (irritable bowel syndrome)     Kidney stone 10/08/2023    Lactic acidosis 10/08/2023    Long term (current) use of antibiotics 2020    Prophylactic antibiotic    Lower leg edema 10/08/2023     Lumbar spondylosis     Lung nodules     multiple    Macular degeneration     Malignant neoplasm of bladder (CMS/[facility])     May-Thurner syndrome     OA (osteoarthritis)     Obesity, unspecified 2022    Class 2 obesity with body mass index (BMI) of 39.0 to 39.9 in adult    Ocular pain, right eye     Discomfort of right eye    Other amnesia 2022    Memory changes    Other forms of dyspnea 2022    AREVALO (dyspnea on exertion)    Palpitations 2023    PE (pulmonary thromboembolism) (CMS/[facility])         Peptic ulcer 07/10/2008    Personal history of other diseases of urinary system 2021    History of hematuria    Personal history of other specified conditions 2021    History of palpitations    Personal history of other specified conditions 2021    History of dysphagia    Right upper quadrant pain 2021    Abdominal pain, RUQ    Sepsis (CMS/[facility]) 10/08/2023    Vitamin D deficiency     Wears glasses     Wears hearing aid in both ears     Yeast infection 2023       Past Surgical History:   Procedure Laterality Date    BALLOON ANGIOPLASTY, ARTERY      iliac artery stent LLE    CT ABDOMEN PELVIS ANGIOGRAM W AND/OR WO IV CONTRAST  2020    CT ABDOMEN PELVIS ANGIOGRAM W AND/OR WO IV CONTRAST [facility]    CYSTOSCOPY      with TURBT  multiple    IR INTERVENTION FILTER PLACEMENT      IVC FILTER RETRIEVAL      ORIF ANKLE FRACTURE Left     OTHER SURGICAL HISTORY  2020     section    OTHER SURGICAL HISTORY  2022    Transurethral resection of bladder tumor    OTHER SURGICAL HISTORY      cone biopsy    OTHER SURGICAL HISTORY      varicose vein ligation       Patient  has no history on file for sexual activity.    No family history on file.    Allergies   Allergen Reactions    Cyclobenzaprine Other, Rash and Hives    Amoxicillin-Pot Clavulanate Other and Nausea/vomiting    Pneumococcal 23-Katelyn Ps Vaccine Other and Hives       Prior to Admission  medications    Medication Sig Start Date End Date Taking? Authorizing Provider   blood sugar diagnostic (OneTouch Ultra Test) strip USE TO TEST BLOOD GLUCOSE THREE TIMES DAILY 2/19/24   FAITH Loaiza   BCG, live, (Marsha BCG) 50 mg injection INSTILL 1 VIAL WEEKLY FOR 3 WEEKS FOR MAINTENCE 8/23/23 2/23/24  Stefany Phipps MD   betamethasone dipropionate (Diprosone) 0.05 % lotion  1/4/24   Historical Provider, MD   chlorhexidine (Peridex) 0.12 % solution RINSE MOUTH WITH 15 ML (1 CAPFUL) FOR 30 SECONDS IN THE MORNING AND EVENING AFTER TOOTHBRUSHING. EXPECTORATE AFTER RINISING DO NOT SWALLOW. 7/20/23   Historical Provider, MD   doxycycline (Monodox) 50 mg capsule Take 1 capsule (50 mg) by mouth once daily. 11/2/23   Historical Provider, MD   Eliquis 5 mg tablet Take 1 tablet (5 mg) by mouth 2 times a day. 2/22/21   Historical Provider, MD   glimepiride (Amaryl) 2 mg tablet TAKE ONE TABLET BY MOUTH EVERY DAY (NEED TO MAKE A ROUTINE APPOINTMENT FOR MORE REFILLS) 8/1/23   Lucila Brown,    ibuprofen (Motrin) capsule Take 2 capsules (400 mg) by mouth if needed (RARELY).    Historical Provider, MD   levothyroxine (Synthroid, Levoxyl) 125 mcg tablet Mon-Sat no meds on Sun 7/17/23   ERICA Maurer-CNP   lidocaine (Xylocaine) 5 % cream cream Use as directed    Historical Provider, MD   liothyronine (Cytomel) 5 mcg tablet Take 1 tablet (5 mcg) by mouth once daily. 1/11/24   FAITH Loaiza   metroNIDAZOLE (Metrogel) 1 % gel  1/3/24   Historical Provider, MD   minoxidil (Loniten) 2.5 mg tablet Take 1 tablet (2.5 mg) by mouth 2 times a day. 11/2/23   Historical Provider, MD   omega 3-dha-epa-fish oil (Fish OiL) 1,200 (144-216) mg capsule Take 1 capsule (1,200 mg) by mouth once daily.    Historical Provider, MD   pantoprazole (ProtoNix) 40 mg EC tablet Take 1 tablet (40 mg) by mouth once daily in the morning. Take before meals. 10/19/21   Historical Provider, MD   sertraline (Zoloft) 50 mg  tablet Take 1 tablet (50 mg) by mouth once daily. 11/16/23   Dipti BOLES Butt, APRN-CNP   triprolidine/pseudoephedrine (ANTIHISTAMINE ORAL) Take 1 tablet by mouth if needed (OTC).    Historical Provider, MD KAREN JONES Physical Exam     Airway    Visit Vitals  /89   Pulse 86   Temp 37 °C (98.6 °F)   Resp 18       DASI Risk Score    No data to display       Caprini DVT Assessment      Flowsheet Row Most Recent Value   DVT Score 8   Current Status Major surgery planned, including arthroscopic and laproscopic (1-2 hours)   History Prior major surgery   Age 60-75 years   BMI 31-40 (Obesity)          Modified Frailty Index    No data to display       CHADS2 Stroke Risk  Current as of 31 minutes ago        N/A 3 - 100%: High Risk   2 - 3%: Medium Risk   0 - 2%: Low Risk     Last Change: N/A          This score determines the patient's risk of having a stroke if the patient has atrial fibrillation.        This score is not applicable to this patient. Components are not calculated.          Revised Cardiac Risk Index      Flowsheet Row Most Recent Value   Revised Cardiac Risk Calculator 0          Apfel Simplified Score      Flowsheet Row Most Recent Value   Apfel Simplified Score Calculator 3          Risk Analysis Index Results This Encounter    No data found in the last 1 encounters.         Assessment and Plan:     Anesthesia:  The patient denies problems with anesthesia in the past such as PONV, prolonged sedation, awareness, dental damage, aspiration, cardiac arrest, difficult intubation, or unexpected hospital admissions.     Neuro:   The patient is at increased risk for perioperative stroke secondary to increased age, female gender, diabetes mellitus. History of depression that is longstanding and chronic, not on medication.    HEENT/Airway  No diagnoses, significant findings on chart review, clinical presentation, or evaluation.    Cardiovascular  11/18/20 Cath Hx DVT PE 11/2020 May-thurner syndrome On  Eliquis  3/6/22 US Lower EXT  FINDINGS:  RIGHT: There is normal compressibility of the common femoral vein,  saphenous femoral junction, profunda femoris, femoral vein and  popliteal vein. The right posterior tibial and peroneal veins  demonstrate normal color flow and compressibility. There is normal  spontaneous and phasic variation throughout the right lower extremity  by spectral doppler.     LEFT: There is normal compressibility of the common femoral vein,  saphenous femoral junction, profunda femoris, femoral vein and  popliteal vein. The left posterior tibial and peroneal veins  demonstrate normal color flow and compressibility. There is normal  spontaneous and phasic variation throughout the left lower extremity  by spectral doppler.     OTHER FINDINGS: None.     IMPRESSION:  No evidence DVT in the bilateral lower extremities    11/16/20 ECHO  FINDINGS:     Procedural Information: Exam quality: fair  LV:       The left ventricular chamber size is normal. There is normal            left  ventricular systolic function. Estimated ejection            fraction  is 55-59%. There is normal ventricular diastolic            filling  observed.  RV:       The right ventricular cavity size is normal. The right            ventricular  global systolic function is normal.  LA:       The left atrial size is normal.  RA:       Right atrial cavity size is normal.  KARLENE:     A trivial pericardial effusion is visualized.  AO:       Aortic root is normal in size.  SVn:      Inferior vena cava is normal.  AV:       Aortic valve is structurally normal. No evidence of            regurgitation.  MV:       Mitral valve is structurally normal. No evidence of            regurgitation.  PV:       Pulmonic valve is structurally normal.  TV:       Tricuspid valve is structurally normal. Trivial tricuspid            regurgitation.  Unable to estimate the right ventricular            systolic  pressure.           Exam quality: fair   The  left ventricular chamber size is normal.   There is normal left ventricular systolic function.   Estimated ejection fraction is 55-59%.   The right ventricular global systolic function is normal.   Aortic valve is structurally normal. No evidence of regurgitation.   Mitral valve is structurally normal. No evidence of regurgitation.   Trivial tricuspid regurgitation.   Unable to estimate the right ventricular systolic pressure.  RCRI  The patient meets 0-1 RCRI criteria and therefore has a less than 1% risk of major adverse cardiac complications.    EKG  The patient has no EKG or echocardiographic changes concerning for myocardial ischemia.   EKG 7/20/23  Normal sinus rhythm  Normal ECG  When compared with ECG of 06-MAR-2022 19:52,  No significant change was found  Heart Failure  The patient has no known history of heart failure.  Additionally, the patient reports no symptoms of heart failure and demonstrates no signs of heart failure.  Hypertension Evaluation  The patient has a known history of hypertension that is uncontrolled.  Patient denies history of hypertension.   Heart Rhythm Evaluation  The patient has no history of arrhythmias.  Heart Valve Evaluation  The patient has no known history of valvular heart disease. The patient has no symptoms or physical exam findings to suggest valvular heart disease.  CARDS EVAL  The patient follows with cardiology, Dr. Frost. Patient was last seen 8/11/23 note: 2/21/24. Per note, Patient is a low risk for cystoscopy with ablation. Hold Eliquis 48 hours pre-op. Resume 12-48 hours post op..  MIC score which indicates a 0.3% risk of intraoperative or 30-day postoperative.    Pulmonary   No significant findings on chart review or clinical presentation and evaluation. The patient is at increased risk of perioperative pulmonary complications secondary to advanced age greater than 60.      ARISCAT 3, low, 1.6% risk of in-hospital postoperative pulmonary complications  PRODIGY 12,  intermediate risk of respiratory depression episode.    Hematology  The patient has diagnoses or significant findings on chart review or clinical presentation and evaluation significant for May thurner syndrome.  Antiplatelet management   The patient is currently receiving antiplatelet therapy for history of DVT/PE.11/2020.On Eliquis Hold Eliquis 48 hours pre-op. Resume 12-48 hours post op..  Anticoagulation management  The patient is not currently receiving anticoagulation therapy.  Caprini score 8, high risk of perioperative VTE    Gastrointestinal  The patient has diagnoses or significant findings on chart review or clinical presentation and evaluation significant for IBS, Esophageal Reflux controlled on pantoprazole.  Eat 10- 0,  self-perceived oropharyngeal dysphagia scale (0-40)     Genitourinary  The patient has diagnoses or significant findings on chart review or clinical presentation and evaluation significant for Malignant neoplasm of urinary bladder  Most recrent TURBT 8/1/23, Cysto 2/5/24 Hx LG bladder cancer, recurrent, s/p intravesical BCG    Renal  The patient has no known history of chronic kidney disease.    Musculoskeletal  No diagnoses or significant findings on chart review or clinical presentation and evaluation.    Endocrine  Diabetes Evaluation  The patient has history of diabetes mellitus controlled by metformin  Thyroid Disease Evaluation  The patient has a history of thyroid disease and is medicated with T4 and cytomel.    ID  No diagnoses or significant findings on chart review or clinical presentation and evaluation.    -Preoperative medication instructions were provided and reviewed with the patient.  Any additional testing or evaluation was explained to the patient.  NPO Instructions were discussed, and the patient's questions were answered prior to conclusion of this encounter

## 2024-02-27 NOTE — CPM/PAT H&P
CPM/PAT Evaluation       Name: Latosha Larson (Latosha Larson)  /Age: 1950/74 y.o.     In-Person       Chief Complaint: Evaluation prior to surgery    HPI  74 year old female scheduled for CYSTOSCOPY WITH ABLATION on 3/5/24 with Dr. Phipps secondary to Malignant neoplasm of urinary bladder. PMHx includes hypothyroid, depression, IBS, Esophageal Reflux, DM, DVT/PE May-thurner syndrome. Presents to [facility] today for pre-operative risk stratification and optimization.   Past Medical History:   Diagnosis Date    Abdominal cramping 2023    Abnormal mammogram 2023    Abscess of eyelid right eye, unspecified eyelid 2021    Eyelid cellulitis, right    Accidental fall 10/08/2023    Allergic rhinitis 2023    Ankle fracture, left 2023    Blood glucose abnormal 10/08/2023    Body mass index (BMI) 33.0-33.9, adult 2021    Body mass index (BMI) of 33.0 to 33.9 in adult    Bursitis of right knee 10/08/2023    Candidiasis, unspecified 2022    Yeast infection    Cardiomegaly     Chronic back pain     Closed dislocation of ankle 10/08/2023    Complement 4 deficiency (CMS/HCC)     denies    Compression of vein 2023    Constipation 2023    Depression     DJD (degenerative joint disease)     DM (diabetes mellitus) (CMS/HCC)     DVT (deep venous thrombosis) (CMS/[facility])       left leg    Dysphagia 2023    Encounter for other preprocedural examination 2022    Preop examination    Encounter for other preprocedural examination 2022    Preop examination    Facial twitching 2023    Headache 2023    History of blood transfusion       after surgery    HLD (hyperlipidemia)     HTN (hypertension)     denies    Hypercalcemia 10/08/2023    Hypothyroidism     IBS (irritable bowel syndrome)     Kidney stone 10/08/2023    Lactic acidosis 10/08/2023    Long term (current) use of antibiotics 2020    Prophylactic antibiotic    Lower leg edema 10/08/2023     Lumbar spondylosis     Lung nodules     multiple    Macular degeneration     Malignant neoplasm of bladder (CMS/[facility])     May-Thurner syndrome     OA (osteoarthritis)     Obesity, unspecified 2022    Class 2 obesity with body mass index (BMI) of 39.0 to 39.9 in adult    Ocular pain, right eye     Discomfort of right eye    Other amnesia 2022    Memory changes    Other forms of dyspnea 2022    AREVALO (dyspnea on exertion)    Palpitations 2023    PE (pulmonary thromboembolism) (CMS/[facility])         Peptic ulcer 07/10/2008    Personal history of other diseases of urinary system 2021    History of hematuria    Personal history of other specified conditions 2021    History of palpitations    Personal history of other specified conditions 2021    History of dysphagia    Right upper quadrant pain 2021    Abdominal pain, RUQ    Sepsis (CMS/[facility]) 10/08/2023    Vitamin D deficiency     Wears glasses     Wears hearing aid in both ears     Yeast infection 2023       Past Surgical History:   Procedure Laterality Date    BALLOON ANGIOPLASTY, ARTERY      iliac artery stent LLE    CT ABDOMEN PELVIS ANGIOGRAM W AND/OR WO IV CONTRAST  2020    CT ABDOMEN PELVIS ANGIOGRAM W AND/OR WO IV CONTRAST [facility]    CYSTOSCOPY      with TURBT  multiple    IR INTERVENTION FILTER PLACEMENT      IVC FILTER RETRIEVAL      ORIF ANKLE FRACTURE Left     OTHER SURGICAL HISTORY  2020     section    OTHER SURGICAL HISTORY  2022    Transurethral resection of bladder tumor    OTHER SURGICAL HISTORY      cone biopsy    OTHER SURGICAL HISTORY      varicose vein ligation       Patient  has no history on file for sexual activity.    No family history on file.    Allergies   Allergen Reactions    Cyclobenzaprine Other, Rash and Hives    Amoxicillin-Pot Clavulanate Other and Nausea/vomiting    Pneumococcal 23-Katelyn Ps Vaccine Other and Hives       Prior to Admission  medications    Medication Sig Start Date End Date Taking? Authorizing Provider   blood sugar diagnostic (OneTouch Ultra Test) strip USE TO TEST BLOOD GLUCOSE THREE TIMES DAILY 2/19/24   FAITH Loaiza   BCG, live, (Marsha BCG) 50 mg injection INSTILL 1 VIAL WEEKLY FOR 3 WEEKS FOR MAINTENCE 8/23/23 2/23/24  Stefany Phipps MD   betamethasone dipropionate (Diprosone) 0.05 % lotion  1/4/24   Historical Provider, MD   chlorhexidine (Peridex) 0.12 % solution RINSE MOUTH WITH 15 ML (1 CAPFUL) FOR 30 SECONDS IN THE MORNING AND EVENING AFTER TOOTHBRUSHING. EXPECTORATE AFTER RINISING DO NOT SWALLOW. 7/20/23   Historical Provider, MD   doxycycline (Monodox) 50 mg capsule Take 1 capsule (50 mg) by mouth once daily. 11/2/23   Historical Provider, MD   Eliquis 5 mg tablet Take 1 tablet (5 mg) by mouth 2 times a day. 2/22/21   Historical Provider, MD   glimepiride (Amaryl) 2 mg tablet TAKE ONE TABLET BY MOUTH EVERY DAY (NEED TO MAKE A ROUTINE APPOINTMENT FOR MORE REFILLS) 8/1/23   Lucila Brown,    ibuprofen (Motrin) capsule Take 2 capsules (400 mg) by mouth if needed (RARELY).    Historical Provider, MD   levothyroxine (Synthroid, Levoxyl) 125 mcg tablet Mon-Sat no meds on Sun 7/17/23   ERICA Maurer-CNP   lidocaine (Xylocaine) 5 % cream cream Use as directed    Historical Provider, MD   liothyronine (Cytomel) 5 mcg tablet Take 1 tablet (5 mcg) by mouth once daily. 1/11/24   FAITH Loaiza   metroNIDAZOLE (Metrogel) 1 % gel  1/3/24   Historical Provider, MD   minoxidil (Loniten) 2.5 mg tablet Take 1 tablet (2.5 mg) by mouth 2 times a day. 11/2/23   Historical Provider, MD   omega 3-dha-epa-fish oil (Fish OiL) 1,200 (144-216) mg capsule Take 1 capsule (1,200 mg) by mouth once daily.    Historical Provider, MD   pantoprazole (ProtoNix) 40 mg EC tablet Take 1 tablet (40 mg) by mouth once daily in the morning. Take before meals. 10/19/21   Historical Provider, MD   sertraline (Zoloft) 50 mg  tablet Take 1 tablet (50 mg) by mouth once daily. 11/16/23   Dipti BOLES Butt, APRN-CNP   triprolidine/pseudoephedrine (ANTIHISTAMINE ORAL) Take 1 tablet by mouth if needed (OTC).    Historical Provider, MD KAREN JONES Physical Exam     Airway    Visit Vitals  /89   Pulse 86   Temp 37 °C (98.6 °F)   Resp 18       DASI Risk Score    No data to display       Caprini DVT Assessment      Flowsheet Row Most Recent Value   DVT Score 8   Current Status Major surgery planned, including arthroscopic and laproscopic (1-2 hours)   History Prior major surgery   Age 60-75 years   BMI 31-40 (Obesity)          Modified Frailty Index    No data to display       CHADS2 Stroke Risk  Current as of 31 minutes ago        N/A 3 - 100%: High Risk   2 - 3%: Medium Risk   0 - 2%: Low Risk     Last Change: N/A          This score determines the patient's risk of having a stroke if the patient has atrial fibrillation.        This score is not applicable to this patient. Components are not calculated.          Revised Cardiac Risk Index      Flowsheet Row Most Recent Value   Revised Cardiac Risk Calculator 0          Apfel Simplified Score      Flowsheet Row Most Recent Value   Apfel Simplified Score Calculator 3          Risk Analysis Index Results This Encounter    No data found in the last 1 encounters.         Assessment and Plan:     Anesthesia:  The patient denies problems with anesthesia in the past such as PONV, prolonged sedation, awareness, dental damage, aspiration, cardiac arrest, difficult intubation, or unexpected hospital admissions.     Neuro:   The patient is at increased risk for perioperative stroke secondary to increased age, female gender, diabetes mellitus. History of depression that is longstanding and chronic, not on medication.    HEENT/Airway  No diagnoses, significant findings on chart review, clinical presentation, or evaluation.    Cardiovascular  11/18/20 Cath Hx DVT PE 11/2020 May-thurner syndrome On  Eliquis  3/6/22 US Lower EXT  FINDINGS:  RIGHT: There is normal compressibility of the common femoral vein,  saphenous femoral junction, profunda femoris, femoral vein and  popliteal vein. The right posterior tibial and peroneal veins  demonstrate normal color flow and compressibility. There is normal  spontaneous and phasic variation throughout the right lower extremity  by spectral doppler.     LEFT: There is normal compressibility of the common femoral vein,  saphenous femoral junction, profunda femoris, femoral vein and  popliteal vein. The left posterior tibial and peroneal veins  demonstrate normal color flow and compressibility. There is normal  spontaneous and phasic variation throughout the left lower extremity  by spectral doppler.     OTHER FINDINGS: None.     IMPRESSION:  No evidence DVT in the bilateral lower extremities    11/16/20 ECHO  FINDINGS:     Procedural Information: Exam quality: fair  LV:       The left ventricular chamber size is normal. There is normal            left  ventricular systolic function. Estimated ejection            fraction  is 55-59%. There is normal ventricular diastolic            filling  observed.  RV:       The right ventricular cavity size is normal. The right            ventricular  global systolic function is normal.  LA:       The left atrial size is normal.  RA:       Right atrial cavity size is normal.  KARLENE:     A trivial pericardial effusion is visualized.  AO:       Aortic root is normal in size.  SVn:      Inferior vena cava is normal.  AV:       Aortic valve is structurally normal. No evidence of            regurgitation.  MV:       Mitral valve is structurally normal. No evidence of            regurgitation.  PV:       Pulmonic valve is structurally normal.  TV:       Tricuspid valve is structurally normal. Trivial tricuspid            regurgitation.  Unable to estimate the right ventricular            systolic  pressure.           Exam quality: fair   The  left ventricular chamber size is normal.   There is normal left ventricular systolic function.   Estimated ejection fraction is 55-59%.   The right ventricular global systolic function is normal.   Aortic valve is structurally normal. No evidence of regurgitation.   Mitral valve is structurally normal. No evidence of regurgitation.   Trivial tricuspid regurgitation.   Unable to estimate the right ventricular systolic pressure.  RCRI  The patient meets 0-1 RCRI criteria and therefore has a less than 1% risk of major adverse cardiac complications.    EKG  The patient has no EKG or echocardiographic changes concerning for myocardial ischemia.   EKG 7/20/23  Normal sinus rhythm  Normal ECG  When compared with ECG of 06-MAR-2022 19:52,  No significant change was found  Heart Failure  The patient has no known history of heart failure.  Additionally, the patient reports no symptoms of heart failure and demonstrates no signs of heart failure.  Hypertension Evaluation  The patient has a known history of hypertension that is uncontrolled.  Patient denies history of hypertension.   Heart Rhythm Evaluation  The patient has no history of arrhythmias.  Heart Valve Evaluation  The patient has no known history of valvular heart disease. The patient has no symptoms or physical exam findings to suggest valvular heart disease.  CARDS EVAL  The patient follows with cardiology, Dr. Frost. Patient was last seen 8/11/23 note: 2/21/24. Per note, Patient is a low risk for cystoscopy with ablation. Hold Eliquis 48 hours pre-op. Resume 12-48 hours post op..  MIC score which indicates a 0.3% risk of intraoperative or 30-day postoperative.    Pulmonary   No significant findings on chart review or clinical presentation and evaluation. The patient is at increased risk of perioperative pulmonary complications secondary to advanced age greater than 60.      ARISCAT 3, low, 1.6% risk of in-hospital postoperative pulmonary complications  PRODIGY 12,  intermediate risk of respiratory depression episode.    Hematology  The patient has diagnoses or significant findings on chart review or clinical presentation and evaluation significant for May thurner syndrome.  Antiplatelet management   The patient is currently receiving antiplatelet therapy for history of DVT/PE.11/2020.On Eliquis Hold Eliquis 48 hours pre-op. Resume 12-48 hours post op..  Anticoagulation management  The patient is not currently receiving anticoagulation therapy.  Caprini score 8, high risk of perioperative VTE    Gastrointestinal  The patient has diagnoses or significant findings on chart review or clinical presentation and evaluation significant for IBS, Esophageal Reflux controlled on pantoprazole.  Eat 10- 0,  self-perceived oropharyngeal dysphagia scale (0-40)     Genitourinary  The patient has diagnoses or significant findings on chart review or clinical presentation and evaluation significant for Malignant neoplasm of urinary bladder  Most recrent TURBT 8/1/23, Cysto 2/5/24 Hx LG bladder cancer, recurrent, s/p intravesical BCG    Renal  The patient has no known history of chronic kidney disease.    Musculoskeletal  No diagnoses or significant findings on chart review or clinical presentation and evaluation.    Endocrine  Diabetes Evaluation  The patient has history of diabetes mellitus controlled by metformin  Thyroid Disease Evaluation  The patient has a history of thyroid disease and is medicated with T4 and cytomel.    ID  No diagnoses or significant findings on chart review or clinical presentation and evaluation.    -Preoperative medication instructions were provided and reviewed with the patient.  Any additional testing or evaluation was explained to the patient.  NPO Instructions were discussed, and the patient's questions were answered prior to conclusion of this encounter

## 2024-02-28 LAB
HOLD SPECIMEN: NORMAL
T3FREE SERPL-MCNC: 2.6 PG/ML (ref 2.3–4.2)

## 2024-02-29 ENCOUNTER — ANESTHESIA EVENT (OUTPATIENT)
Dept: OPERATING ROOM | Facility: HOSPITAL | Age: 74
End: 2024-02-29
Payer: MEDICARE

## 2024-03-04 ENCOUNTER — TELEPHONE (OUTPATIENT)
Dept: PRIMARY CARE | Facility: CLINIC | Age: 74
End: 2024-03-04
Payer: MEDICARE

## 2024-03-04 DIAGNOSIS — E07.9 THYROID DISORDER: ICD-10-CM

## 2024-03-04 RX ORDER — ACETAMINOPHEN 325 MG/1
650 TABLET ORAL EVERY 4 HOURS PRN
Status: CANCELLED | OUTPATIENT
Start: 2024-03-04

## 2024-03-04 RX ORDER — OXYCODONE HYDROCHLORIDE 5 MG/1
5 TABLET ORAL EVERY 4 HOURS PRN
Status: CANCELLED | OUTPATIENT
Start: 2024-03-04

## 2024-03-04 RX ORDER — OXYCODONE HYDROCHLORIDE 5 MG/1
10 TABLET ORAL EVERY 4 HOURS PRN
Status: CANCELLED | OUTPATIENT
Start: 2024-03-04

## 2024-03-04 RX ORDER — SODIUM CHLORIDE, SODIUM LACTATE, POTASSIUM CHLORIDE, CALCIUM CHLORIDE 600; 310; 30; 20 MG/100ML; MG/100ML; MG/100ML; MG/100ML
100 INJECTION, SOLUTION INTRAVENOUS CONTINUOUS
Status: CANCELLED | OUTPATIENT
Start: 2024-03-04

## 2024-03-04 RX ORDER — LEVOTHYROXINE SODIUM 125 UG/1
TABLET ORAL
Qty: 90 TABLET | Refills: 1 | Status: SHIPPED | OUTPATIENT
Start: 2024-03-04 | End: 2024-03-05 | Stop reason: HOSPADM

## 2024-03-04 RX ORDER — LIDOCAINE HYDROCHLORIDE 10 MG/ML
0.1 INJECTION, SOLUTION EPIDURAL; INFILTRATION; INTRACAUDAL; PERINEURAL ONCE
Status: CANCELLED | OUTPATIENT
Start: 2024-03-04 | End: 2024-03-04

## 2024-03-04 NOTE — TELEPHONE ENCOUNTER
Patient called and advised that she was NOT Supposed to STOP the synthroid. She needs to go back on that asap. She was only supposed to start the cytomel. Patient understood.

## 2024-03-05 ENCOUNTER — HOSPITAL ENCOUNTER (OUTPATIENT)
Facility: HOSPITAL | Age: 74
Setting detail: OUTPATIENT SURGERY
Discharge: HOME | End: 2024-03-05
Attending: STUDENT IN AN ORGANIZED HEALTH CARE EDUCATION/TRAINING PROGRAM | Admitting: STUDENT IN AN ORGANIZED HEALTH CARE EDUCATION/TRAINING PROGRAM
Payer: MEDICARE

## 2024-03-05 ENCOUNTER — ANESTHESIA (OUTPATIENT)
Dept: OPERATING ROOM | Facility: HOSPITAL | Age: 74
End: 2024-03-05
Payer: MEDICARE

## 2024-03-05 VITALS
OXYGEN SATURATION: 98 % | TEMPERATURE: 97.5 F | HEART RATE: 84 BPM | SYSTOLIC BLOOD PRESSURE: 136 MMHG | WEIGHT: 200.62 LBS | HEIGHT: 64 IN | RESPIRATION RATE: 16 BRPM | DIASTOLIC BLOOD PRESSURE: 82 MMHG | BODY MASS INDEX: 34.25 KG/M2

## 2024-03-05 DIAGNOSIS — C67.9 MALIGNANT NEOPLASM OF URINARY BLADDER, UNSPECIFIED SITE (MULTI): ICD-10-CM

## 2024-03-05 LAB
GLUCOSE BLD MANUAL STRIP-MCNC: 114 MG/DL (ref 74–99)
GLUCOSE BLD MANUAL STRIP-MCNC: 164 MG/DL (ref 74–99)

## 2024-03-05 PROCEDURE — 7100000001 HC RECOVERY ROOM TIME - INITIAL BASE CHARGE: Performed by: STUDENT IN AN ORGANIZED HEALTH CARE EDUCATION/TRAINING PROGRAM

## 2024-03-05 PROCEDURE — 7100000009 HC PHASE TWO TIME - INITIAL BASE CHARGE: Performed by: STUDENT IN AN ORGANIZED HEALTH CARE EDUCATION/TRAINING PROGRAM

## 2024-03-05 PROCEDURE — 3700000002 HC GENERAL ANESTHESIA TIME - EACH INCREMENTAL 1 MINUTE: Performed by: STUDENT IN AN ORGANIZED HEALTH CARE EDUCATION/TRAINING PROGRAM

## 2024-03-05 PROCEDURE — 3600000003 HC OR TIME - INITIAL BASE CHARGE - PROCEDURE LEVEL THREE: Performed by: STUDENT IN AN ORGANIZED HEALTH CARE EDUCATION/TRAINING PROGRAM

## 2024-03-05 PROCEDURE — 82947 ASSAY GLUCOSE BLOOD QUANT: CPT

## 2024-03-05 PROCEDURE — A52234 PR CYSTOURETHROSCOPY,FULGUR 0.5-2 CM LESN: Performed by: NURSE ANESTHETIST, CERTIFIED REGISTERED

## 2024-03-05 PROCEDURE — 99231 SBSQ HOSP IP/OBS SF/LOW 25: CPT | Performed by: NURSE PRACTITIONER

## 2024-03-05 PROCEDURE — 3700000001 HC GENERAL ANESTHESIA TIME - INITIAL BASE CHARGE: Performed by: STUDENT IN AN ORGANIZED HEALTH CARE EDUCATION/TRAINING PROGRAM

## 2024-03-05 PROCEDURE — 7100000002 HC RECOVERY ROOM TIME - EACH INCREMENTAL 1 MINUTE: Performed by: STUDENT IN AN ORGANIZED HEALTH CARE EDUCATION/TRAINING PROGRAM

## 2024-03-05 PROCEDURE — 2500000005 HC RX 250 GENERAL PHARMACY W/O HCPCS: Performed by: NURSE ANESTHETIST, CERTIFIED REGISTERED

## 2024-03-05 PROCEDURE — 2500000004 HC RX 250 GENERAL PHARMACY W/ HCPCS (ALT 636 FOR OP/ED): Performed by: NURSE ANESTHETIST, CERTIFIED REGISTERED

## 2024-03-05 PROCEDURE — 88307 TISSUE EXAM BY PATHOLOGIST: CPT | Mod: TC,GEALAB | Performed by: STUDENT IN AN ORGANIZED HEALTH CARE EDUCATION/TRAINING PROGRAM

## 2024-03-05 PROCEDURE — 52234 CYSTOSCOPY AND TREATMENT: CPT | Performed by: STUDENT IN AN ORGANIZED HEALTH CARE EDUCATION/TRAINING PROGRAM

## 2024-03-05 PROCEDURE — 2500000004 HC RX 250 GENERAL PHARMACY W/ HCPCS (ALT 636 FOR OP/ED): Performed by: ANESTHESIOLOGY

## 2024-03-05 PROCEDURE — 88307 TISSUE EXAM BY PATHOLOGIST: CPT | Performed by: PATHOLOGY

## 2024-03-05 PROCEDURE — 3600000008 HC OR TIME - EACH INCREMENTAL 1 MINUTE - PROCEDURE LEVEL THREE: Performed by: STUDENT IN AN ORGANIZED HEALTH CARE EDUCATION/TRAINING PROGRAM

## 2024-03-05 PROCEDURE — 7100000010 HC PHASE TWO TIME - EACH INCREMENTAL 1 MINUTE: Performed by: STUDENT IN AN ORGANIZED HEALTH CARE EDUCATION/TRAINING PROGRAM

## 2024-03-05 RX ORDER — ONDANSETRON HYDROCHLORIDE 2 MG/ML
INJECTION, SOLUTION INTRAVENOUS AS NEEDED
Status: DISCONTINUED | OUTPATIENT
Start: 2024-03-05 | End: 2024-03-05

## 2024-03-05 RX ORDER — SODIUM CHLORIDE, SODIUM LACTATE, POTASSIUM CHLORIDE, CALCIUM CHLORIDE 600; 310; 30; 20 MG/100ML; MG/100ML; MG/100ML; MG/100ML
100 INJECTION, SOLUTION INTRAVENOUS CONTINUOUS
Status: DISCONTINUED | OUTPATIENT
Start: 2024-03-05 | End: 2024-03-05 | Stop reason: HOSPADM

## 2024-03-05 RX ORDER — MIDAZOLAM HYDROCHLORIDE 1 MG/ML
INJECTION, SOLUTION INTRAMUSCULAR; INTRAVENOUS AS NEEDED
Status: DISCONTINUED | OUTPATIENT
Start: 2024-03-05 | End: 2024-03-05

## 2024-03-05 RX ORDER — LIDOCAINE HYDROCHLORIDE 20 MG/ML
INJECTION, SOLUTION INFILTRATION; PERINEURAL AS NEEDED
Status: DISCONTINUED | OUTPATIENT
Start: 2024-03-05 | End: 2024-03-05

## 2024-03-05 RX ORDER — CEFAZOLIN 1 G/1
INJECTION, POWDER, FOR SOLUTION INTRAVENOUS AS NEEDED
Status: DISCONTINUED | OUTPATIENT
Start: 2024-03-05 | End: 2024-03-05

## 2024-03-05 RX ORDER — PROPOFOL 10 MG/ML
INJECTION, EMULSION INTRAVENOUS AS NEEDED
Status: DISCONTINUED | OUTPATIENT
Start: 2024-03-05 | End: 2024-03-05

## 2024-03-05 RX ADMIN — SODIUM CHLORIDE, POTASSIUM CHLORIDE, SODIUM LACTATE AND CALCIUM CHLORIDE 100 ML/HR: 600; 310; 30; 20 INJECTION, SOLUTION INTRAVENOUS at 11:49

## 2024-03-05 RX ADMIN — CEFAZOLIN 2 G: 1 INJECTION, POWDER, FOR SOLUTION INTRAMUSCULAR; INTRAVENOUS at 13:57

## 2024-03-05 RX ADMIN — MIDAZOLAM 2 MG: 1 INJECTION INTRAMUSCULAR; INTRAVENOUS at 13:41

## 2024-03-05 RX ADMIN — ONDANSETRON 4 MG: 2 INJECTION INTRAMUSCULAR; INTRAVENOUS at 14:14

## 2024-03-05 RX ADMIN — PROPOFOL 200 MG: 10 INJECTION, EMULSION INTRAVENOUS at 13:53

## 2024-03-05 RX ADMIN — SODIUM CHLORIDE, SODIUM LACTATE, POTASSIUM CHLORIDE, AND CALCIUM CHLORIDE: 600; 310; 30; 20 INJECTION, SOLUTION INTRAVENOUS at 13:47

## 2024-03-05 RX ADMIN — LIDOCAINE HYDROCHLORIDE 100 MG: 20 INJECTION, SOLUTION INFILTRATION; PERINEURAL at 13:53

## 2024-03-05 SDOH — HEALTH STABILITY: MENTAL HEALTH: CURRENT SMOKER: 0

## 2024-03-05 ASSESSMENT — PAIN SCALES - GENERAL
PAINLEVEL_OUTOF10: 0 - NO PAIN

## 2024-03-05 ASSESSMENT — PAIN - FUNCTIONAL ASSESSMENT
PAIN_FUNCTIONAL_ASSESSMENT: 0-10
PAIN_FUNCTIONAL_ASSESSMENT: 0-10

## 2024-03-05 NOTE — ANESTHESIA PROCEDURE NOTES
Airway  Date/Time: 3/5/2024 1:54 PM  Urgency: elective    Airway not difficult    Staffing  Performed: CRNA   Authorized by: DORYS Dubose    Performed by: ERICA Dubose-SANA  Patient location during procedure: OR    Indications and Patient Condition  Indications for airway management: anesthesia  Spontaneous Ventilation: absent  Patient position: sniffing  Mask difficulty assessment: 0 - not attempted    Final Airway Details  Final airway type: supraglottic airway (igel)      Successful airway: Size 4     Number of attempts at approach: 1

## 2024-03-05 NOTE — ANESTHESIA POSTPROCEDURE EVALUATION
Patient: Latosha Larson    Procedure Summary       Date: 03/05/24 Room / Location: [facility] OR 01 / Virtual [facility] OR    Anesthesia Start: 1347 Anesthesia Stop: 1425    Procedure: CYSTOSCOPY WITH TRANSURETHRAL RESECTION OF TUMOR Diagnosis:       Malignant neoplasm of urinary bladder, unspecified site (CMS/HCC)      (Malignant neoplasm of urinary bladder, unspecified site (CMS/HCC) [C67.9])    Surgeons: Stefany Phipps MD Responsible Provider: DORYS Dubose    Anesthesia Type: general ASA Status: 3            Anesthesia Type: general    Vitals Value Taken Time   /82 03/05/24 1455   Temp  03/05/24 1638   Pulse 84 03/05/24 1455   Resp 16 03/05/24 1455   SpO2 98 % 03/05/24 1455       Anesthesia Post Evaluation    Patient location during evaluation: PACU  Patient participation: complete - patient participated  Level of consciousness: awake  Pain management: adequate  Multimodal analgesia pain management approach  Airway patency: patent  Two or more strategies used to mitigate risk of obstructive sleep apnea  Cardiovascular status: acceptable  Respiratory status: acceptable  Hydration status: acceptable  Postoperative Nausea and Vomiting: none        No notable events documented.

## 2024-03-05 NOTE — DISCHARGE SUMMARY
Discharge Diagnosis  Malignant neoplasm of urinary bladder (CMS/HCC)    Issues Requiring Follow-Up  Post surgical follow up     Test Results Pending At Discharge  Pending Labs       No current pending labs.            Hospital Course  74 year old female who underwent cystoscopy with ablation.  No intraoperative complications and she was discharged to PACU in stable condition.  Doing well, planning for DC home.    Pertinent Physical Exam At Time of Discharge  Physical Exam  Vitals reviewed.   HENT:      Head: Normocephalic and atraumatic.   Eyes:      Extraocular Movements: Extraocular movements intact.      Conjunctiva/sclera: Conjunctivae normal.      Pupils: Pupils are equal, round, and reactive to light.   Cardiovascular:      Rate and Rhythm: Normal rate and regular rhythm.      Pulses: Normal pulses.   Pulmonary:      Breath sounds: Normal breath sounds.   Abdominal:      General: Bowel sounds are normal.      Palpations: Abdomen is soft.   Skin:     General: Skin is warm and dry.      Capillary Refill: Capillary refill takes less than 2 seconds.   Neurological:      General: No focal deficit present.      Mental Status: She is alert and oriented to person, place, and time.         Home Medications     Medication List      CONTINUE taking these medications     betamethasone dipropionate 0.05 % lotion; Commonly known as: Diprosone   cholecalciferol 125 MCG (5000 UT) capsule; Commonly known as: Vitamin   D-3   cyanocobalamin 100 mcg tablet; Commonly known as: Vitamin B-12   doxycycline 50 mg capsule; Commonly known as: Monodox   Eliquis 5 mg tablet; Generic drug: apixaban   Fish OiL 1,200 (144-216) mg capsule; Generic drug: omega 3-dha-epa-fish   oil   lidocaine 5 % cream cream; Commonly known as: Xylocaine   liothyronine 5 mcg tablet; Commonly known as: Cytomel; Take 1 tablet (5   mcg) by mouth once daily.   metFORMIN 500 mg tablet; Commonly known as: Glucophage   metroNIDAZOLE 1 % gel; Commonly known as:  Metrogel   minoxidil 2.5 mg tablet; Commonly known as: Loniten   pantoprazole 40 mg EC tablet; Commonly known as: ProtoNix     STOP taking these medications     chlorhexidine 0.12 % solution; Commonly known as: Peridex   glimepiride 2 mg tablet; Commonly known as: Amaryl   ibuprofen capsule; Commonly known as: Motrin   levothyroxine 125 mcg tablet; Commonly known as: Synthroid, Levoxyl   OneTouch Ultra Test strip; Generic drug: blood sugar diagnostic       Outpatient Follow-Up  Future Appointments   Date Time Provider Department [place]   3/12/2024  1:40 PM Dipti Aleman APRN-CNP [id number] [facility]   3/18/2024  2:00 PM Ayaz Glover MD [id number] [facility]   3/22/2024 11:20 AM Stefany Phipps MD [id number] [facility]   6/5/2024  2:00 PM Anupama Frost MD, MS [id number] [facility]       Alyssa Herrera APRN-CNP

## 2024-03-05 NOTE — ANESTHESIA PREPROCEDURE EVALUATION
Patient: Latosha Larson    Procedure Information       Date/Time: 24 1245    Procedure: CYSTOSCOPY WITH TRANSURETHRAL RESECTION OF TUMOR    [facility]    Surgeons: Stefany Phipps MD          Vitals:    24 1104   BP: (!) 164/98   Pulse: 100   Temp: 36.4 °C (97.5 °F)   SpO2: 98%       Past Surgical History:   Procedure Laterality Date    BALLOON ANGIOPLASTY, ARTERY      iliac artery stent LLE    CT ABDOMEN PELVIS ANGIOGRAM W AND/OR WO IV CONTRAST  2020    CT ABDOMEN PELVIS ANGIOGRAM W AND/OR WO IV CONTRAST [facility] INPATIENT LEGACY    CYSTOSCOPY      with TURBT  multiple    IR INTERVENTION FILTER PLACEMENT      IVC FILTER RETRIEVAL      ORIF ANKLE FRACTURE Left     OTHER SURGICAL HISTORY  2020     section    OTHER SURGICAL HISTORY  2022    Transurethral resection of bladder tumor    OTHER SURGICAL HISTORY      cone biopsy    OTHER SURGICAL HISTORY      varicose vein ligation     Past Medical History:   Diagnosis Date    Abdominal cramping 2023    Abnormal mammogram 2023    Abscess of eyelid right eye, unspecified eyelid 2021    Eyelid cellulitis, right    Accidental fall 10/08/2023    Allergic rhinitis 2023    Ankle fracture, left 2023    Blood glucose abnormal 10/08/2023    Body mass index (BMI) 33.0-33.9, adult 2021    Body mass index (BMI) of 33.0 to 33.9 in adult    Bursitis of right knee 10/08/2023    Candidiasis, unspecified 2022    Yeast infection    Cardiomegaly     Chronic back pain     Class 2 obesity with body mass index (BMI) of 35.0 to 35.9 in adult 2024    35.31 kg/m²    Closed dislocation of ankle 10/08/2023    Complement 4 deficiency (CMS/HCC)     denies    Compression of vein 2023    Constipation 2023    Depression     DJD (degenerative joint disease)     DM (diabetes mellitus) (CMS/HCC)     DVT (deep venous thrombosis) (CMS/HCC)       left leg    Dysphagia 2023     Encounter for other preprocedural examination 07/18/2022    Preop examination    Encounter for other preprocedural examination 02/17/2022    Preop examination    Facial twitching 07/03/2023    Headache 07/03/2023    History of blood transfusion     2020  after surgery    HLD (hyperlipidemia)     HTN (hypertension)     denies    Hypercalcemia 10/08/2023    Hypothyroidism     IBS (irritable bowel syndrome)     Kidney stone 10/08/2023    Lactic acidosis 10/08/2023    Long term (current) use of antibiotics 12/17/2020    Prophylactic antibiotic    Lower leg edema 10/08/2023    Lumbar spondylosis     Lung nodules     multiple    Macular degeneration     Malignant neoplasm of bladder (CMS/[facility])     May-Thurner syndrome     OA (osteoarthritis)     Obesity, unspecified 05/23/2022    Class 2 obesity with body mass index (BMI) of 39.0 to 39.9 in adult    Ocular pain, right eye     Discomfort of right eye    Other amnesia 04/11/2022    Memory changes    Other forms of dyspnea 06/03/2022    AREVALO (dyspnea on exertion)    Palpitations 07/03/2023    PE (pulmonary thromboembolism) (CMS/[facility])     2020    Peptic ulcer 07/10/2008    Personal history of other diseases of urinary system 05/05/2021    History of hematuria    Personal history of other specified conditions 03/16/2021    History of palpitations    Personal history of other specified conditions 09/03/2021    History of dysphagia    Right upper quadrant pain 09/03/2021    Abdominal pain, RUQ    Sepsis (CMS/[facility]) 10/08/2023    Vitamin D deficiency     Wears glasses     Wears hearing aid in both ears     Yeast infection 07/03/2023       Current Facility-Administered Medications:     lactated Ringer's infusion, 100 mL/hr, intravenous, Continuous, Urbano Zhu MD, Last Rate: 100 mL/hr at 03/05/24 1149, 100 mL/hr at 03/05/24 1149  Prior to Admission medications    Medication Sig Start Date End Date Taking? Authorizing Provider   betamethasone dipropionate (Diprosone) 0.05 % lotion  once daily. 1/4/24  Yes Historical Provider, MD   blood sugar diagnostic (OneTouch Ultra Test) strip USE TO TEST BLOOD GLUCOSE THREE TIMES DAILY  Patient not taking: Reported on 2/27/2024 2/19/24   FAITH Loaiza   cholecalciferol (Vitamin D-3) 125 MCG (5000 UT) capsule Take 1 capsule (125 mcg) by mouth once daily.   Yes Historical Provider, MD   cyanocobalamin (Vitamin B-12) 100 mcg tablet Take 1 tablet (100 mcg) by mouth once daily.   Yes Historical Provider, MD   doxycycline (Monodox) 50 mg capsule Take 1 capsule (50 mg) by mouth once daily. 11/2/23  Yes Historical Provider, MD   Eliquis 5 mg tablet Take 1 tablet (5 mg) by mouth 2 times a day. 2/22/21  Yes Historical Provider, MD   ibuprofen (Motrin) capsule Take 2 capsules (400 mg) by mouth if needed (RARELY).   Yes Historical Provider, MD   liothyronine (Cytomel) 5 mcg tablet Take 1 tablet (5 mcg) by mouth once daily. 1/11/24  Yes FAITH Loaiza   metFORMIN (Glucophage) 500 mg tablet Take 1 tablet (500 mg) by mouth 2 times a day with meals.   Yes Historical Provider, MD   metroNIDAZOLE (Metrogel) 1 % gel  1/3/24  Yes Historical Provider, MD   minoxidil (Loniten) 2.5 mg tablet Take 1 tablet (2.5 mg) by mouth 2 times a day. 11/2/23  Yes Historical Provider, MD   omega 3-dha-epa-fish oil (Fish OiL) 1,200 (144-216) mg capsule Take 1 capsule (1,200 mg) by mouth once daily.   Yes Historical Provider, MD   pantoprazole (ProtoNix) 40 mg EC tablet Take 1 tablet (40 mg) by mouth once daily in the morning. Take before meals. 10/19/21  Yes Historical Provider, MD   chlorhexidine (Peridex) 0.12 % solution RINSE MOUTH WITH 15 ML (1 CAPFUL) FOR 30 SECONDS IN THE MORNING AND EVENING AFTER TOOTHBRUSHING. EXPECTORATE AFTER RINISING DO NOT SWALLOW. 7/20/23   Historical Provider, MD   glimepiride (Amaryl) 2 mg tablet TAKE ONE TABLET BY MOUTH EVERY DAY (NEED TO MAKE A ROUTINE APPOINTMENT FOR MORE REFILLS)  Patient not taking: Reported on 2/27/2024 8/1/23   Lucila VASQUEZ  DO Kevin   levothyroxine (Synthroid, Levoxyl) 125 mcg tablet Mon-Sat no meds on Sun  Patient not taking: Reported on 3/5/2024 3/4/24   FAITH Loaiza   lidocaine (Xylocaine) 5 % cream cream Use as directed    Historical Provider, MD     Allergies   Allergen Reactions    Cyclobenzaprine Other, Rash and Hives    Amoxicillin-Pot Clavulanate Other and Nausea/vomiting    Pneumococcal 23-Katelyn Ps Vaccine Other and Hives     Social History     Tobacco Use    Smoking status: Former     Types: Cigarettes     Quit date:      Years since quittin.1    Smokeless tobacco: Never   Substance Use Topics    Alcohol use: Yes     Comment: occasionally         Chemistry    Lab Results   Component Value Date/Time     2024 1420    K 4.8 2024 1420     2024 1420    CO2 24 2024 1420    BUN 21 2024 1420    CREATININE 0.93 2024 1420    Lab Results   Component Value Date/Time    CALCIUM 10.6 (H) 2024 1420    ALKPHOS 62 2023 0929    AST 17 2023 0929    ALT 18 2023 0929    BILITOT 0.9 2023 0929          Lab Results   Component Value Date/Time    WBC 7.4 2024 1420    HGB 12.9 2024 1420    HCT 40.6 2024 1420     2024 1420     Lab Results   Component Value Date/Time    PROTIME 10.9 2023 1316    INR 1.0 2023 1316     No results found for this or any previous visit (from the past 4464 hour(s)).  No results found for this or any previous visit from the past 1095 days.    Relevant Problems   Cardiovascular   (+) Embolism and thrombosis of arteries of the lower extremities (CMS/[facility])   (+) Hypertensive disorder   (+) Mixed hyperlipidemia      Endocrine   (+) Acquired hypothyroidism   (+) Class 1 obesity with body mass index (BMI) of 34.0 to 34.9 in adult   (+) Type 2 diabetes mellitus (CMS/[facility])      GI   (+) Irritable bowel syndrome with diarrhea      /Renal   (+) Acute renal failure syndrome (CMS/[facility])      Neuro/Psych    (+) Anxiety   (+) Depression, major, single episode, mild (CMS/HCC)      Pulmonary   (+) Multiple lung nodules      Hematology   (+) 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)      Musculoskeletal   (+) Degenerative joint disease   (+) Dextroscoliosis   (+) Lumbar spondylosis       Clinical information reviewed:   Tobacco  Allergies  Meds  Problems  Med Hx  Surg Hx  OB Status    Fam Hx  Soc Hx        NPO Detail:  NPO/Void Status  Carbohydrate Drink Given Prior to Surgery? : N  Date of Last Liquid: 03/05/24  Time of Last Liquid: 2300  Date of Last Solid: 03/05/24  Time of Last Solid: 2300  Last Intake Type: Clear fluids  Time of Last Void: 1100         Physical Exam    Airway  Mallampati: II     Cardiovascular - normal exam     Dental    Pulmonary    Abdominal            Anesthesia Plan    History of general anesthesia?: yes  History of complications of general anesthesia?: no    ASA 3     general     The patient is not a current smoker.  Patient was not previously instructed to abstain from smoking on day of procedure.  Patient did not smoke on day of procedure.  Education provided regarding risk of obstructive sleep apnea.  intravenous induction   Anesthetic plan and risks discussed with patient.    Plan discussed with CRNA.

## 2024-03-05 NOTE — OP NOTE
CYSTOSCOPY WITH TRANSURETHRAL RESECTION OF TUMOR Operative Note     Date: 3/5/2024  OR Location: [facility]    Name: Latosha Larson, : 1950, Age: 74 y.o., MRN: [id number], Sex: female    Diagnosis  Pre-op Diagnosis     * Malignant neoplasm of urinary bladder, unspecified site (CMS/HCC) [C67.9] Post-op Diagnosis     * Malignant neoplasm of urinary bladder, unspecified site (CMS/HCC) [C67.9]     Procedures  CYSTOSCOPY WITH TRANSURETHRAL RESECTION OF TUMOR  478[address] - PA CYSTO W/REMOVAL OF TUMORS SMALL      Surgeons      * Stefany Phipps - Primary    Resident/Fellow/Other Assistant:  Surgeon(s) and Role:    Procedure Summary  Anesthesia: General  ASA: III  Anesthesia Staff: CRNA: ERICA Dubose-CRNA  Estimated Blood Loss: 1mL  Intra-op Medications: Administrations occurring from 1245 to 1415 on 24:  * No intraprocedure medications in log *           Anesthesia Record               Intraprocedure I/O Totals          Intake    LR bolus 700.00 mL    Total Intake 700 mL          Specimen:   ID Type Source Tests Collected by Time   1 : LEFT TRIGONE Tissue BLADDER TRANSURETHRAL RESECTION SURGICAL PATHOLOGY EXAM Stefany Phipps MD 3/5/2024 1420   2 : RIGHT TRIGONE Tissue BLADDER TRANSURETHRAL RESECTION SURGICAL PATHOLOGY EXAM Stefany Phipps MD 3/5/2024 1421        Staff:   Circulator: Aaron Mitchell RN  Scrub Person: Niki Morrow         Drains and/or Catheters: * None in log *        Findings: 5mm area over right hemitrigone, 3mm area of mucosal abnormality over left hemitrigone    Indications: Latosha Larson is an 74 y.o. female who is having surgery for Malignant neoplasm of urinary bladder, unspecified site (CMS/HCC) [C67.9]. Multiple prior recurrences.    The patient was seen in the preoperative area. The risks, benefits, complications, treatment options, non-operative alternatives, expected recovery and outcomes were discussed with the patient. The possibilities of reaction to medication,  pulmonary aspiration, injury to surrounding structures, bleeding, recurrent infection, the need for additional procedures, failure to diagnose a condition, and creating a complication requiring transfusion or operation were discussed with the patient. The patient concurred with the proposed plan, giving informed consent.  The site of surgery was properly noted/marked if necessary per policy. The patient has been actively warmed in preoperative area. Preoperative antibiotics have been ordered and given within 1 hours of incision. Venous thrombosis prophylaxis have been ordered including bilateral sequential compression devices    Procedure Details: Patient was consented and antibiotics were started on-call to the OR.  In the operating room, under general anesthesia, patient placed in dorsolithotomy position, genitalia was prepped and draped in usual manner.  No 26 resectoscope was inserted down the urethra into the bladder, and cystoscopy revealed the 5mm area over right hemitrigone, 3mm area of mucosal abnormality over left hemitrigone.  Using the loop on the working element, the area of tumor was resected down to the muscle and fat layer.  This was done the whole surface of the tumor.  Hemostasis was performed for the underlying base of the tumor as well as the surrounding mucosa.  The resected chips were removed and sent separately for pathological examination.   No Busch catheter was inserted.  Patient was awakened and transferred to PACU in stable condition.    Complications:  None; patient tolerated the procedure well.    Disposition: PACU - hemodynamically stable.  Condition: stable         Attending Attestation: I performed the procedure.    Stefany Phipps  Phone Number: [phone]

## 2024-03-11 DIAGNOSIS — E07.9 THYROID DISORDER: ICD-10-CM

## 2024-03-11 RX ORDER — LEVOTHYROXINE SODIUM 125 UG/1
TABLET ORAL
Qty: 90 TABLET | Refills: 1 | Status: SHIPPED | OUTPATIENT
Start: 2024-03-11

## 2024-03-12 ENCOUNTER — OFFICE VISIT (OUTPATIENT)
Dept: PRIMARY CARE | Facility: CLINIC | Age: 74
End: 2024-03-12
Payer: MEDICARE

## 2024-03-12 VITALS
HEIGHT: 64 IN | HEART RATE: 93 BPM | WEIGHT: 203 LBS | OXYGEN SATURATION: 97 % | SYSTOLIC BLOOD PRESSURE: 138 MMHG | BODY MASS INDEX: 34.66 KG/M2 | DIASTOLIC BLOOD PRESSURE: 87 MMHG

## 2024-03-12 DIAGNOSIS — E03.9 ACQUIRED HYPOTHYROIDISM: Primary | ICD-10-CM

## 2024-03-12 LAB
LABORATORY COMMENT REPORT: NORMAL
PATH REPORT.FINAL DX SPEC: NORMAL
PATH REPORT.GROSS SPEC: NORMAL
PATH REPORT.RELEVANT HX SPEC: NORMAL
PATH REPORT.TOTAL CANCER: NORMAL

## 2024-03-12 PROCEDURE — 1125F AMNT PAIN NOTED PAIN PRSNT: CPT | Performed by: NURSE PRACTITIONER

## 2024-03-12 PROCEDURE — 3079F DIAST BP 80-89 MM HG: CPT | Performed by: NURSE PRACTITIONER

## 2024-03-12 PROCEDURE — 1036F TOBACCO NON-USER: CPT | Performed by: NURSE PRACTITIONER

## 2024-03-12 PROCEDURE — 1159F MED LIST DOCD IN RCRD: CPT | Performed by: NURSE PRACTITIONER

## 2024-03-12 PROCEDURE — 3075F SYST BP GE 130 - 139MM HG: CPT | Performed by: NURSE PRACTITIONER

## 2024-03-12 PROCEDURE — 1157F ADVNC CARE PLAN IN RCRD: CPT | Performed by: NURSE PRACTITIONER

## 2024-03-12 PROCEDURE — 99213 OFFICE O/P EST LOW 20 MIN: CPT | Performed by: NURSE PRACTITIONER

## 2024-03-12 NOTE — PATIENT INSTRUCTIONS
Get back on the levothyroxine asap please call if [facility] does  not have the prescription  Stay on the Cytomel.   For you're next visit please bring all bottles of medications for me to review.

## 2024-03-18 ENCOUNTER — APPOINTMENT (OUTPATIENT)
Dept: OPHTHALMOLOGY | Facility: CLINIC | Age: 74
End: 2024-03-18
Payer: MEDICARE

## 2024-03-22 ENCOUNTER — APPOINTMENT (OUTPATIENT)
Dept: UROLOGY | Facility: CLINIC | Age: 74
End: 2024-03-22
Payer: MEDICARE

## 2024-04-01 ENCOUNTER — OFFICE VISIT (OUTPATIENT)
Dept: UROLOGY | Facility: CLINIC | Age: 74
End: 2024-04-01
Payer: MEDICARE

## 2024-04-01 ENCOUNTER — APPOINTMENT (OUTPATIENT)
Dept: UROLOGY | Facility: CLINIC | Age: 74
End: 2024-04-01
Payer: MEDICARE

## 2024-04-01 DIAGNOSIS — C67.9 MALIGNANT NEOPLASM OF URINARY BLADDER, UNSPECIFIED SITE (MULTI): Primary | ICD-10-CM

## 2024-04-01 PROCEDURE — 99213 OFFICE O/P EST LOW 20 MIN: CPT | Performed by: STUDENT IN AN ORGANIZED HEALTH CARE EDUCATION/TRAINING PROGRAM

## 2024-04-01 PROCEDURE — 1157F ADVNC CARE PLAN IN RCRD: CPT | Performed by: STUDENT IN AN ORGANIZED HEALTH CARE EDUCATION/TRAINING PROGRAM

## 2024-04-01 PROCEDURE — 1159F MED LIST DOCD IN RCRD: CPT | Performed by: STUDENT IN AN ORGANIZED HEALTH CARE EDUCATION/TRAINING PROGRAM

## 2024-04-01 NOTE — PROGRESS NOTES
Subjective   Patient ID: Latosha Larson is a 74 y.o. female.    HPI  74 y.o. female s/p cystoscopy with transurethral resection of tumor 3/5/24.     She is s/p [facility].     She recovered well from procedure. No urinary symptoms at this time.     FINAL DIAGNOSIS   A.  Urinary bladder, left trigone, transurethral resection:  --Fragment of nonkeratinizing squamous mucosa, with no evidence of malignancy.     B.  Urinary bladder, right trigone, transurethral resection:  --Polypoid fragment of urothelial mucosa with inflammation and reactive changes.  --No definitive evidence of malignancy.     Review of Systems    Objective   Physical Exam    Assessment/Plan   74 y.o. female s/p cystoscopy with transurethral resection of tumor 3/5/24.     She is s/p [facility].     She recovered well from procedure. No urinary symptoms at this time.     I personally reviewed her pathology which showed no bladder malignancy. We will continue to monitor with cystoscopy, next in July 2024. BCG not recommended at this time. She agrees with plan.     Plan:  Cystoscopy in July 2024.   Diagnoses and all orders for this visit:  Malignant neoplasm of urinary bladder, unspecified site (CMS/HCC)      Scribe Attestation  By signing my name below, I, Ryan Pinto attest that this documentation has been prepared under the direction and in the presence of Stefany Phipps MD.

## 2024-05-14 ENCOUNTER — OFFICE VISIT (OUTPATIENT)
Dept: PRIMARY CARE | Facility: CLINIC | Age: 74
End: 2024-05-14
Payer: MEDICARE

## 2024-05-14 ENCOUNTER — HOSPITAL ENCOUNTER (OUTPATIENT)
Dept: RADIOLOGY | Facility: CLINIC | Age: 74
Discharge: HOME | End: 2024-05-14
Payer: MEDICARE

## 2024-05-14 ENCOUNTER — LAB (OUTPATIENT)
Dept: LAB | Facility: LAB | Age: 74
End: 2024-05-14
Payer: MEDICARE

## 2024-05-14 VITALS
WEIGHT: 200 LBS | BODY MASS INDEX: 34.15 KG/M2 | SYSTOLIC BLOOD PRESSURE: 127 MMHG | OXYGEN SATURATION: 93 % | HEART RATE: 78 BPM | HEIGHT: 64 IN | DIASTOLIC BLOOD PRESSURE: 84 MMHG

## 2024-05-14 DIAGNOSIS — E55.9 VITAMIN D DEFICIENCY: ICD-10-CM

## 2024-05-14 DIAGNOSIS — Z00.00 MEDICARE ANNUAL WELLNESS VISIT, SUBSEQUENT: ICD-10-CM

## 2024-05-14 DIAGNOSIS — Z00.00 MEDICARE ANNUAL WELLNESS VISIT, SUBSEQUENT: Primary | ICD-10-CM

## 2024-05-14 DIAGNOSIS — R60.0 LOCALIZED EDEMA: ICD-10-CM

## 2024-05-14 DIAGNOSIS — R53.82 CHRONIC FATIGUE: ICD-10-CM

## 2024-05-14 DIAGNOSIS — G89.29 CHRONIC PAIN OF BOTH KNEES: ICD-10-CM

## 2024-05-14 DIAGNOSIS — E03.9 ACQUIRED HYPOTHYROIDISM: ICD-10-CM

## 2024-05-14 DIAGNOSIS — Z12.31 ENCOUNTER FOR SCREENING MAMMOGRAM FOR BREAST CANCER: ICD-10-CM

## 2024-05-14 DIAGNOSIS — M25.562 CHRONIC PAIN OF BOTH KNEES: ICD-10-CM

## 2024-05-14 DIAGNOSIS — E11.9 CONTROLLED TYPE 2 DIABETES MELLITUS WITHOUT COMPLICATION, WITHOUT LONG-TERM CURRENT USE OF INSULIN (MULTI): ICD-10-CM

## 2024-05-14 DIAGNOSIS — E53.8 VITAMIN B12 DEFICIENCY (NON ANEMIC): ICD-10-CM

## 2024-05-14 DIAGNOSIS — M25.561 CHRONIC PAIN OF BOTH KNEES: ICD-10-CM

## 2024-05-14 DIAGNOSIS — Z00.00 ROUTINE GENERAL MEDICAL EXAMINATION AT HEALTH CARE FACILITY: ICD-10-CM

## 2024-05-14 LAB
ALBUMIN SERPL BCP-MCNC: 4.4 G/DL (ref 3.4–5)
ALP SERPL-CCNC: 40 U/L (ref 33–136)
ALT SERPL W P-5'-P-CCNC: 14 U/L (ref 7–45)
ANION GAP SERPL CALC-SCNC: 12 MMOL/L (ref 10–20)
AST SERPL W P-5'-P-CCNC: 14 U/L (ref 9–39)
BILIRUB SERPL-MCNC: 1 MG/DL (ref 0–1.2)
BUN SERPL-MCNC: 21 MG/DL (ref 6–23)
CALCIUM SERPL-MCNC: 10.7 MG/DL (ref 8.6–10.3)
CHLORIDE SERPL-SCNC: 108 MMOL/L (ref 98–107)
CO2 SERPL-SCNC: 24 MMOL/L (ref 21–32)
CREAT SERPL-MCNC: 0.88 MG/DL (ref 0.5–1.05)
EGFRCR SERPLBLD CKD-EPI 2021: 69 ML/MIN/1.73M*2
EST. AVERAGE GLUCOSE BLD GHB EST-MCNC: 126 MG/DL
GLUCOSE SERPL-MCNC: 134 MG/DL (ref 74–99)
HBA1C MFR BLD: 6 %
MAGNESIUM SERPL-MCNC: 1.57 MG/DL (ref 1.6–2.4)
POTASSIUM SERPL-SCNC: 4.2 MMOL/L (ref 3.5–5.3)
PROT SERPL-MCNC: 6.4 G/DL (ref 6.4–8.2)
SODIUM SERPL-SCNC: 140 MMOL/L (ref 136–145)
T3FREE SERPL-MCNC: 3.3 PG/ML (ref 2.3–4.2)
T4 FREE SERPL-MCNC: 1.45 NG/DL (ref 0.61–1.12)
TSH SERPL-ACNC: 0.17 MIU/L (ref 0.44–3.98)
VIT B12 SERPL-MCNC: 338 PG/ML (ref 211–911)

## 2024-05-14 PROCEDURE — 3074F SYST BP LT 130 MM HG: CPT | Performed by: NURSE PRACTITIONER

## 2024-05-14 PROCEDURE — 73562 X-RAY EXAM OF KNEE 3: CPT | Mod: RIGHT SIDE | Performed by: RADIOLOGY

## 2024-05-14 PROCEDURE — 84481 FREE ASSAY (FT-3): CPT

## 2024-05-14 PROCEDURE — 3079F DIAST BP 80-89 MM HG: CPT | Performed by: NURSE PRACTITIONER

## 2024-05-14 PROCEDURE — 84443 ASSAY THYROID STIM HORMONE: CPT

## 2024-05-14 PROCEDURE — 84439 ASSAY OF FREE THYROXINE: CPT

## 2024-05-14 PROCEDURE — 1036F TOBACCO NON-USER: CPT | Performed by: NURSE PRACTITIONER

## 2024-05-14 PROCEDURE — 82607 VITAMIN B-12: CPT

## 2024-05-14 PROCEDURE — 1158F ADVNC CARE PLAN TLK DOCD: CPT | Performed by: NURSE PRACTITIONER

## 2024-05-14 PROCEDURE — 36415 COLL VENOUS BLD VENIPUNCTURE: CPT

## 2024-05-14 PROCEDURE — 99213 OFFICE O/P EST LOW 20 MIN: CPT | Performed by: NURSE PRACTITIONER

## 2024-05-14 PROCEDURE — 1123F ACP DISCUSS/DSCN MKR DOCD: CPT | Performed by: NURSE PRACTITIONER

## 2024-05-14 PROCEDURE — 83036 HEMOGLOBIN GLYCOSYLATED A1C: CPT

## 2024-05-14 PROCEDURE — 73562 X-RAY EXAM OF KNEE 3: CPT | Mod: RT

## 2024-05-14 PROCEDURE — 80053 COMPREHEN METABOLIC PANEL: CPT

## 2024-05-14 PROCEDURE — 82306 VITAMIN D 25 HYDROXY: CPT

## 2024-05-14 PROCEDURE — 1159F MED LIST DOCD IN RCRD: CPT | Performed by: NURSE PRACTITIONER

## 2024-05-14 PROCEDURE — 83735 ASSAY OF MAGNESIUM: CPT

## 2024-05-14 PROCEDURE — 1160F RVW MEDS BY RX/DR IN RCRD: CPT | Performed by: NURSE PRACTITIONER

## 2024-05-14 PROCEDURE — 1157F ADVNC CARE PLAN IN RCRD: CPT | Performed by: NURSE PRACTITIONER

## 2024-05-14 PROCEDURE — 73600 X-RAY EXAM OF ANKLE: CPT | Mod: LT

## 2024-05-14 PROCEDURE — 1170F FXNL STATUS ASSESSED: CPT | Performed by: NURSE PRACTITIONER

## 2024-05-14 PROCEDURE — G0439 PPPS, SUBSEQ VISIT: HCPCS | Performed by: NURSE PRACTITIONER

## 2024-05-14 RX ORDER — ROSUVASTATIN CALCIUM 20 MG/1
20 TABLET, COATED ORAL DAILY
COMMUNITY
Start: 2024-05-04

## 2024-05-14 ASSESSMENT — ACTIVITIES OF DAILY LIVING (ADL)
GROCERY_SHOPPING: INDEPENDENT
TAKING_MEDICATION: INDEPENDENT
DRESSING: INDEPENDENT
BATHING: INDEPENDENT
MANAGING_FINANCES: INDEPENDENT
DOING_HOUSEWORK: INDEPENDENT

## 2024-05-14 ASSESSMENT — PATIENT HEALTH QUESTIONNAIRE - PHQ9
2. FEELING DOWN, DEPRESSED OR HOPELESS: NOT AT ALL
1. LITTLE INTEREST OR PLEASURE IN DOING THINGS: MORE THAN HALF THE DAYS
1. LITTLE INTEREST OR PLEASURE IN DOING THINGS: MORE THAN HALF THE DAYS
SUM OF ALL RESPONSES TO PHQ9 QUESTIONS 1 AND 2: 2

## 2024-05-14 ASSESSMENT — ENCOUNTER SYMPTOMS
LOSS OF SENSATION IN FEET: 1
DEPRESSION: 0
OCCASIONAL FEELINGS OF UNSTEADINESS: 1

## 2024-05-14 NOTE — PROGRESS NOTES
"Subjective   Patient ID: Latosha Larson is a 74 y.o. female who presents for Medicare Annual Wellness Visit Subsequent (Patient is here today for her annual medicare appt. Patient states in the last 18 months she has been unmotivated and fatigue. Patient has realized a lot more edema in her legs.).    74 year old female here for annual Medicare Wellness.   Concerned with her memory- diff time finding words. Forgets things easily. MMSE completed in office she did very well. Her score was 29/30  Asking for ortho referral for bilateral knee pain. States saw Rheum Dr Mast in the past with injections. She wants updated Xrays.   Has Bilateral LE edema. This has occurred in the past. She states it comes and goes. Does not like to take medications unless necessary.   Fasting for labs    Prevention:  Breast Ca screen: 8/2021  Colon Ca Screen:  Lung Ca Screen: 10/2021  DEXA: 10/2023 Osteopenia on Vitamin D. HX elevated Ca+  CT Cardiac Score: none; on statin.   Diabetes Screen: HX DM2 on MF 1000mg po BID. Last A1C 7/2023 was 6.0%  Opthal: yearly, no acute concerns.   Dental: routine. No acute concerns    Lives alone, drives.   No nicotene  No ETOH  HX depression- does not liek to take medications  HX Hypothyroid on Cytomel and levothyroxine. States she has been taking these daily since last OV  I reviewed her meds. She could not state doses but knew the names and what her meds are for            Review of Systems    Objective   /84   Pulse 78   Ht 1.626 m (5' 4\")   Wt 90.7 kg (200 lb)   LMP  (LMP Unknown)   SpO2 93%   BMI 34.33 kg/m²     Physical Exam  Vitals and nursing note reviewed.   Constitutional:       General: She is not in acute distress.     Appearance: Normal appearance. She is obese.   HENT:      Head: Normocephalic and atraumatic.      Right Ear: External ear normal.      Left Ear: External ear normal.      Nose: Nose normal.      Mouth/Throat:      Mouth: Mucous membranes are moist.      Pharynx: " Oropharynx is clear.   Eyes:      Extraocular Movements: Extraocular movements intact.      Conjunctiva/sclera: Conjunctivae normal.      Pupils: Pupils are equal, round, and reactive to light.   Neck:      Vascular: No carotid bruit.   Cardiovascular:      Rate and Rhythm: Normal rate and regular rhythm.      Pulses: Normal pulses.      Heart sounds: Normal heart sounds.   Pulmonary:      Effort: Pulmonary effort is normal.      Breath sounds: Normal breath sounds.   Abdominal:      General: Bowel sounds are normal. There is no distension.      Palpations: Abdomen is soft.      Tenderness: There is no abdominal tenderness.   Musculoskeletal:         General: Normal range of motion.      Cervical back: Normal range of motion and neck supple.      Right lower leg: No edema.      Left lower leg: No edema.   Lymphadenopathy:      Cervical: No cervical adenopathy.   Skin:     General: Skin is warm and dry.      Capillary Refill: Capillary refill takes less than 2 seconds.   Neurological:      General: No focal deficit present.      Mental Status: She is alert and oriented to person, place, and time. Mental status is at baseline.      Motor: No weakness.   Psychiatric:         Mood and Affect: Mood normal.         Behavior: Behavior normal.         Thought Content: Thought content normal.         Judgment: Judgment normal.      Comments: Flat affect         Assessment/Plan   Diagnoses and all orders for this visit:  Medicare annual wellness visit, subsequent  -     Comprehensive Metabolic Panel; Future  Localized edema  Comments:  Could try adding hydrochlorothiazide.  Update blood work.  Orders:  -     Magnesium; Future  Acquired hypothyroidism  Comments:  Update TSH, free T3 and free T4.  Adjust medications as appropriate.  She may still be hypothyroid causing lower extremity edema  Orders:  -     Thyroid Stimulating Hormone; Future  -     Thyroxine, Free; Future  -     Triiodothyronine, Free; Future  Vitamin B12  deficiency (non anemic)  Comments:  She has been on a B12 supplement with history of deficiency she is asking for this to be rechecked  Orders:  -     Vitamin B12; Future  Chronic fatigue  Comments:  Many possible causes such as hypothyroid, depression  Orders:  -     Vitamin D 25-Hydroxy,Total (for eval of Vitamin D levels); Future  -     Magnesium; Future  Controlled type 2 diabetes mellitus without complication, without long-term current use of insulin (Multi)  Comments:  Update hemoglobin A1c she remains on metformin 1000 mg p.o. twice daily she does not know if she needs refills  Orders:  -     Hemoglobin A1C; Future  Encounter for screening mammogram for breast cancer  Comments:  Has not had a mammogram since 2021 and she is agreeable to getting one updated.  Orders:  -     BI mammo bilateral screening tomosynthesis; Future  Routine general medical examination at health care facility  -     1 Year Follow Up In Primary Care - Wellness Exam; Future  Chronic pain of both knees  Comments:  X-ray .  Ortho referral per patient request  Orders:  -     XR knee right 3 views; Future  -     XR ankle left 2 views; Future  -     Referral to Orthopaedic Surgery; Future  Vitamin D deficiency  Comments:  Elevated calcium level.  Osteopenia.  Update vitamin D level since she is on supplement  Orders:  -     Vitamin D 25-Hydroxy,Total (for eval of Vitamin D levels); Future  Other orders  -     Follow Up In Primary Care - Health Maintenance  -     Follow Up In Primary Care - Established; Future       Follow-up in 2 days to review all diagnostic test results.    MMSE is okay she may benefit from a neurology eval but I do think that some of her memory is related to depression this has been discussed with patient during previous visits.  She does not want to take medications.  She also lives alone and I do not think she is very interactive with others.    It is possible that her thyroid is still abnormal causing her to have  increased depression, edema and difficulty with memory

## 2024-05-15 LAB — 25(OH)D3 SERPL-MCNC: 42 NG/ML (ref 30–100)

## 2024-05-16 ENCOUNTER — OFFICE VISIT (OUTPATIENT)
Dept: PRIMARY CARE | Facility: CLINIC | Age: 74
End: 2024-05-16
Payer: MEDICARE

## 2024-05-16 VITALS
BODY MASS INDEX: 34.66 KG/M2 | SYSTOLIC BLOOD PRESSURE: 145 MMHG | HEART RATE: 104 BPM | WEIGHT: 203 LBS | OXYGEN SATURATION: 97 % | DIASTOLIC BLOOD PRESSURE: 77 MMHG | HEIGHT: 64 IN

## 2024-05-16 DIAGNOSIS — E83.42 HYPOMAGNESEMIA: ICD-10-CM

## 2024-05-16 DIAGNOSIS — E03.9 ACQUIRED HYPOTHYROIDISM: ICD-10-CM

## 2024-05-16 DIAGNOSIS — E11.9 TYPE 2 DIABETES MELLITUS WITHOUT COMPLICATION, WITHOUT LONG-TERM CURRENT USE OF INSULIN (MULTI): Primary | ICD-10-CM

## 2024-05-16 DIAGNOSIS — E53.8 VITAMIN B12 DEFICIENCY (NON ANEMIC): ICD-10-CM

## 2024-05-16 DIAGNOSIS — E55.9 VITAMIN D DEFICIENCY: ICD-10-CM

## 2024-05-16 PROCEDURE — 3077F SYST BP >= 140 MM HG: CPT | Performed by: NURSE PRACTITIONER

## 2024-05-16 PROCEDURE — 3078F DIAST BP <80 MM HG: CPT | Performed by: NURSE PRACTITIONER

## 2024-05-16 PROCEDURE — 1158F ADVNC CARE PLAN TLK DOCD: CPT | Performed by: NURSE PRACTITIONER

## 2024-05-16 PROCEDURE — 1036F TOBACCO NON-USER: CPT | Performed by: NURSE PRACTITIONER

## 2024-05-16 PROCEDURE — 99214 OFFICE O/P EST MOD 30 MIN: CPT | Performed by: NURSE PRACTITIONER

## 2024-05-16 PROCEDURE — 1159F MED LIST DOCD IN RCRD: CPT | Performed by: NURSE PRACTITIONER

## 2024-05-16 PROCEDURE — 1160F RVW MEDS BY RX/DR IN RCRD: CPT | Performed by: NURSE PRACTITIONER

## 2024-05-16 PROCEDURE — 1123F ACP DISCUSS/DSCN MKR DOCD: CPT | Performed by: NURSE PRACTITIONER

## 2024-05-16 PROCEDURE — 3044F HG A1C LEVEL LT 7.0%: CPT | Performed by: NURSE PRACTITIONER

## 2024-05-16 PROCEDURE — 1157F ADVNC CARE PLAN IN RCRD: CPT | Performed by: NURSE PRACTITIONER

## 2024-05-16 NOTE — PATIENT INSTRUCTIONS
Take the levothyroxine only 5 days a week   B12 1000mcg a day under the tongue  Vitamin D 3 - 5,000 units a day with food  Magnesium deficiency- magnesium glycinate 100-200 mg at bedtime

## 2024-05-16 NOTE — PROGRESS NOTES
"Subjective   Patient ID: Latosha Larson is a 74 y.o. female who presents for Results (Patient is here today for lab results and  to review medications. ).    74-year-old female here to review recent blood work.    While she was here she brought up concerns that she has brought up to me several times.    Chronic anxiety/depression. Has tried several meds in the past and \"they did not work\". Does not want to try again. Declining neurology eval for her \"word finding\" trouble.   Chronic poor sleep. States it has been suggested to have an eval for the sleep clinic but she has not wanted to do so.  When sleeping flat will wake with her mouth open and or drooling. When sleeping in a reclining chair sleeps well.  Declines referral to sleep clinic  DM2- A1C 6.0%. MF 1000mg po BID  Hypothyroid- FT3 is good, FT4 high, TSH low on levothyroxine 6 days a week, T3 every day  B12 is low  Vitamin DF in the 40's  Mag 1.5         Review of Systems    Objective   /77   Pulse 104   Ht 1.626 m (5' 4\")   Wt 92.1 kg (203 lb)   LMP  (LMP Unknown)   SpO2 97%   BMI 34.84 kg/m²     Physical Exam  Constitutional:       Appearance: Normal appearance. She is obese.   Neurological:      General: No focal deficit present.      Mental Status: She is alert and oriented to person, place, and time. Mental status is at baseline.   Psychiatric:         Mood and Affect: Mood normal.         Behavior: Behavior normal.         Thought Content: Thought content normal.         Assessment/Plan   Diagnoses and all orders for this visit:  Type 2 diabetes mellitus without complication, without long-term current use of insulin (Multi)  Comments:  Hemoglobin A1c looks very good on metformin 1000 mg p.o. twice daily  Acquired hypothyroidism  Comments:  Hyperthyroid and blood work.  Decrease levothyroxine to 1 tab p.o. 5 days a week.  Vitamin B12 deficiency (non anemic)  Comments:  Add sublingual B12 1000 mcg daily  Vitamin D deficiency  Comments:  Add " vitamin D 50,000 units p.o. daily  Hypomagnesemia  Comments:  Magnesium glycinate 100 to 200 mg p.o. nightly this can also help with sleep and anxiety  Other orders  -     Follow Up In Primary Care - Established  -     Follow Up In Primary Care - Established; Future

## 2024-05-24 ENCOUNTER — HOSPITAL ENCOUNTER (OUTPATIENT)
Dept: RADIOLOGY | Facility: CLINIC | Age: 74
Discharge: HOME | End: 2024-05-24
Payer: MEDICARE

## 2024-05-24 VITALS — BODY MASS INDEX: 34.15 KG/M2 | WEIGHT: 200 LBS | HEIGHT: 64 IN

## 2024-05-24 DIAGNOSIS — Z12.31 ENCOUNTER FOR SCREENING MAMMOGRAM FOR BREAST CANCER: ICD-10-CM

## 2024-05-24 PROCEDURE — 77063 BREAST TOMOSYNTHESIS BI: CPT | Performed by: RADIOLOGY

## 2024-05-24 PROCEDURE — 77067 SCR MAMMO BI INCL CAD: CPT | Performed by: RADIOLOGY

## 2024-05-24 PROCEDURE — 77067 SCR MAMMO BI INCL CAD: CPT

## 2024-06-03 ENCOUNTER — TELEPHONE (OUTPATIENT)
Dept: PRIMARY CARE | Facility: CLINIC | Age: 74
End: 2024-06-03
Payer: MEDICARE

## 2024-06-11 ENCOUNTER — APPOINTMENT (OUTPATIENT)
Dept: ORTHOPEDIC SURGERY | Facility: HOSPITAL | Age: 74
End: 2024-06-11
Payer: MEDICARE

## 2024-06-11 ENCOUNTER — OFFICE VISIT (OUTPATIENT)
Dept: RHEUMATOLOGY | Facility: CLINIC | Age: 74
End: 2024-06-11
Payer: MEDICARE

## 2024-06-11 VITALS
HEART RATE: 91 BPM | SYSTOLIC BLOOD PRESSURE: 122 MMHG | BODY MASS INDEX: 33.63 KG/M2 | OXYGEN SATURATION: 97 % | DIASTOLIC BLOOD PRESSURE: 70 MMHG | HEIGHT: 64 IN | WEIGHT: 197 LBS

## 2024-06-11 DIAGNOSIS — M81.0 AGE-RELATED OSTEOPOROSIS WITHOUT CURRENT PATHOLOGICAL FRACTURE: ICD-10-CM

## 2024-06-11 DIAGNOSIS — M17.0 PRIMARY OSTEOARTHRITIS OF KNEES, BILATERAL: Primary | ICD-10-CM

## 2024-06-11 DIAGNOSIS — M25.562 LEFT KNEE PAIN, UNSPECIFIED CHRONICITY: ICD-10-CM

## 2024-06-11 PROCEDURE — 3044F HG A1C LEVEL LT 7.0%: CPT | Performed by: INTERNAL MEDICINE

## 2024-06-11 PROCEDURE — 1036F TOBACCO NON-USER: CPT | Performed by: INTERNAL MEDICINE

## 2024-06-11 PROCEDURE — 1157F ADVNC CARE PLAN IN RCRD: CPT | Performed by: INTERNAL MEDICINE

## 2024-06-11 PROCEDURE — 1159F MED LIST DOCD IN RCRD: CPT | Performed by: INTERNAL MEDICINE

## 2024-06-11 PROCEDURE — 3078F DIAST BP <80 MM HG: CPT | Performed by: INTERNAL MEDICINE

## 2024-06-11 PROCEDURE — 2500000004 HC RX 250 GENERAL PHARMACY W/ HCPCS (ALT 636 FOR OP/ED): Performed by: INTERNAL MEDICINE

## 2024-06-11 PROCEDURE — 3074F SYST BP LT 130 MM HG: CPT | Performed by: INTERNAL MEDICINE

## 2024-06-11 PROCEDURE — 96372 THER/PROPH/DIAG INJ SC/IM: CPT | Performed by: INTERNAL MEDICINE

## 2024-06-11 PROCEDURE — 1160F RVW MEDS BY RX/DR IN RCRD: CPT | Performed by: INTERNAL MEDICINE

## 2024-06-11 PROCEDURE — 2500000005 HC RX 250 GENERAL PHARMACY W/O HCPCS: Performed by: INTERNAL MEDICINE

## 2024-06-11 PROCEDURE — 99214 OFFICE O/P EST MOD 30 MIN: CPT | Performed by: INTERNAL MEDICINE

## 2024-06-11 PROCEDURE — 1123F ACP DISCUSS/DSCN MKR DOCD: CPT | Performed by: INTERNAL MEDICINE

## 2024-06-11 PROCEDURE — 1125F AMNT PAIN NOTED PAIN PRSNT: CPT | Performed by: INTERNAL MEDICINE

## 2024-06-11 RX ORDER — BUPIVACAINE HYDROCHLORIDE 5 MG/ML
4 INJECTION, SOLUTION PERINEURAL ONCE
Status: COMPLETED | OUTPATIENT
Start: 2024-06-11 | End: 2024-06-11

## 2024-06-11 RX ORDER — METHYLPREDNISOLONE ACETATE 40 MG/ML
40 INJECTION, SUSPENSION INTRA-ARTICULAR; INTRALESIONAL; INTRAMUSCULAR; SOFT TISSUE ONCE
Status: COMPLETED | OUTPATIENT
Start: 2024-06-11 | End: 2024-06-11

## 2024-06-11 RX ADMIN — BUPIVACAINE HYDROCHLORIDE 20 MG: 5 INJECTION, SOLUTION PERINEURAL at 16:22

## 2024-06-11 RX ADMIN — METHYLPREDNISOLONE ACETATE 40 MG: 40 INJECTION, SUSPENSION INTRALESIONAL; INTRAMUSCULAR; INTRASYNOVIAL; SOFT TISSUE at 16:25

## 2024-06-11 ASSESSMENT — ROUTINE ASSESSMENT OF PATIENT INDEX DATA (RAPID3)
GOOD_NIGHTS_SLEEP: WITH MUCH DIFFICULTY
FEELINGS_ANXIETY_NERVOUS: WITH SOME DIFFICULTY
WASH_DRY_BODY: WITHOUT ANY DIFFICULTY
TURN_FAUCETS_OFF: WITHOUT ANY DIFFICULTY
WEIGHTED_TOTAL_SCORE: 4.43
LIFT_CUP_TO_MOUTH: WITHOUT ANY DIFFICULTY
TOTAL RAPID3 SCORE: 13.3
FN_SCORE: 2.3
DRESS_YOURSELF: WITH SOME DIFFICULTY
WALK_FLAT_GROUND: WITHOUT ANY DIFFICULTY
SEVERITY_SCORE: HIGH SEVERITY (HS)
SUM OF QUESTIONS A TO J: 7
PICK_CLOTHES_OFF_FLOOR: WITHOUT ANY DIFFICULTY
ON A SCALE OF ONE TO TEN, CONSIDERING ALL THE WAYS IN WHICH ILLNESS AND HEALTH CONDITIONS MAY AFFECT YOU AT THIS TIME, PLEASE INDICATE BELOW HOW YOU ARE DOING:: 4
IN_OUT_BED: WITHOUT ANY DIFFICULTY
ON A SCALE OF ONE TO TEN, HOW MUCH PAIN HAVE YOU HAD BECAUSE OF YOUR CONDITION OVER THE PAST WEEK?: 7
ON A SCALE OF ONE TO TEN, CONSIDERING ALL THE WAYS IN WHICH ILLNESS AND HEALTH CONDITIONS MAY AFFECT YOU AT THIS TIME, PLEASE INDICATE BELOW HOW YOU ARE DOING:: 4
FEELINGS_DEPRESSION: WITH SOME DIFFICULTY
WALK_KILOMETERS: WITH MUCH DIFFICULTY
ON A SCALE OF ONE TO TEN, HOW MUCH PAIN HAVE YOU HAD BECAUSE OF YOUR CONDITION OVER THE PAST WEEK?: 7
SEVERITY_SCORE: 0
PARTIPATE_RECREATIONAL_ACTIVITIES: UNABLE TO DO
IN_OUT_TRANSPORT: WITH SOME DIFFICULTY

## 2024-06-11 ASSESSMENT — PATIENT HEALTH QUESTIONNAIRE - PHQ9
SUM OF ALL RESPONSES TO PHQ9 QUESTIONS 1 AND 2: 0
2. FEELING DOWN, DEPRESSED OR HOPELESS: NOT AT ALL
1. LITTLE INTEREST OR PLEASURE IN DOING THINGS: NOT AT ALL

## 2024-06-11 ASSESSMENT — ENCOUNTER SYMPTOMS
DEPRESSION: 0
JOINT SWELLING: 1
BACK PAIN: 1
ARTHRALGIAS: 1
LOSS OF SENSATION IN FEET: 0
OCCASIONAL FEELINGS OF UNSTEADINESS: 1

## 2024-06-11 ASSESSMENT — PAIN SCALES - GENERAL: PAINLEVEL: 1

## 2024-06-11 NOTE — PROGRESS NOTES
"Chief Complaint:    This patient is a 74 y.o. female presents with bilateral knee pain of several years duration.     Subjective:   HPI   Problem:  1: Bilateral  knee pain       The patient presents with a several year history of bilateral knee pain.       At a previous office visit on 4/21/22, the patient reported not only back pain, her principal reason for the visit, but also mentioned chronic left knee pain secondary to osteoarthritis (OA). She did not return for follow-up of that problem.         Today, the patient reports bilateral knee pain, but emphasizes the left knee. On average, her knee pain bothers her primarily nocturnally and awakens her. She remarks that the pain is sharp, intense, often ~6-7/10. She estimates that the knee has awakened her three times this past week.        By contrast, the right knee \"bothers me when I walk, step up on a curb or stand up.\" Pain is not necessarily daily. On average, the pain is also ~6-7/10 at its worst. Pain is very variable.         The patient denies any audible crepitus. Once or twice the right knee has \"somewhat given out on me, but I caught myself and I didn't go down. This was about a year ago.\"        The patient can walk stairs, very slowly. She goes up \"one step at a time and I have to hold onto the bannisters to pull myself up.\"    Review of Systems   Musculoskeletal:  Positive for arthralgias, back pain, gait problem and joint swelling.        There is no audible crepitus. Patient has fallen and fractured the left ankle in 9/2022.     Objective:   /70   Pulse 91   Ht 1.626 m (5' 4\")   Wt 89.4 kg (197 lb)   LMP  (LMP Unknown)   SpO2 97%   BMI 33.81 kg/m²      Physical Exam  Musculoskeletal:      Right knee: No swelling, deformity, effusion, erythema or crepitus. Decreased range of motion. Tenderness present over the medial joint line. No lateral joint line tenderness.      Instability Tests: Anterior drawer test negative. Posterior drawer test " negative. Anterior Lachman test negative. Medial Cait test positive.      Left knee: No swelling, deformity, effusion, erythema or crepitus. Decreased range of motion. Tenderness present over the medial joint line. No lateral joint line tenderness.      Instability Tests: Anterior drawer test negative. Posterior drawer test negative. Anterior Lachman test negative. Medial Cait test negative.        Legs:       Comments: Arrows point to the right and left knees. The patient's pain is principally in the anterior knee, less along the medial joint lines. No Baker's cysts are identified.          Assessment:   Primary osteoarthritis of knees - M17.0  Age-associated osteoporosis - M81.0    Plan:    PROCEDURE: The left knee was placed in flexion. The site of injection was identified and marked. The skin was cleansed with POVIDONE + ALCOHOL, topically anesthetized with ETHYL CHLORIDE, and then the skin and subcutaneous tissue was anesthetized with 2 mL of 0.5% BUPIVACAINE. Subsequently, the knee was injected with DEPOMEDROL 40 mg + 4 mL of 0.5% BUPIVACAINE without difficulty. The patient then walked the floor with a stable gait and improved pain. No difficulties were encountered during or after the injections.   2.   Xrays were reviewed with the patient of both knees. Moderately advanced, stage 3-4 OA was identified. Possible chondrocalcinosis was seen in the left knee. For the patient's benefit because of the left ankle fracture, I also reviewed the DXA scan performed in 2023. The scan showed multiple vertebral deformities consistent with fractures. In particular, at T12 a biconcave fracture is identified. Thus, the patient has a definite diagnosis of osteoporosis, and should consider treatment for severe osteoporosis. 3.    3.   Return to office in 3 weeks to discuss treatment options for osteoporosis.  4.   A level 4 visit is charged based on time spent, review of old records, obtaining new history, performing a  limited locomotor exam, performing a knee injection, discussing a new problem (osteoporosis) and reviewing the DXA scan with the patient, and responding to queries.    Savage Sullivan MD

## 2024-06-13 ENCOUNTER — OFFICE VISIT (OUTPATIENT)
Dept: CARDIOLOGY | Facility: CLINIC | Age: 74
End: 2024-06-13
Payer: MEDICARE

## 2024-06-13 ENCOUNTER — LAB (OUTPATIENT)
Dept: LAB | Facility: LAB | Age: 74
End: 2024-06-13
Payer: MEDICARE

## 2024-06-13 VITALS
WEIGHT: 191 LBS | OXYGEN SATURATION: 95 % | SYSTOLIC BLOOD PRESSURE: 130 MMHG | HEART RATE: 88 BPM | DIASTOLIC BLOOD PRESSURE: 81 MMHG | BODY MASS INDEX: 32.79 KG/M2

## 2024-06-13 DIAGNOSIS — I87.2 CHRONIC VENOUS INSUFFICIENCY: Primary | ICD-10-CM

## 2024-06-13 DIAGNOSIS — I87.1 MAY-THURNER SYNDROME: ICD-10-CM

## 2024-06-13 DIAGNOSIS — M81.0 AGE-RELATED OSTEOPOROSIS WITHOUT CURRENT PATHOLOGICAL FRACTURE: ICD-10-CM

## 2024-06-13 LAB
ALBUMIN SERPL BCP-MCNC: 4.7 G/DL (ref 3.4–5)
ALP SERPL-CCNC: 51 U/L (ref 33–136)
ALT SERPL W P-5'-P-CCNC: 15 U/L (ref 7–45)
ANION GAP SERPL CALC-SCNC: 13 MMOL/L (ref 10–20)
AST SERPL W P-5'-P-CCNC: 13 U/L (ref 9–39)
BILIRUB SERPL-MCNC: 0.6 MG/DL (ref 0–1.2)
BUN SERPL-MCNC: 25 MG/DL (ref 6–23)
CALCIUM SERPL-MCNC: 10.9 MG/DL (ref 8.6–10.6)
CHLORIDE SERPL-SCNC: 108 MMOL/L (ref 98–107)
CO2 SERPL-SCNC: 23 MMOL/L (ref 21–32)
CREAT SERPL-MCNC: 0.98 MG/DL (ref 0.5–1.05)
EGFRCR SERPLBLD CKD-EPI 2021: 61 ML/MIN/1.73M*2
GLUCOSE SERPL-MCNC: 119 MG/DL (ref 74–99)
MAGNESIUM SERPL-MCNC: 2.02 MG/DL (ref 1.6–2.4)
PHOSPHATE SERPL-MCNC: 2.7 MG/DL (ref 2.5–4.9)
POTASSIUM SERPL-SCNC: 4.7 MMOL/L (ref 3.5–5.3)
PROT SERPL-MCNC: 6.6 G/DL (ref 6.4–8.2)
PTH-INTACT SERPL-MCNC: 188.4 PG/ML (ref 18.5–88)
SODIUM SERPL-SCNC: 139 MMOL/L (ref 136–145)

## 2024-06-13 PROCEDURE — 84100 ASSAY OF PHOSPHORUS: CPT

## 2024-06-13 PROCEDURE — 83735 ASSAY OF MAGNESIUM: CPT

## 2024-06-13 PROCEDURE — 3044F HG A1C LEVEL LT 7.0%: CPT | Performed by: INTERNAL MEDICINE

## 2024-06-13 PROCEDURE — 36415 COLL VENOUS BLD VENIPUNCTURE: CPT

## 2024-06-13 PROCEDURE — 1160F RVW MEDS BY RX/DR IN RCRD: CPT | Performed by: INTERNAL MEDICINE

## 2024-06-13 PROCEDURE — 1159F MED LIST DOCD IN RCRD: CPT | Performed by: INTERNAL MEDICINE

## 2024-06-13 PROCEDURE — 3075F SYST BP GE 130 - 139MM HG: CPT | Performed by: INTERNAL MEDICINE

## 2024-06-13 PROCEDURE — 1123F ACP DISCUSS/DSCN MKR DOCD: CPT | Performed by: INTERNAL MEDICINE

## 2024-06-13 PROCEDURE — 3079F DIAST BP 80-89 MM HG: CPT | Performed by: INTERNAL MEDICINE

## 2024-06-13 PROCEDURE — 83970 ASSAY OF PARATHORMONE: CPT

## 2024-06-13 PROCEDURE — 1157F ADVNC CARE PLAN IN RCRD: CPT | Performed by: INTERNAL MEDICINE

## 2024-06-13 PROCEDURE — 99213 OFFICE O/P EST LOW 20 MIN: CPT | Performed by: INTERNAL MEDICINE

## 2024-06-13 PROCEDURE — 80053 COMPREHEN METABOLIC PANEL: CPT

## 2024-06-13 PROCEDURE — 82523 COLLAGEN CROSSLINKS: CPT

## 2024-06-13 RX ORDER — APIXABAN 5 MG/1
5 TABLET, FILM COATED ORAL 2 TIMES DAILY
Qty: 60 TABLET | Refills: 11 | Status: SHIPPED | OUTPATIENT
Start: 2024-06-13

## 2024-06-13 NOTE — PATIENT INSTRUCTIONS
I want you to continue the Eliquis as you've been doing.    You can get compression socks at [employer] or any other store that sells durable medical goods.     Once you have decided where you are going to go for socks, call in advance and find out when the person who measures you for socks will be there. Plan to go fairly early in the day. Many people have swelling that is worse toward the end of the day. If you are measured at the end of the day, you may not get a good fit.    You should put on your compression socks first thing in the morning. Take them off before bed.    If you use lotion or moisturizer on your legs, do NOT put lotion or moisturizer on immediately before you put on your socks, as it will make them difficult to pull up. Use your lotion or moisturizer at night.    Compression socks can be hand-washed or washed in a mesh bag in the washing machine. Air dry them. Do NOT put them in the dryer.    If you would like to see a cardiologist in [place], you can call the [facility] scheduling line at [phone].

## 2024-06-13 NOTE — PROGRESS NOTES
No chief complaint on file.      History of Present Illness:  Latosha Larson is a/an 74 y.o. with history of left leg DVT; May-Thurner syndrome [facility] bleed. Had IVC filter, which was ultimately retrieved.    Notices bilateral ankle swelling, worse toward the end of the day, better first thing in the morning.    She continues on anticoagulation with apixban 2.5 mg bid (long-term secondary prevention dose).     She denies bleeding including epistaxis, gingival bleeding, hemoptysis, hematemesis, hematochezia, melena, hematuria, and vaginal bleeding.      Past Medical History:   Diagnosis Date    Abdominal cramping 07/03/2023    Abnormal mammogram 07/03/2023    Abscess of eyelid right eye, unspecified eyelid 05/19/2021    Eyelid cellulitis, right    Accidental fall 10/08/2023    Allergic rhinitis 07/03/2023    Ankle fracture, left 07/03/2023    Blood glucose abnormal 10/08/2023    Body mass index (BMI) 33.0-33.9, adult 06/21/2021    Body mass index (BMI) of 33.0 to 33.9 in adult    Bursitis of right knee 10/08/2023    Candidiasis, unspecified 08/08/2022    Yeast infection    Cardiomegaly     Chronic back pain     Class 2 obesity with body mass index (BMI) of 35.0 to 35.9 in adult 02/27/2024    35.31 kg/m²    Closed dislocation of ankle 10/08/2023    Complement 4 deficiency (Multi)     denies    Compression of vein 07/03/2023    Constipation 07/03/2023    Depression     DJD (degenerative joint disease)     DM (diabetes mellitus) (Multi)     DVT (deep venous thrombosis) (Multi)     2020  left leg    Dysphagia 07/03/2023    Encounter for other preprocedural examination 07/18/2022    Preop examination    Encounter for other preprocedural examination 02/17/2022    Preop examination    Facial twitching 07/03/2023    Headache 07/03/2023    History of blood transfusion     2020  after surgery    HLD (hyperlipidemia)     HTN (hypertension)     denies    Hypercalcemia  10/08/2023    Hypothyroidism     IBS (irritable bowel syndrome)     Kidney stone 10/08/2023    Lactic acidosis 10/08/2023    Long term (current) use of antibiotics 2020    Prophylactic antibiotic    Lower leg edema 10/08/2023    Lumbar spondylosis     Lung nodules     multiple    Macular degeneration     Malignant neoplasm of bladder (Multi)     May-Thurner syndrome     OA (osteoarthritis)     Obesity, unspecified 2022    Class 2 obesity with body mass index (BMI) of 39.0 to 39.9 in adult    Ocular pain, right eye     Discomfort of right eye    Other amnesia 2022    Memory changes    Other forms of dyspnea 2022    AREVALO (dyspnea on exertion)    Palpitations 2023    PE (pulmonary thromboembolism) (Multi)         Peptic ulcer 07/10/2008    Personal history of other diseases of urinary system 2021    History of hematuria    Personal history of other specified conditions 2021    History of palpitations    Personal history of other specified conditions 2021    History of dysphagia    Right upper quadrant pain 2021    Abdominal pain, RUQ    Sepsis (Multi) 10/08/2023    Vitamin D deficiency     Wears glasses     Wears hearing aid in both ears     Yeast infection 2023     Past Surgical History:   Procedure Laterality Date    BALLOON ANGIOPLASTY, ARTERY      iliac artery stent LLE    CT ABDOMEN PELVIS ANGIOGRAM W AND/OR WO IV CONTRAST  2020    CT ABDOMEN PELVIS ANGIOGRAM W AND/OR WO IV CONTRAST [facility]    CYSTOSCOPY      with TURBT  multiple    IR INTERVENTION FILTER PLACEMENT      IVC FILTER RETRIEVAL      ORIF ANKLE FRACTURE Left     OTHER SURGICAL HISTORY  2020     section    OTHER SURGICAL HISTORY  2022    Transurethral resection of bladder tumor    OTHER SURGICAL HISTORY      cone biopsy    OTHER SURGICAL HISTORY      varicose vein ligation     Social History     Tobacco Use    Smoking status: Former     Current  packs/day: 0.00     Types: Cigarettes     Quit date:      Years since quittin.4    Smokeless tobacco: Never   Vaping Use    Vaping status: Never Used   Substance Use Topics    Alcohol use: Yes     Comment: occasionally    Drug use: Never     Family History   Problem Relation Name Age of Onset    Breast cancer Paternal Grandmother       Current Outpatient Medications   Medication Sig Dispense Refill    betamethasone dipropionate (Diprosone) 0.05 % lotion once daily.      cholecalciferol (Vitamin D-3) 125 MCG (5000 UT) capsule Take 1 capsule (125 mcg) by mouth once daily.      cyanocobalamin (Vitamin B-12) 100 mcg tablet Take 1 tablet (100 mcg) by mouth once daily.      Eliquis 5 mg tablet Take 0.5 tablets (2.5 mg) by mouth 2 times a day.      levothyroxine (Synthroid, Levoxyl) 125 mcg tablet Mon-Sat no meds on Sun 90 tablet 1    lidocaine (Xylocaine) 5 % cream cream Use as directed      liothyronine (Cytomel) 5 mcg tablet Take 1 tablet (5 mcg) by mouth once daily. 30 tablet 5    metFORMIN (Glucophage) 500 mg tablet Take 2 tablets (1,000 mg) by mouth 2 times daily (morning and late afternoon).      omega 3-dha-epa-fish oil (Fish OiL) 1,200 (144-216) mg capsule Take 1 capsule (1,200 mg) by mouth once daily.      pantoprazole (ProtoNix) 40 mg EC tablet Take 1 tablet (40 mg) by mouth once daily in the morning. Take before meals.      rosuvastatin (Crestor) 20 mg tablet Take 1 tablet (20 mg) by mouth once daily.      doxycycline (Monodox) 50 mg capsule Take 1 capsule (50 mg) by mouth once daily.      minoxidil (Loniten) 2.5 mg tablet Take 0.5 tablets (1.25 mg) by mouth 2 times a day.       No current facility-administered medications for this visit.       Physical Examination:  Blood pressure 130/81, pulse 88, weight 86.6 kg (191 lb), SpO2 95%.  No distress  No JVD or carotid bruits  Lungs clear bilaterally  Heart regular and without murmurs  Abdomen soft and non-tender  No leg swelling  Pulses  intact    Pertinent Labs:  Component      Latest Ref Rng 2/27/2024   GLUCOSE      74 - 99 mg/dL    SODIUM      136 - 145 mmol/L    POTASSIUM      3.5 - 5.3 mmol/L    CHLORIDE      98 - 107 mmol/L    Bicarbonate      21 - 32 mmol/L    Anion Gap      10 - 20 mmol/L    Blood Urea Nitrogen      6 - 23 mg/dL    Creatinine      0.50 - 1.05 mg/dL    EGFR      >60 mL/min/1.73m*2    Calcium      8.6 - 10.6 mg/dL    Albumin      3.4 - 5.0 g/dL    Alkaline Phosphatase      33 - 136 U/L    Total Protein      6.4 - 8.2 g/dL    AST      9 - 39 U/L    Bilirubin Total      0.0 - 1.2 mg/dL    ALT      7 - 45 U/L    LEUKOCYTES (10*3/UL) IN BLOOD BY AUTOMATED COUNT, [place] Island[place]      4.4 - 11.3 x10*3/uL 7.4    nRBC      0.0 - 0.0 /100 WBCs 0.0    ERYTHROCYTES (10*6/UL) IN BLOOD BY AUTOMATED COUNT, [place]      4.00 - 5.20 x10*6/uL 4.28    HEMOGLOBIN      12.0 - 16.0 g/dL 12.9    HEMATOCRIT      36.0 - 46.0 % 40.6    MCV      80 - 100 fL 95    MCH      26.0 - 34.0 pg 30.1    MCHC      32.0 - 36.0 g/dL 31.8 (L)    RED CELL DISTRIBUTION WIDTH      11.5 - 14.5 % 14.3    PLATELETS (10*3/UL) IN BLOOD AUTOMATED COUNT, [place]      150 - 450 x10*3/uL 293      Component      Latest Ref Rng 6/13/2024   GLUCOSE      74 - 99 mg/dL 119 (H)    SODIUM      136 - 145 mmol/L 139    POTASSIUM      3.5 - 5.3 mmol/L 4.7    CHLORIDE      98 - 107 mmol/L 108 (H)    Bicarbonate      21 - 32 mmol/L 23    Anion Gap      10 - 20 mmol/L 13    Blood Urea Nitrogen      6 - 23 mg/dL 25 (H)    Creatinine      0.50 - 1.05 mg/dL 0.98    EGFR      >60 mL/min/1.73m*2 61    Calcium      8.6 - 10.6 mg/dL 10.9 (H)    Albumin      3.4 - 5.0 g/dL 4.7    Alkaline Phosphatase      33 - 136 U/L 51    Total Protein      6.4 - 8.2 g/dL 6.6    AST      9 - 39 U/L 13    Bilirubin Total      0.0 - 1.2 mg/dL 0.6    ALT      7 - 45 U/L 15    LEUKOCYTES (10*3/UL) IN BLOOD BY AUTOMATED COUNT, [place]      4.4 - 11.3 x10*3/uL    nRBC      0.0 - 0.0 /100 WBCs    ERYTHROCYTES (10*6/UL) IN  BLOOD BY AUTOMATED COUNT, [place]ic      4.00 - 5.20 x10*6/uL    HEMOGLOBIN      12.0 - 16.0 g/dL    HEMATOCRIT      36.0 - 46.0 %    MCV      80 - 100 fL    MCH      26.0 - 34.0 pg    MCHC      32.0 - 36.0 g/dL    RED CELL DISTRIBUTION WIDTH      11.5 - 14.5 %    PLATELETS (10*3/UL) IN BLOOD AUTOMATED COUNT, [place]ic      150 - 450 x10*3/uL       Legend:  (L) Low  (H) High    Pertinent Imaging:    Diagnoses and all orders for this visit:  Chronic venous insufficiency (Primary)  -     Compression Stockings 15-20 mmHg  May-Thurner syndrome  -     Eliquis 5 mg tablet; Take 1 tablet (5 mg) by mouth 2 times a day.  Continue indefinite anticoagulation     Follow up in one year.    Anupama Frost MD, MS

## 2024-06-16 LAB — COLLAGEN CTX SERPL-MCNC: 304 PG/ML

## 2024-07-02 ENCOUNTER — OFFICE VISIT (OUTPATIENT)
Dept: RHEUMATOLOGY | Facility: CLINIC | Age: 74
End: 2024-07-02
Payer: MEDICARE

## 2024-07-02 VITALS
HEIGHT: 64 IN | DIASTOLIC BLOOD PRESSURE: 72 MMHG | BODY MASS INDEX: 32.44 KG/M2 | OXYGEN SATURATION: 96 % | WEIGHT: 190 LBS | HEART RATE: 76 BPM | SYSTOLIC BLOOD PRESSURE: 110 MMHG

## 2024-07-02 DIAGNOSIS — E83.52 HYPERCALCEMIA: ICD-10-CM

## 2024-07-02 DIAGNOSIS — E21.0 PRIMARY HYPERPARATHYROIDISM (MULTI): Primary | ICD-10-CM

## 2024-07-02 PROCEDURE — 99214 OFFICE O/P EST MOD 30 MIN: CPT | Performed by: INTERNAL MEDICINE

## 2024-07-02 PROCEDURE — 1157F ADVNC CARE PLAN IN RCRD: CPT | Performed by: INTERNAL MEDICINE

## 2024-07-02 PROCEDURE — 3044F HG A1C LEVEL LT 7.0%: CPT | Performed by: INTERNAL MEDICINE

## 2024-07-02 PROCEDURE — 3078F DIAST BP <80 MM HG: CPT | Performed by: INTERNAL MEDICINE

## 2024-07-02 PROCEDURE — 1126F AMNT PAIN NOTED NONE PRSNT: CPT | Performed by: INTERNAL MEDICINE

## 2024-07-02 PROCEDURE — 3074F SYST BP LT 130 MM HG: CPT | Performed by: INTERNAL MEDICINE

## 2024-07-02 PROCEDURE — 1159F MED LIST DOCD IN RCRD: CPT | Performed by: INTERNAL MEDICINE

## 2024-07-02 PROCEDURE — 1123F ACP DISCUSS/DSCN MKR DOCD: CPT | Performed by: INTERNAL MEDICINE

## 2024-07-02 PROCEDURE — 1160F RVW MEDS BY RX/DR IN RCRD: CPT | Performed by: INTERNAL MEDICINE

## 2024-07-02 PROCEDURE — 1036F TOBACCO NON-USER: CPT | Performed by: INTERNAL MEDICINE

## 2024-07-02 ASSESSMENT — ROUTINE ASSESSMENT OF PATIENT INDEX DATA (RAPID3)
DRESS_YOURSELF: WITH SOME DIFFICULTY
WASH_DRY_BODY: WITHOUT ANY DIFFICULTY
ON A SCALE OF ONE TO TEN, HOW MUCH PAIN HAVE YOU HAD BECAUSE OF YOUR CONDITION OVER THE PAST WEEK?: 5
TOTAL RAPID3 SCORE: 12.7
IN_OUT_BED: WITH SOME DIFFICULTY
SEVERITY_SCORE: HIGH SEVERITY (HS)
FEELINGS_DEPRESSION: WITH SOME DIFFICULTY
ON A SCALE OF ONE TO TEN, CONSIDERING ALL THE WAYS IN WHICH ILLNESS AND HEALTH CONDITIONS MAY AFFECT YOU AT THIS TIME, PLEASE INDICATE BELOW HOW YOU ARE DOING:: 5
FN_SCORE: 2.7
WEIGHTED_TOTAL_SCORE: 4.23
WALK_KILOMETERS: WITH MUCH DIFFICULTY
PARTIPATE_RECREATIONAL_ACTIVITIES: WITH SOME DIFFICULTY
IN_OUT_TRANSPORT: WITH SOME DIFFICULTY
SEVERITY_SCORE: 0
FEELINGS_ANXIETY_NERVOUS: WITH SOME DIFFICULTY
ON A SCALE OF ONE TO TEN, HOW MUCH PAIN HAVE YOU HAD BECAUSE OF YOUR CONDITION OVER THE PAST WEEK?: 5
SUM OF QUESTIONS A TO J: 8
TURN_FAUCETS_OFF: WITH SOME DIFFICULTY
LIFT_CUP_TO_MOUTH: WITHOUT ANY DIFFICULTY
WALK_FLAT_GROUND: WITHOUT ANY DIFFICULTY
GOOD_NIGHTS_SLEEP: UNABLE TO DO
PICK_CLOTHES_OFF_FLOOR: WITH SOME DIFFICULTY
ON A SCALE OF ONE TO TEN, CONSIDERING ALL THE WAYS IN WHICH ILLNESS AND HEALTH CONDITIONS MAY AFFECT YOU AT THIS TIME, PLEASE INDICATE BELOW HOW YOU ARE DOING:: 5

## 2024-07-02 ASSESSMENT — PATIENT HEALTH QUESTIONNAIRE - PHQ9
SUM OF ALL RESPONSES TO PHQ9 QUESTIONS 1 AND 2: 1
1. LITTLE INTEREST OR PLEASURE IN DOING THINGS: SEVERAL DAYS
10. IF YOU CHECKED OFF ANY PROBLEMS, HOW DIFFICULT HAVE THESE PROBLEMS MADE IT FOR YOU TO DO YOUR WORK, TAKE CARE OF THINGS AT HOME, OR GET ALONG WITH OTHER PEOPLE: SOMEWHAT DIFFICULT
2. FEELING DOWN, DEPRESSED OR HOPELESS: NOT AT ALL

## 2024-07-02 ASSESSMENT — ENCOUNTER SYMPTOMS
OCCASIONAL FEELINGS OF UNSTEADINESS: 0
DEPRESSION: 0
LOSS OF SENSATION IN FEET: 0

## 2024-07-02 ASSESSMENT — PAIN - FUNCTIONAL ASSESSMENT: PAIN_FUNCTIONAL_ASSESSMENT: 0-10

## 2024-07-02 ASSESSMENT — PAIN SCALES - GENERAL: PAINLEVEL: 0-NO PAIN

## 2024-07-02 NOTE — PROGRESS NOTES
"Chief Complaint:    This 74 y.o. female presents with the chief complaint of osteoporosis (OP)     Subjective:   HPI   Problem:  1: Severe OP       The patient returns for discussion of OP.       The patient has been recently found to have an elevated serum calcium. Review of her recent laboratory results show a persistently modestly elevated serum calcium, normal albumin, normal magnesium, and normal phosphorus levels. The most recent serum calcium is 10.9 mg/dL.        The patient had further studies performed in the evaluation of this newly identified OP, which was determined to be severe due to the presence of asymptomatic vertebral fractures. The studies identified hyperparathyroidism = 188.4 pg/mL.          The patient's principal symptom is easy fatigability.     Review of Systems   All other systems reviewed and are negative.    Objective:   /72   Pulse 76   Ht 1.626 m (5' 4\")   Wt 86.2 kg (190 lb)   LMP  (LMP Unknown)   SpO2 96%   BMI 32.61 kg/m²      Physical Exam  Vitals and nursing note reviewed.   HENT:      Head: Normocephalic.   Eyes:      Extraocular Movements: Extraocular movements intact.      Conjunctiva/sclera: Conjunctivae normal.      Pupils: Pupils are equal, round, and reactive to light.   Neck:      Vascular: No carotid bruit.      Comments: No thyroid enlargement or nodules were palpated.  Cardiovascular:      Rate and Rhythm: Normal rate and regular rhythm.      Pulses: Normal pulses.      Heart sounds: No murmur heard.     No gallop.   Pulmonary:      Effort: Pulmonary effort is normal. No respiratory distress.      Breath sounds: Normal breath sounds. No wheezing, rhonchi or rales.   Abdominal:      General: There is no distension.      Palpations: There is no mass.      Tenderness: There is no abdominal tenderness.      Comments: Obesity   Musculoskeletal:         General: No swelling, tenderness or signs of injury. Normal range of motion.      Cervical back: Normal range of " motion and neck supple. No rigidity or tenderness.   Lymphadenopathy:      Cervical: No cervical adenopathy.   Skin:     Capillary Refill: Capillary refill takes less than 2 seconds.      Findings: No rash.   Neurological:      Mental Status: She is alert.         Assessment:   Osteoporosis - M81.0  Hyperparathyroidism, primary - E21.0  3.   Hypercalcemia - E83.52    Plan:    Osteoporosis, hyperparathyroidism - Evaluation will be done including 24 hr urinary calcium and parathyroid ultrasound to begin this evaluation. Patient is informed in detail of the suspected diagnosis of a parathyroid adenoma(s). All queries were addressed.  Time spent pre-visit = 7 min; Duration of visit = 37 min, including new problem evaluation, discussion and explanation to patient regarding the diagnosis and suspected adenoma, associated hypercalcemia, and answering all queries.   Return to office in 2 weeks.       Savage Sullivan MD

## 2024-07-03 DIAGNOSIS — E03.9 ACQUIRED HYPOTHYROIDISM: ICD-10-CM

## 2024-07-03 LAB — SCAN RESULT: NORMAL

## 2024-07-03 RX ORDER — LIOTHYRONINE SODIUM 5 UG/1
5 TABLET ORAL DAILY
Qty: 30 TABLET | Refills: 5 | Status: SHIPPED | OUTPATIENT
Start: 2024-07-03

## 2024-07-09 ENCOUNTER — APPOINTMENT (OUTPATIENT)
Dept: RADIOLOGY | Facility: HOSPITAL | Age: 74
End: 2024-07-09
Payer: MEDICARE

## 2024-07-10 ENCOUNTER — APPOINTMENT (OUTPATIENT)
Dept: UROLOGY | Facility: CLINIC | Age: 74
End: 2024-07-10
Payer: MEDICARE

## 2024-07-10 ENCOUNTER — LAB (OUTPATIENT)
Dept: LAB | Facility: LAB | Age: 74
End: 2024-07-10
Payer: MEDICARE

## 2024-07-10 DIAGNOSIS — E21.0 PRIMARY HYPERPARATHYROIDISM (MULTI): ICD-10-CM

## 2024-07-10 DIAGNOSIS — C67.9 MALIGNANT NEOPLASM OF URINARY BLADDER, UNSPECIFIED SITE (MULTI): Primary | ICD-10-CM

## 2024-07-10 LAB
CALCIUM (MG/L) IN 24 HOUR URINE: 111 MG/24H (ref 100–300)
CALCIUM 24H UR-MRATE: 5.3 MG/DL
COLLECT DURATION TIME SPEC: 24 HRS
CREAT 24H UR-MCNC: 34.7 MG/DL (ref 20–320)
CREAT 24H UR-MRATE: 0.73 G/24 H (ref 0.67–1.59)
POC APPEARANCE, URINE: CLEAR
POC BILIRUBIN, URINE: NEGATIVE
POC BLOOD, URINE: ABNORMAL
POC COLOR, URINE: YELLOW
POC GLUCOSE, URINE: NEGATIVE MG/DL
POC KETONES, URINE: NEGATIVE MG/DL
POC LEUKOCYTES, URINE: NEGATIVE
POC NITRITE,URINE: NEGATIVE
POC PH, URINE: 6 PH
POC PROTEIN, URINE: NEGATIVE MG/DL
POC SPECIFIC GRAVITY, URINE: 1.02
POC UROBILINOGEN, URINE: 0.2 EU/DL
SPECIMEN VOL 24H UR: 2100 ML

## 2024-07-10 PROCEDURE — 82570 ASSAY OF URINE CREATININE: CPT

## 2024-07-10 PROCEDURE — 99213 OFFICE O/P EST LOW 20 MIN: CPT | Performed by: STUDENT IN AN ORGANIZED HEALTH CARE EDUCATION/TRAINING PROGRAM

## 2024-07-10 PROCEDURE — 82340 ASSAY OF CALCIUM IN URINE: CPT

## 2024-07-10 PROCEDURE — 81050 URINALYSIS VOLUME MEASURE: CPT

## 2024-07-10 NOTE — PROGRESS NOTES
Patient ID: Latosha Larson is a 74 y.o. female.    Procedures  PROCEDURE NOTE:    PREOPERATIVE DIAGNOSIS:  Bladder cancer NMIBC    POSTOPERATIVE DIAGNOSIS:  Same    OPERATION:  Flexible Cystourethroscopy      SURGEON:  Stefany Phipps MD    ANESTHESIA:  2%  lidocaine jelly    COMPLICATIONS:  None    EBL: Minimal    SPECIMEN:  Voided urine was not collected and submitted for cytology.    DISPOSITION:  The patient was discharged home after the procedure, per routine.    INDICATIONS: :  Ms. Larson is a 74 y.o. patient with a history of LG NMIBC who presents today for Cystoscopy.     The indications, risks and benefits of this procedure were discussed with the patient, consent was obtained prior to the procedure, and to the best of my judgement the patient seemed to understand and agree to the procedure.    PROCEDURE:  The patient  was brought into the procedure suite and informed consent was reviewed and confirmed. Vital signs were obtained prior to the procedure: LMP  (LMP Unknown) .  The patient was escorted onto the stretcher, placed supine, prepped with betadine and draped in the usual standard surgical fashion.  Intraurethral 2% viscous lidocaine jelly was used for local analgesia.  A 16 Sinhala flexible cystourethroscope was inserted into the urethra.   The urethra was normal.  Upon entering the bladder the entire bladder was surveyed in a 360 degree fashion.  The left and right ureteral orifices were in normal orthotopic position effluxing clear yellow urine, bilaterally.   There was no evidence of any bladder lesions, foreign objects, stones or evidence of any mucosal changes. The cystoscope was then retroflexed.  The bladder neck was then further examined without any evidence of lesions. The scope was then removed and in an antegrade fashion, the urethra and bladder were again resurveyed with no evidence of additional lesions.  The cystoscope was then fully removed.   The patient tolerated the procedure  well.  Vitals were stable after the procedure.  The patient was able to void and was discharged home.  Verbal and written Post procedure instructions were reviewed with the patient.    IMPRESSION:  No recurrence    PLAN:  Repeat cystoscopy in 6 months    Subjective   Patient ID: Latosha Larson is a 74 y.o. female.    HPI  74 y.o. 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 She is s/p C.     Review of Systems    Objective   Physical Exam    Assessment/Plan   Diagnoses and all orders for this visit:  Malignant neoplasm of urinary bladder, unspecified site (Multi)      Cystoscopy negative  Follow up in six months with another cystoscopy.    Christaibe Attestation  By signing my name below, I, Ryan Pinto,   attest that this documentation has been prepared under the direction and in the presence of Stefany Phipps MD.

## 2024-07-11 ENCOUNTER — HOSPITAL ENCOUNTER (OUTPATIENT)
Dept: RADIOLOGY | Facility: HOSPITAL | Age: 74
Discharge: HOME | End: 2024-07-11
Payer: MEDICARE

## 2024-07-11 DIAGNOSIS — E21.0 PRIMARY HYPERPARATHYROIDISM (MULTI): ICD-10-CM

## 2024-07-11 PROCEDURE — 76536 US EXAM OF HEAD AND NECK: CPT

## 2024-07-14 NOTE — TELEPHONE ENCOUNTER
Chart reviewed, refill request appropriate and sent to pharmacy     Diagnosis: shortness of breath    Return to the emergency department if you have signs of severe difficulty breathing including:   - Breathing too fast  - Not able to say more than 2-3 words at a time without taking a breath  - Blue-dylan or gray/dusky color of the face, lips or fingers  - Muscles pulling in around the neck or ribs    Return to the emergency department at any time if you think that you are getting worse.    Tests today showed:   Labs Reviewed   CBC W/ AUTO DIFFERENTIAL - Abnormal; Notable for the following components:       Result Value    RBC 3.77 (*)     Hemoglobin 11.2 (*)      (*)     MCHC 28.7 (*)     Lymph # 0.5 (*)     Gran % 88.3 (*)     Lymph % 6.6 (*)     Mono % 3.3 (*)     All other components within normal limits   COMPREHENSIVE METABOLIC PANEL - Abnormal; Notable for the following components:    CO2 37 (*)     Glucose 120 (*)     ALT 8 (*)     All other components within normal limits   B-TYPE NATRIURETIC PEPTIDE   SARS-COV-2 RNA AMPLIFICATION, QUAL     US Lower Extremity Veins Right   Final Result      No evidence of deep venous thrombosis in common femoral, femoral, popliteal and upper greater saphenous veins.      Evaluation of right calf veins was not performed.      Electronically signed by resident: Stacey Lomax   Date:    07/14/2024   Time:    10:26      Electronically signed by: Diaz Ku   Date:    07/14/2024   Time:    10:41      X-Ray Chest AP Portable   Final Result      No acute findings      Chronic lung changes.         Electronically signed by: Emery Barger Jr   Date:    07/14/2024   Time:    09:54          Treatments you had today:   Medications   ketorolac injection 9.999 mg (9.999 mg Intravenous Given 7/14/24 0930)       Home Care Instructions:  - Continue taking other home medications as previously prescribed    Follow-Up Plan:  - Follow-up with: Primary care doctor within 2 - 3 days  - Additional outpatient testing and/or evaluation as directed by your  doctor

## 2024-07-25 ENCOUNTER — OFFICE VISIT (OUTPATIENT)
Dept: RHEUMATOLOGY | Facility: CLINIC | Age: 74
End: 2024-07-25
Payer: MEDICARE

## 2024-07-25 VITALS
OXYGEN SATURATION: 98 % | WEIGHT: 188.8 LBS | SYSTOLIC BLOOD PRESSURE: 124 MMHG | HEART RATE: 99 BPM | BODY MASS INDEX: 32.23 KG/M2 | DIASTOLIC BLOOD PRESSURE: 68 MMHG | HEIGHT: 64 IN

## 2024-07-25 DIAGNOSIS — E21.0 PRIMARY HYPERPARATHYROIDISM (MULTI): Primary | ICD-10-CM

## 2024-07-25 DIAGNOSIS — E83.52 HYPERCALCEMIA: ICD-10-CM

## 2024-07-25 DIAGNOSIS — E04.1 THYROID NODULE INCIDENTALLY NOTED ON IMAGING STUDY: ICD-10-CM

## 2024-07-25 PROCEDURE — 3078F DIAST BP <80 MM HG: CPT | Performed by: INTERNAL MEDICINE

## 2024-07-25 PROCEDURE — 99214 OFFICE O/P EST MOD 30 MIN: CPT | Performed by: INTERNAL MEDICINE

## 2024-07-25 PROCEDURE — 1126F AMNT PAIN NOTED NONE PRSNT: CPT | Performed by: INTERNAL MEDICINE

## 2024-07-25 PROCEDURE — 3061F NEG MICROALBUMINURIA REV: CPT | Performed by: INTERNAL MEDICINE

## 2024-07-25 PROCEDURE — 3074F SYST BP LT 130 MM HG: CPT | Performed by: INTERNAL MEDICINE

## 2024-07-25 PROCEDURE — 1036F TOBACCO NON-USER: CPT | Performed by: INTERNAL MEDICINE

## 2024-07-25 PROCEDURE — 1159F MED LIST DOCD IN RCRD: CPT | Performed by: INTERNAL MEDICINE

## 2024-07-25 PROCEDURE — 1123F ACP DISCUSS/DSCN MKR DOCD: CPT | Performed by: INTERNAL MEDICINE

## 2024-07-25 PROCEDURE — 3044F HG A1C LEVEL LT 7.0%: CPT | Performed by: INTERNAL MEDICINE

## 2024-07-25 PROCEDURE — 1157F ADVNC CARE PLAN IN RCRD: CPT | Performed by: INTERNAL MEDICINE

## 2024-07-25 PROCEDURE — 3008F BODY MASS INDEX DOCD: CPT | Performed by: INTERNAL MEDICINE

## 2024-07-25 ASSESSMENT — ENCOUNTER SYMPTOMS
OCCASIONAL FEELINGS OF UNSTEADINESS: 1
LOSS OF SENSATION IN FEET: 0
DEPRESSION: 0

## 2024-07-25 ASSESSMENT — ROUTINE ASSESSMENT OF PATIENT INDEX DATA (RAPID3)
IN_OUT_BED: WITHOUT ANY DIFFICULTY
TURN_FAUCETS_OFF: WITHOUT ANY DIFFICULTY
ON A SCALE OF ONE TO TEN, HOW MUCH PAIN HAVE YOU HAD BECAUSE OF YOUR CONDITION OVER THE PAST WEEK?: 1
FEELINGS_ANXIETY_NERVOUS: WITHOUT ANY DIFFICULTY
FN_SCORE: 1
WEIGHTED_TOTAL_SCORE: 3.17
ON A SCALE OF ONE TO TEN, CONSIDERING ALL THE WAYS IN WHICH ILLNESS AND HEALTH CONDITIONS MAY AFFECT YOU AT THIS TIME, PLEASE INDICATE BELOW HOW YOU ARE DOING:: 7.5
ON A SCALE OF ONE TO TEN, HOW MUCH PAIN HAVE YOU HAD BECAUSE OF YOUR CONDITION OVER THE PAST WEEK?: 1
DRESS_YOURSELF: WITH SOME DIFFICULTY
LIFT_CUP_TO_MOUTH: WITHOUT ANY DIFFICULTY
WALK_FLAT_GROUND: WITHOUT ANY DIFFICULTY
WALK_KILOMETERS: WITH SOME DIFFICULTY
GOOD_NIGHTS_SLEEP: UNABLE TO DO
ON A SCALE OF ONE TO TEN, CONSIDERING ALL THE WAYS IN WHICH ILLNESS AND HEALTH CONDITIONS MAY AFFECT YOU AT THIS TIME, PLEASE INDICATE BELOW HOW YOU ARE DOING:: 7.5
PICK_CLOTHES_OFF_FLOOR: WITHOUT ANY DIFFICULTY
PARTIPATE_RECREATIONAL_ACTIVITIES: WITH SOME DIFFICULTY
TOTAL RAPID3 SCORE: 9.5
WASH_DRY_BODY: WITHOUT ANY DIFFICULTY
SEVERITY_SCORE: MODERATE SEVERITY (MS)
FEELINGS_DEPRESSION: WITHOUT ANY DIFFICULTY
SEVERITY_SCORE: 0
SUM OF QUESTIONS A TO J: 3
IN_OUT_TRANSPORT: WITHOUT ANY DIFFICULTY

## 2024-07-25 ASSESSMENT — PATIENT HEALTH QUESTIONNAIRE - PHQ9
SUM OF ALL RESPONSES TO PHQ9 QUESTIONS 1 AND 2: 0
1. LITTLE INTEREST OR PLEASURE IN DOING THINGS: NOT AT ALL
2. FEELING DOWN, DEPRESSED OR HOPELESS: NOT AT ALL

## 2024-07-25 ASSESSMENT — PAIN SCALES - GENERAL: PAINLEVEL: 0-NO PAIN

## 2024-07-25 NOTE — PROGRESS NOTES
"Chief Complaint:    This 74 y.o. female presents with the chief complaint of possible hyperparathyroidism.    Subjective:   HPI   Problem:  1: Query hyperparathyroidism       The patient has no new symptoms of primary hyperparathyroidism.       The patient has been found to have hypercalcemia and elevated intact PTH.       The patient is undergoing evaluation for this disorder.    Review of Systems   All other systems reviewed and are negative.    Objective:   /68   Pulse 99   Ht 1.626 m (5' 4\")   Wt 85.6 kg (188 lb 12.8 oz)   LMP  (LMP Unknown)   SpO2 98%   BMI 32.41 kg/m²      Physical Exam is not repeated today.  The evaluation is in process. Ultrasound of the neck did not identify a parathyroid adenoma. However, a left thyroid nodule was identified. A 24 hr urine showed a normal calcium level.     Assessment:   Query hyperparathyroidism - E21.0  Thyroid nodule, left side - E04.1    Plan:    Query hyperparathyroidism: I have reviewed the laboratory and imaging results. First, the patient is being scheduled for a high-resolution CT scan of the neck and upper chest with contrast to determine the presence of an adenoma(s). Second, the patient is being referred to endocrinology for the thyroid nodule and further evaluation of possible hyperparathyroidism.  2.   Time to review laboratory results prior to appointment = 5 min. Time for discussion, education, and answering queries as well as determining a referral to endocrinologist = 10 min. Total time 44 min.       Savage Sullivan MD   "

## 2024-08-02 DIAGNOSIS — R52 PAIN: Primary | ICD-10-CM

## 2024-08-05 ENCOUNTER — LAB (OUTPATIENT)
Dept: LAB | Facility: LAB | Age: 74
End: 2024-08-05
Payer: MEDICARE

## 2024-08-05 DIAGNOSIS — E83.52 HYPERCALCEMIA: ICD-10-CM

## 2024-08-05 DIAGNOSIS — R52 PAIN: ICD-10-CM

## 2024-08-05 LAB
CA-I BLD-SCNC: 1.38 MMOL/L (ref 1.1–1.33)
CREAT SERPL-MCNC: 1 MG/DL (ref 0.4–1.6)
EGFRCR SERPLBLD CKD-EPI 2021: 60 ML/MIN/1.73M*2

## 2024-08-05 PROCEDURE — 82330 ASSAY OF CALCIUM: CPT

## 2024-08-05 PROCEDURE — 36415 COLL VENOUS BLD VENIPUNCTURE: CPT

## 2024-08-05 PROCEDURE — 82565 ASSAY OF CREATININE: CPT

## 2024-08-06 ENCOUNTER — HOSPITAL ENCOUNTER (OUTPATIENT)
Dept: RADIOLOGY | Facility: HOSPITAL | Age: 74
Discharge: HOME | End: 2024-08-06
Payer: MEDICARE

## 2024-08-06 DIAGNOSIS — E21.0 PRIMARY HYPERPARATHYROIDISM (MULTI): ICD-10-CM

## 2024-08-06 PROCEDURE — 2550000001 HC RX 255 CONTRASTS: Performed by: INTERNAL MEDICINE

## 2024-08-06 PROCEDURE — 70491 CT SOFT TISSUE NECK W/DYE: CPT

## 2024-08-06 PROCEDURE — 70491 CT SOFT TISSUE NECK W/DYE: CPT | Performed by: RADIOLOGY

## 2024-08-21 ENCOUNTER — TELEPHONE (OUTPATIENT)
Dept: RHEUMATOLOGY | Facility: CLINIC | Age: 74
End: 2024-08-21
Payer: MEDICARE

## 2024-08-21 NOTE — TELEPHONE ENCOUNTER
Telephone call:     I reviewed the patient's laboratory and imaging results. The scan of the neck demonstrated a probable parathyroid adenoma. This may be the cause of the patient's prior history of intermittent hypercalcemia and current findings of hyperparathyrodism.       I discussed these results with the patient.        I recommend referral to Dr Moody Rojas, otolaryngology, for consideration of parathyroidectomy. Patient concurs with this recommendation, and will contact his office for an appointment.

## 2024-08-22 ENCOUNTER — TRANSCRIBE ORDERS (OUTPATIENT)
Dept: RHEUMATOLOGY | Facility: CLINIC | Age: 74
End: 2024-08-22
Payer: MEDICARE

## 2024-08-22 DIAGNOSIS — E04.1 THYROID NODULE INCIDENTALLY NOTED ON IMAGING STUDY: Primary | ICD-10-CM

## 2024-08-22 DIAGNOSIS — E83.52 HYPERCALCEMIA: ICD-10-CM

## 2024-08-27 ENCOUNTER — APPOINTMENT (OUTPATIENT)
Dept: RHEUMATOLOGY | Facility: CLINIC | Age: 74
End: 2024-08-27
Payer: MEDICARE

## 2024-10-02 ENCOUNTER — TELEPHONE (OUTPATIENT)
Dept: PRIMARY CARE | Facility: CLINIC | Age: 74
End: 2024-10-02
Payer: MEDICARE

## 2024-10-02 DIAGNOSIS — E11.9 TYPE 2 DIABETES MELLITUS WITHOUT COMPLICATION, WITHOUT LONG-TERM CURRENT USE OF INSULIN (MULTI): Primary | ICD-10-CM

## 2024-10-04 ENCOUNTER — APPOINTMENT (OUTPATIENT)
Dept: RADIOLOGY | Facility: HOSPITAL | Age: 74
End: 2024-10-04
Payer: MEDICARE

## 2024-10-04 ENCOUNTER — HOSPITAL ENCOUNTER (EMERGENCY)
Facility: HOSPITAL | Age: 74
Discharge: HOME | End: 2024-10-04
Attending: EMERGENCY MEDICINE
Payer: MEDICARE

## 2024-10-04 VITALS
TEMPERATURE: 97 F | DIASTOLIC BLOOD PRESSURE: 92 MMHG | BODY MASS INDEX: 32.22 KG/M2 | RESPIRATION RATE: 20 BRPM | WEIGHT: 188.71 LBS | HEIGHT: 64 IN | SYSTOLIC BLOOD PRESSURE: 163 MMHG | HEART RATE: 98 BPM | OXYGEN SATURATION: 97 %

## 2024-10-04 DIAGNOSIS — M25.572 ACUTE LEFT ANKLE PAIN: ICD-10-CM
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
## 2024-10-04 LAB
ABO GROUP (TYPE) IN BLOOD: NORMAL
ALBUMIN SERPL BCP-MCNC: 4.4 G/DL (ref 3.4–5)
ALP SERPL-CCNC: 51 U/L (ref 33–136)
ALT SERPL W P-5'-P-CCNC: 14 U/L (ref 7–45)
ANION GAP BLDV CALCULATED.4IONS-SCNC: 16 MMOL/L (ref 10–25)
ANION GAP SERPL CALC-SCNC: 17 MMOL/L (ref 10–20)
ANTIBODY SCREEN: NORMAL
AST SERPL W P-5'-P-CCNC: 18 U/L (ref 9–39)
BASE EXCESS BLDV CALC-SCNC: -4.5 MMOL/L (ref -2–3)
BASOPHILS # BLD AUTO: 0.07 X10*3/UL (ref 0–0.1)
BASOPHILS NFR BLD AUTO: 0.6 %
BILIRUB SERPL-MCNC: 0.8 MG/DL (ref 0–1.2)
BODY TEMPERATURE: 37 DEGREES CELSIUS
BUN SERPL-MCNC: 24 MG/DL (ref 6–23)
CA-I BLDV-SCNC: 1.43 MMOL/L (ref 1.1–1.33)
CALCIUM SERPL-MCNC: 11 MG/DL (ref 8.6–10.6)
CHLORIDE BLDV-SCNC: 105 MMOL/L (ref 98–107)
CHLORIDE SERPL-SCNC: 107 MMOL/L (ref 98–107)
CO2 SERPL-SCNC: 21 MMOL/L (ref 21–32)
CREAT SERPL-MCNC: 1.12 MG/DL (ref 0.5–1.05)
EGFRCR SERPLBLD CKD-EPI 2021: 52 ML/MIN/1.73M*2
EOSINOPHIL # BLD AUTO: 0.11 X10*3/UL (ref 0–0.4)
EOSINOPHIL NFR BLD AUTO: 1 %
ERYTHROCYTE [DISTWIDTH] IN BLOOD BY AUTOMATED COUNT: 13.8 % (ref 11.5–14.5)
ETHANOL SERPL-MCNC: <10 MG/DL
GLUCOSE BLDV-MCNC: 138 MG/DL (ref 74–99)
GLUCOSE SERPL-MCNC: 142 MG/DL (ref 74–99)
HCO3 BLDV-SCNC: 21.7 MMOL/L (ref 22–26)
HCT VFR BLD AUTO: 40.6 % (ref 36–46)
HCT VFR BLD EST: 40 % (ref 36–46)
HGB BLD-MCNC: 12.6 G/DL (ref 12–16)
HGB BLDV-MCNC: 13.2 G/DL (ref 12–16)
HOLD SPECIMEN: NORMAL
IMM GRANULOCYTES # BLD AUTO: 0.04 X10*3/UL (ref 0–0.5)
IMM GRANULOCYTES NFR BLD AUTO: 0.4 % (ref 0–0.9)
INR PPP: 1.1 (ref 0.9–1.1)
LACTATE BLDV-SCNC: 2.7 MMOL/L (ref 0.4–2)
LACTATE SERPL-SCNC: 2.2 MMOL/L (ref 0.4–2)
LACTATE SERPL-SCNC: 4.3 MMOL/L (ref 0.4–2)
LYMPHOCYTES # BLD AUTO: 1.81 X10*3/UL (ref 0.8–3)
LYMPHOCYTES NFR BLD AUTO: 16.7 %
MCH RBC QN AUTO: 30.6 PG (ref 26–34)
MCHC RBC AUTO-ENTMCNC: 31 G/DL (ref 32–36)
MCV RBC AUTO: 99 FL (ref 80–100)
MONOCYTES # BLD AUTO: 0.56 X10*3/UL (ref 0.05–0.8)
MONOCYTES NFR BLD AUTO: 5.2 %
NEUTROPHILS # BLD AUTO: 8.25 X10*3/UL (ref 1.6–5.5)
NEUTROPHILS NFR BLD AUTO: 76.1 %
NRBC BLD-RTO: 0 /100 WBCS (ref 0–0)
OXYHGB MFR BLDV: 30 % (ref 45–75)
PCO2 BLDV: 43 MM HG (ref 41–51)
PH BLDV: 7.31 PH (ref 7.33–7.43)
PLATELET # BLD AUTO: 334 X10*3/UL (ref 150–450)
PO2 BLDV: 21 MM HG (ref 35–45)
POTASSIUM BLDV-SCNC: 4.5 MMOL/L (ref 3.5–5.3)
POTASSIUM SERPL-SCNC: 4.1 MMOL/L (ref 3.5–5.3)
PROT SERPL-MCNC: 7.2 G/DL (ref 6.4–8.2)
PROTHROMBIN TIME: 12.6 SECONDS (ref 9.8–12.8)
RBC # BLD AUTO: 4.12 X10*6/UL (ref 4–5.2)
RH FACTOR (ANTIGEN D): NORMAL
SAO2 % BLDV: 30 % (ref 45–75)
SODIUM BLDV-SCNC: 138 MMOL/L (ref 136–145)
SODIUM SERPL-SCNC: 141 MMOL/L (ref 136–145)
WBC # BLD AUTO: 10.8 X10*3/UL (ref 4.4–11.3)

## 2024-10-04 PROCEDURE — 99291 CRITICAL CARE FIRST HOUR: CPT | Performed by: EMERGENCY MEDICINE

## 2024-10-04 PROCEDURE — 73630 X-RAY EXAM OF FOOT: CPT | Mod: LEFT SIDE | Performed by: STUDENT IN AN ORGANIZED HEALTH CARE EDUCATION/TRAINING PROGRAM

## 2024-10-04 PROCEDURE — 84132 ASSAY OF SERUM POTASSIUM: CPT | Performed by: EMERGENCY MEDICINE

## 2024-10-04 PROCEDURE — 82077 ASSAY SPEC XCP UR&BREATH IA: CPT | Performed by: EMERGENCY MEDICINE

## 2024-10-04 PROCEDURE — 73564 X-RAY EXAM KNEE 4 OR MORE: CPT | Mod: RT

## 2024-10-04 PROCEDURE — 73552 X-RAY EXAM OF FEMUR 2/>: CPT | Mod: RIGHT SIDE | Performed by: STUDENT IN AN ORGANIZED HEALTH CARE EDUCATION/TRAINING PROGRAM

## 2024-10-04 PROCEDURE — 73590 X-RAY EXAM OF LOWER LEG: CPT | Mod: RIGHT SIDE | Performed by: STUDENT IN AN ORGANIZED HEALTH CARE EDUCATION/TRAINING PROGRAM

## 2024-10-04 PROCEDURE — 82810 BLOOD GASES O2 SAT ONLY: CPT | Mod: CCI

## 2024-10-04 PROCEDURE — 36415 COLL VENOUS BLD VENIPUNCTURE: CPT | Performed by: EMERGENCY MEDICINE

## 2024-10-04 PROCEDURE — 71045 X-RAY EXAM CHEST 1 VIEW: CPT | Performed by: STUDENT IN AN ORGANIZED HEALTH CARE EDUCATION/TRAINING PROGRAM

## 2024-10-04 PROCEDURE — 73552 X-RAY EXAM OF FEMUR 2/>: CPT | Mod: RT

## 2024-10-04 PROCEDURE — 73630 X-RAY EXAM OF FOOT: CPT | Mod: LT

## 2024-10-04 PROCEDURE — 86901 BLOOD TYPING SEROLOGIC RH(D): CPT | Performed by: EMERGENCY MEDICINE

## 2024-10-04 PROCEDURE — 85025 COMPLETE CBC W/AUTO DIFF WBC: CPT | Performed by: EMERGENCY MEDICINE

## 2024-10-04 PROCEDURE — 73610 X-RAY EXAM OF ANKLE: CPT | Mod: LEFT SIDE | Performed by: STUDENT IN AN ORGANIZED HEALTH CARE EDUCATION/TRAINING PROGRAM

## 2024-10-04 PROCEDURE — 85610 PROTHROMBIN TIME: CPT | Performed by: EMERGENCY MEDICINE

## 2024-10-04 PROCEDURE — 84132 ASSAY OF SERUM POTASSIUM: CPT

## 2024-10-04 PROCEDURE — 99222 1ST HOSP IP/OBS MODERATE 55: CPT | Performed by: STUDENT IN AN ORGANIZED HEALTH CARE EDUCATION/TRAINING PROGRAM

## 2024-10-04 PROCEDURE — 36415 COLL VENOUS BLD VENIPUNCTURE: CPT

## 2024-10-04 PROCEDURE — 99285 EMERGENCY DEPT VISIT HI MDM: CPT

## 2024-10-04 PROCEDURE — 83605 ASSAY OF LACTIC ACID: CPT | Performed by: EMERGENCY MEDICINE

## 2024-10-04 PROCEDURE — 86850 RBC ANTIBODY SCREEN: CPT | Performed by: EMERGENCY MEDICINE

## 2024-10-04 PROCEDURE — 73590 X-RAY EXAM OF LOWER LEG: CPT | Mod: RT

## 2024-10-04 PROCEDURE — 73610 X-RAY EXAM OF ANKLE: CPT | Mod: LT

## 2024-10-04 PROCEDURE — 73590 X-RAY EXAM OF LOWER LEG: CPT | Mod: LEFT SIDE | Performed by: STUDENT IN AN ORGANIZED HEALTH CARE EDUCATION/TRAINING PROGRAM

## 2024-10-04 PROCEDURE — 73564 X-RAY EXAM KNEE 4 OR MORE: CPT | Mod: RIGHT SIDE | Performed by: STUDENT IN AN ORGANIZED HEALTH CARE EDUCATION/TRAINING PROGRAM

## 2024-10-04 PROCEDURE — 73590 X-RAY EXAM OF LOWER LEG: CPT | Mod: LT

## 2024-10-04 PROCEDURE — 71045 X-RAY EXAM CHEST 1 VIEW: CPT

## 2024-10-04 PROCEDURE — 99284 EMERGENCY DEPT VISIT MOD MDM: CPT | Mod: 25

## 2024-10-04 ASSESSMENT — LIFESTYLE VARIABLES
EVER FELT BAD OR GUILTY ABOUT YOUR DRINKING: NO
EVER HAD A DRINK FIRST THING IN THE MORNING TO STEADY YOUR NERVES TO GET RID OF A HANGOVER: NO
HAVE YOU EVER FELT YOU SHOULD CUT DOWN ON YOUR DRINKING: NO
TOTAL SCORE: 0
HAVE PEOPLE ANNOYED YOU BY CRITICIZING YOUR DRINKING: NO

## 2024-10-04 ASSESSMENT — ENCOUNTER SYMPTOMS
CONFUSION: 0
EYE PAIN: 0
CHEST TIGHTNESS: 0
LIGHT-HEADEDNESS: 0
DIZZINESS: 0
AGITATION: 0
FACIAL SWELLING: 0
EYE REDNESS: 0
SHORTNESS OF BREATH: 0
ACTIVITY CHANGE: 0
BACK PAIN: 0

## 2024-10-04 ASSESSMENT — PAIN - FUNCTIONAL ASSESSMENT: PAIN_FUNCTIONAL_ASSESSMENT: 0-10

## 2024-10-04 ASSESSMENT — PAIN SCALES - GENERAL
PAINLEVEL_OUTOF10: 0 - NO PAIN
PAINLEVEL_OUTOF10: 5 - MODERATE PAIN

## 2024-10-04 NOTE — H&P
"[facility]  TRAUMA SERVICE - HISTORY AND PHYSICAL / CONSULT    Patient Name: Latosha Larson  MRN: [id number]  Admit Date: 1004  : 1950  AGE: 74 y.o.   GENDER: female  ==============================================================================  MECHANISM OF INJURY / CHIEF COMPLAINT:   Pt activated as a limited trauma s/p fall with blood thinners. Per pt,was at her apartment doing laundry, noticed water on floor, went to speak with maintenance when she fell going into room down a couple of inches. Pt states she \"crumbled\" down, landing on knee. Pt denies hitting head. Pt states she has right knee and left ankle pain, notes she has a plate and screws in place on left ankle after previous trauma. Formal XR of left ankle and right knee ordered.     LOC (yes/no?): no  Anticoagulant / Anti-platelet Rx? (for what dx?): eliquis for blood clot  Referring Facility Name (N/A for scene EMR run): N/A    INJURIES:   Left ankle pain  Right knee pain    OTHER MEDICAL PROBLEMS:  Hypothyroid  HLD    INCIDENTAL FINDINGS:  Degenerative changes left foot, right hip joint, right knee     ==============================================================================  ADMISSION PLAN OF CARE:  Formal XRs - negative for acute fracture  Consultants notified (specialty, provider name, time): N/A    Dispo: XR bilateral lower extremities negative for acute traumatic injury. Dispo per ED, trauma will sign off. Please call [phone] with any further questions or concerns     Pt discussed with attending Dr. English    Total face to face time spent with patient/family of 40 minutes, with >50% of the time spent discussing plan of care/management, counseling/educating on disease processes, explaining results of diagnostic testing.     Kristin Mcleod PA-C  Trauma, Critical Care, Acute Care Surgery   Floor: [phone]  [id number]   ==============================================================================  PAST " MEDICAL HISTORY:   PMH:   Past Medical History:   Diagnosis Date    Abdominal cramping 07/03/2023    Abnormal mammogram 07/03/2023    Abscess of eyelid right eye, unspecified eyelid 05/19/2021    Eyelid cellulitis, right    Accidental fall 10/08/2023    Allergic rhinitis 07/03/2023    Ankle fracture, left 07/03/2023    Blood glucose abnormal 10/08/2023    Body mass index (BMI) 33.0-33.9, adult 06/21/2021    Body mass index (BMI) of 33.0 to 33.9 in adult    Bursitis of right knee 10/08/2023    Candidiasis, unspecified 08/08/2022    Yeast infection    Cardiomegaly     Chronic back pain     Class 2 obesity with body mass index (BMI) of 35.0 to 35.9 in adult 02/27/2024    35.31 kg/m²    Closed dislocation of ankle 10/08/2023    Complement 4 deficiency (Multi)     denies    Compression of vein 07/03/2023    Constipation 07/03/2023    Depression     DJD (degenerative joint disease)     DM (diabetes mellitus) (Multi)     DVT (deep venous thrombosis) (Multi)     2020  left leg    Dysphagia 07/03/2023    Encounter for other preprocedural examination 07/18/2022    Preop examination    Encounter for other preprocedural examination 02/17/2022    Preop examination    Facial twitching 07/03/2023    Headache 07/03/2023    History of blood transfusion     2020  after surgery    HLD (hyperlipidemia)     HTN (hypertension)     denies    Hypercalcemia 10/08/2023    Hypothyroidism     IBS (irritable bowel syndrome)     Kidney stone 10/08/2023    Lactic acidosis 10/08/2023    Long term (current) use of antibiotics 12/17/2020    Prophylactic antibiotic    Lower leg edema 10/08/2023    Lumbar spondylosis     Lung nodules     multiple    Macular degeneration     Malignant neoplasm of bladder     May-Thurner syndrome     OA (osteoarthritis)     Obesity, unspecified 05/23/2022    Class 2 obesity with body mass index (BMI) of 39.0 to 39.9 in adult    Ocular pain, right eye     Discomfort of right eye    Other amnesia 04/11/2022    Memory  changes    Other forms of dyspnea 2022    AREVALO (dyspnea on exertion)    Palpitations 2023    PE (pulmonary thromboembolism) (Multi)         Peptic ulcer 07/10/2008    Personal history of other diseases of urinary system 2021    History of hematuria    Personal history of other specified conditions 2021    History of palpitations    Personal history of other specified conditions 2021    History of dysphagia    Right upper quadrant pain 2021    Abdominal pain, RUQ    Sepsis (Multi) 10/08/2023    Vitamin D deficiency     Wears glasses     Wears hearing aid in both ears     Yeast infection 2023       PSH:   Past Surgical History:   Procedure Laterality Date    BALLOON ANGIOPLASTY, ARTERY      iliac artery stent LLE    CT ABDOMEN PELVIS ANGIOGRAM W AND/OR WO IV CONTRAST  2020    CT ABDOMEN PELVIS ANGIOGRAM W AND/OR WO IV CONTRAST [facility] LE[facility]    CYSTOSCOPY      with TURBT  multiple    IR INTERVENTION FILTER PLACEMENT      IVC FILTER RETRIEVAL      ORIF ANKLE FRACTURE Left     OTHER SURGICAL HISTORY  2020     section    OTHER SURGICAL HISTORY  2022    Transurethral resection of bladder tumor    OTHER SURGICAL HISTORY      cone biopsy    OTHER SURGICAL HISTORY      varicose vein ligation     FH:   Family History   Problem Relation Name Age of Onset    Breast cancer Paternal Grandmother       SOCIAL HISTORY:    Smoking:  denies current use   Social History     Tobacco Use   Smoking Status Former    Current packs/day: 0.00    Types: Cigarettes    Quit date:     Years since quittin.7   Smokeless Tobacco Never       Alcohol: socially   Social History     Substance and Sexual Activity   Alcohol Use Yes    Comment: occasionally       Drug use: denies     MEDICATIONS:   Prior to Admission medications    Medication Sig Start Date End Date Taking? Authorizing Provider   betamethasone dipropionate (Diprosone) 0.05 % lotion once daily. 24    Historical Provider, MD   blood sugar diagnostic strip 1 each once daily. Need appointment for refills 10/3/24   FAITH Loaiza   cholecalciferol (Vitamin D-3) 125 MCG (5000 UT) capsule Take 1 capsule (125 mcg) by mouth once daily.    Historical Provider, MD   cyanocobalamin (Vitamin B-12) 100 mcg tablet Take 1 tablet (100 mcg) by mouth once daily.    Historical Provider, MD   doxycycline (Monodox) 50 mg capsule Take 1 capsule (50 mg) by mouth once daily. 11/2/23   Historical Provider, MD   Eliquis 5 mg tablet Take 1 tablet (5 mg) by mouth 2 times a day. 6/13/24   Anupama Frost MD, MS   levothyroxine (Synthroid, Levoxyl) 125 mcg tablet Mon-Sat no meds on Sun  Patient taking differently: Mon-Fri no meds on  Sat or Sun 3/11/24   ERICA Loaiza-CNP   lidocaine (Xylocaine) 5 % cream cream Use as directed    Historical Provider, MD   liothyronine (Cytomel) 5 mcg tablet Take 1 tablet (5 mcg) by mouth once daily. 7/3/24   FAITH Loaiza   metFORMIN (Glucophage) 500 mg tablet Take 2 tablets (1,000 mg) by mouth 2 times daily (morning and late afternoon).    Historical Provider, MD   minoxidil (Loniten) 2.5 mg tablet Take 0.5 tablets (1.25 mg) by mouth 2 times a day. 11/2/23   Historical Provider, MD   omega 3-dha-epa-fish oil (Fish OiL) 1,200 (144-216) mg capsule Take 1 capsule (1,200 mg) by mouth once daily.    Historical Provider, MD   pantoprazole (ProtoNix) 40 mg EC tablet Take 1 tablet (40 mg) by mouth once daily in the morning. Take before meals. 10/19/21   Historical Provider, MD   rosuvastatin (Crestor) 20 mg tablet Take 1 tablet (20 mg) by mouth once daily. 5/4/24   Historical Provider, MD     ALLERGIES:   Allergies   Allergen Reactions    Cyclobenzaprine Other, Rash and Hives    Amoxicillin-Pot Clavulanate Other and Nausea/vomiting    Bee Venom Protein (Honey Bee) Swelling    Pneumococcal 23-Katelyn Ps Vaccine Other and Hives       REVIEW OF SYSTEMS:  Review of Systems   Constitutional:  Negative  for activity change.   HENT:  Negative for congestion, facial swelling and nosebleeds.    Eyes:  Negative for pain and redness.   Respiratory:  Negative for chest tightness and shortness of breath.    Cardiovascular:  Negative for chest pain.   Musculoskeletal:  Negative for back pain.   Neurological:  Negative for dizziness and light-headedness.   Psychiatric/Behavioral:  Negative for agitation and confusion.      PHYSICAL EXAM:  PRIMARY SURVEY:  A: Airway intact  B: Breathing spontaneously, breath sounds are bilateral and equal  C: Pulses 2+throughout and equal.    D: Pupils equal and reactive, GCS 15 (E4, V5, M6). Moving all 4 extremities  E: Patient exposed and additional injuries noted; Warm blankets placed on patient     SECONDARY SURVEY/PHYSICAL EXAM:    NEURO: A&O x3, GCS 15, CN II-XII intact, PABON equally, decreased strength left ankle secondary to pain otherwise strength 5/5, no sensory deficits  HEAD: NC/AT, No lacerations or abrasions, no bony step offs, midface stable.  EENT: PERRL, EOMI. Pupils 4-2mm b/l. external ear without laceration. Nasal septum midline, no crepitus or septal hematoma. Oral mucosa and tongue without lacerations, teeth in place.   NECK: No cervical spine tenderness or step offs, no lacerations or abrasions, trachea midline. No JVD.  RESPIRATORY/CHEST: No abrasions, contusions, crepitus or tenderness to palpation. Non-labored, equal chest expansion, CTAB, no W/R/R.  CV: RRR, nml S1 and S2, no M/R/G. Pulses bilateral: 2+ radial, 2+DP, 2+PT,  2+femoral. No TTP of chest  ABDOMEN: soft, nontender, nondistended. No scars, abrasions or lacerations.  PELVIS: Stable to compression.  : nml external genitalia, no blood at urethral meatus  BACK/SPINE: No thoracic midline tenderness, step-offs or deformities. No lumbar midline tenderness, step-offs, or deformities.  No abrasions, hematomas or lacerations noted.  EXTREMITIES: abrasion to right knee, tenderness to left ankle. No edema or  "cyanosis.     IMAGING SUMMARY:  (summary of findings, not a copy of dictation)  CT scans not performed due to mechanism   CXR/PXR: CXR with no acute findings, no cardiopulmonary process appreciated   Other(s): XR left tib/fib, left ankle, left foot, right femur, right knee, rib tib/fib negative for acute fracture    LABS:                No lab exists for component: \"LABALBU\"            I have reviewed all laboratory and imaging results ordered/pertinent for this encounter.     " Color consistent with ethnicity/race, warm, dry intact, resilient.

## 2024-10-04 NOTE — ED TRIAGE NOTES
Pt to ED with c/o fall x today. Pt having L ankle and R knee pain. Pt ambulates with steady gait in triage. Hx osteoporosis, on eliquis due to blood clot. Pt went into a room and tripped down onto a ramp.

## 2024-10-04 NOTE — ED PROVIDER NOTES
History of Present Illness     History provided by: Patient  Limitations to History: None  External Records Reviewed with Brief Summary: None    HPI:  Latosha Larson is a 74 y.o. female with past medical history of hypertension, diabetes, and DVT on Eliquis who presents today for a fall.  Patient reports that she was walking down a ground-level ramp when she accidentally fell, hitting her knee and ankle.  She endorses right knee pain and left ankle pain.  While she is on a blood thinner, she denies hitting her head. She denies any loss of consciousness. She denies any headache, dizziness, or changes in her vision.  She denies any neck pain, back pain, chest pain, shortness of breath, nausea, vomiting, abdominal pain, numbness, weakness, or tingling in her arms or legs, fevers, or chills. She does currently have left ankle pain which makes it difficult to ambulate. She reports that she had felt fine prior to her fall today.    Physical Exam   Triage vitals:  T 36.1 °C (97 °F)  HR (!) 114  /87  RR 17  O2 98 % None (Room air)    Physical Exam  Vitals and nursing note reviewed.   Constitutional:       General: She is not in acute distress.     Appearance: Normal appearance. She is not ill-appearing or diaphoretic.   HENT:      Head: Normocephalic and atraumatic.      Nose: Nose normal.      Mouth/Throat:      Mouth: Mucous membranes are moist.      Pharynx: No oropharyngeal exudate or posterior oropharyngeal erythema.   Eyes:      General: No scleral icterus.     Extraocular Movements: Extraocular movements intact.      Pupils: Pupils are equal, round, and reactive to light.   Neck:      Comments: No midline tenderness. No step-offs or deformities.   Cardiovascular:      Rate and Rhythm: Normal rate and regular rhythm.      Pulses: Normal pulses.      Heart sounds: Normal heart sounds. No murmur heard.     No gallop.   Pulmonary:      Effort: Pulmonary effort is normal. No respiratory distress.      Breath  sounds: Normal breath sounds. No stridor. No wheezing, rhonchi or rales.   Abdominal:      General: Bowel sounds are normal. There is no distension.      Palpations: Abdomen is soft. There is no mass.      Tenderness: There is no abdominal tenderness. There is no guarding.      Hernia: No hernia is present.   Musculoskeletal:         General: No swelling, deformity or signs of injury. Normal range of motion.      Cervical back: Normal range of motion and neck supple. No tenderness.      Comments: Superficial abrasion over right anterior knee with tenderness to palpation. Tenderness to palpation of left lateral malleolus, otherwise no evidence of trauma. 5 out of 5 strength in dorsiflexion plantarflexion hip flexion, handgrip elbow flexion extension bilaterally. Compartments are soft. No crepitus. Neurovascularly intact throughout.    Skin:     General: Skin is warm.      Capillary Refill: Capillary refill takes less than 2 seconds.      Findings: No erythema, lesion or rash.   Neurological:      General: No focal deficit present.      Mental Status: She is alert and oriented to person, place, and time. Mental status is at baseline.      Cranial Nerves: No cranial nerve deficit.      Sensory: No sensory deficit.      Motor: No weakness.      Comments: Strength 5/5 in upper and lower extremities bilaterally. Sensation intact to light touch throughout.   Psychiatric:         Mood and Affect: Mood normal.         Behavior: Behavior normal.          Medical Decision Making & ED Course   Medical Decision Makin y.o. female with past medical history of hypertension, hyperlipidemia, diabetes, previous left ankle fracture, as well as DVT on Eliquis who presents today for fall.  Patient was initially activated in triage as a limited trauma activation given her fall on thinners. However, given the fact that the patient did not strike her head or lose consciousness, my concern for this being an intracranial hemorrhage is  low. The patient was also evaluated by the trauma team at bedside, who also felt there was a low concern for intracranial hemorrhage.  Patient's c-spine was cleared clinically. Given patient's symptoms, we did get plain film x-rays of the patient's affected lower extremities to confirm she had no fracture. Patient denied needing anything for pain at this time. We did also get labs.  Patient's initial lactate was elevated at 4.3, which did downtrend to 2.2 with fluids. Remainder of labs were unremarkable. Her xray imaging showed no fractures or dislocations. Per discussion with Trauma team, the patient was cleared for discharge by Trauma service. Patient was informed of the results in detail. She remained stable and tolerated po intake. She felt comfortable being discharged home. I did provide the patient return precautions as well as recommended that she have the nurse wrap her ankle for support before she leaves. The patient was instructed of supportive measures and to follow-up with a primary care physician and orthopedics as needed. Return precautions were provided, for which the patient expressed understanding. The patient was discharged home in stable condition. They should feel free to return to the Emergency Department at any time should their condition worsen or should they have any questions or concerns.   ----      Differential diagnoses considered include but are not limited to: Bimalleolar fracture, ankle sprain, patellar fracture, musculoskeletal pain, contusion, sprain/strain     Social Determinants of Health which Significantly Impact Care: None identified     EKG Independent Interpretation: EKG not obtained    Independent Result Review and Interpretation: Relevant laboratory and radiographic results were reviewed and independently interpreted by myself.  As necessary, they are commented on in the ED Course.    Chronic conditions affecting the patient's care: As documented above in MDM    The patient  was discussed with the following consultants/services:  Trauma    Care Considerations: As documented above in [facility]    ED Course:  Diagnoses as of 10/05/24 0036   Fall, initial encounter   Acute left ankle pain     Disposition   As a result of the work-up, the patient was discharged home.  she was informed of her diagnosis and instructed to come back with any concerns or worsening of condition.  she and was agreeable to the plan as discussed above.  she was given the opportunity to ask questions.  All of the patient's questions were answered.    Procedures   Procedures    Patient seen and discussed with ED attending physician.    Alonzo Hyman MD  Emergency Medicine       Alonzo Hyman MD  Resident  10/05/24 0039    I saw and evaluated the patient. I personally obtained the key and critical portions of the history and physical exam or was physically present for key and critical portions performed by the resident/fellow. I reviewed the resident/fellow's documentation and discussed the patient with the resident/fellow. I agree with the resident/fellow's medical decision making as documented in the note.    MD Alyssa Collins MD  10/06/24 3920

## 2024-10-05 NOTE — DISCHARGE INSTRUCTIONS
You were seen today because you have left ankle pain after you fell.  We got x-rays, it does not appear that you have any fracture or dislocation.  Given this, you can use Tylenol and ibuprofen as well as muscle relaxers and topical lidocaine to treat your pain.  You can walk on this as tolerated, however, if you are having significant pain, I would recommend that you avoid walking on it.  Please return to the hospital if you develop any numbness or weakness, or anything else you find concerning.

## 2024-10-05 NOTE — ED PROCEDURE NOTE
Procedure  Critical Care    Performed by: Alyssa Hernandez MD  Authorized by: Alyssa Hernandez MD    Critical care provider statement:     Critical care time (minutes):  30    Critical care time was exclusive of:  Separately billable procedures and treating other patients    Critical care was necessary to treat or prevent imminent or life-threatening deterioration of the following conditions:  Trauma    Critical care was time spent personally by me on the following activities:  Ordering and performing treatments and interventions, ordering and review of laboratory studies, ordering and review of radiographic studies, development of treatment plan with patient or surrogate, discussions with consultants, examination of patient, re-evaluation of patient's condition, obtaining history from patient or surrogate and review of old charts               Alyssa Hernandez MD  10/05/24 0030

## 2024-10-07 DIAGNOSIS — M17.0 PRIMARY OSTEOARTHRITIS OF BOTH KNEES: ICD-10-CM

## 2024-10-08 ENCOUNTER — OFFICE VISIT (OUTPATIENT)
Dept: ORTHOPEDIC SURGERY | Facility: HOSPITAL | Age: 74
End: 2024-10-08
Payer: MEDICARE

## 2024-10-08 ENCOUNTER — HOSPITAL ENCOUNTER (OUTPATIENT)
Dept: RADIOLOGY | Facility: HOSPITAL | Age: 74
Discharge: HOME | End: 2024-10-08
Payer: MEDICARE

## 2024-10-08 ENCOUNTER — APPOINTMENT (OUTPATIENT)
Dept: RHEUMATOLOGY | Facility: CLINIC | Age: 74
End: 2024-10-08
Payer: MEDICARE

## 2024-10-08 VITALS — HEIGHT: 64 IN | WEIGHT: 188 LBS | BODY MASS INDEX: 32.1 KG/M2

## 2024-10-08 DIAGNOSIS — G89.29 CHRONIC PAIN OF BOTH KNEES: ICD-10-CM

## 2024-10-08 DIAGNOSIS — M25.562 CHRONIC PAIN OF BOTH KNEES: ICD-10-CM

## 2024-10-08 DIAGNOSIS — M17.0 PRIMARY OSTEOARTHRITIS OF BOTH KNEES: ICD-10-CM

## 2024-10-08 DIAGNOSIS — M25.561 CHRONIC PAIN OF BOTH KNEES: ICD-10-CM

## 2024-10-08 PROCEDURE — 1159F MED LIST DOCD IN RCRD: CPT | Performed by: STUDENT IN AN ORGANIZED HEALTH CARE EDUCATION/TRAINING PROGRAM

## 2024-10-08 PROCEDURE — 73560 X-RAY EXAM OF KNEE 1 OR 2: CPT | Mod: LT

## 2024-10-08 PROCEDURE — 1123F ACP DISCUSS/DSCN MKR DOCD: CPT | Performed by: STUDENT IN AN ORGANIZED HEALTH CARE EDUCATION/TRAINING PROGRAM

## 2024-10-08 PROCEDURE — 99203 OFFICE O/P NEW LOW 30 MIN: CPT | Performed by: STUDENT IN AN ORGANIZED HEALTH CARE EDUCATION/TRAINING PROGRAM

## 2024-10-08 PROCEDURE — 1125F AMNT PAIN NOTED PAIN PRSNT: CPT | Performed by: STUDENT IN AN ORGANIZED HEALTH CARE EDUCATION/TRAINING PROGRAM

## 2024-10-08 PROCEDURE — 3008F BODY MASS INDEX DOCD: CPT | Performed by: STUDENT IN AN ORGANIZED HEALTH CARE EDUCATION/TRAINING PROGRAM

## 2024-10-08 PROCEDURE — 1036F TOBACCO NON-USER: CPT | Performed by: STUDENT IN AN ORGANIZED HEALTH CARE EDUCATION/TRAINING PROGRAM

## 2024-10-08 PROCEDURE — 1157F ADVNC CARE PLAN IN RCRD: CPT | Performed by: STUDENT IN AN ORGANIZED HEALTH CARE EDUCATION/TRAINING PROGRAM

## 2024-10-08 PROCEDURE — 73560 X-RAY EXAM OF KNEE 1 OR 2: CPT | Mod: LEFT SIDE | Performed by: STUDENT IN AN ORGANIZED HEALTH CARE EDUCATION/TRAINING PROGRAM

## 2024-10-08 PROCEDURE — 99213 OFFICE O/P EST LOW 20 MIN: CPT | Performed by: STUDENT IN AN ORGANIZED HEALTH CARE EDUCATION/TRAINING PROGRAM

## 2024-10-08 PROCEDURE — 3044F HG A1C LEVEL LT 7.0%: CPT | Performed by: STUDENT IN AN ORGANIZED HEALTH CARE EDUCATION/TRAINING PROGRAM

## 2024-10-08 PROCEDURE — 3061F NEG MICROALBUMINURIA REV: CPT | Performed by: STUDENT IN AN ORGANIZED HEALTH CARE EDUCATION/TRAINING PROGRAM

## 2024-10-08 PROCEDURE — 1160F RVW MEDS BY RX/DR IN RCRD: CPT | Performed by: STUDENT IN AN ORGANIZED HEALTH CARE EDUCATION/TRAINING PROGRAM

## 2024-10-08 ASSESSMENT — PAIN SCALES - GENERAL: PAINLEVEL_OUTOF10: 2

## 2024-10-08 ASSESSMENT — ENCOUNTER SYMPTOMS
DEPRESSION: 0
LOSS OF SENSATION IN FEET: 0
OCCASIONAL FEELINGS OF UNSTEADINESS: 0

## 2024-10-08 ASSESSMENT — PAIN - FUNCTIONAL ASSESSMENT: PAIN_FUNCTIONAL_ASSESSMENT: 0-10

## 2024-10-08 NOTE — PROGRESS NOTES
"Orthopaedic Surgery  New Patient Clinic Note    Latosha Larson [id number] October 8, 2024    Reason for Consult: Bilateral knee pain    HPI: This is a very pleasant 74-year-old female who presents to discuss her bilateral knee osteoarthritis.  She also has osteoporosis and states that she was looking for somebody to discuss some of her back pain related issues.  In regards to the arthritis she states that it does not hurt \"that bad\" and sometimes gets up to 1-2/10 in terms of its severity.  She did have a period where her left knee seem to be waking her from sleep at night and got an intra-articular injection from her rheumatologist and she states that basically cleared up that issue.  She has had the right knee injected more than 10 years ago but otherwise just takes occasional ibuprofen.  She is never done physical therapy.  Right now she is not really interested in surgery but just wanted to see if there was any other \"wraps\" or other things to consider short of surgery. She take OAC at baseline.    ROS:  15 point review of systems collected per intake sheet and negative except for as noted in HPI.    PMH:   Past Medical History:   Diagnosis Date    Abdominal cramping 07/03/2023    Abnormal mammogram 07/03/2023    Abscess of eyelid right eye, unspecified eyelid 05/19/2021    Eyelid cellulitis, right    Accidental fall 10/08/2023    Allergic rhinitis 07/03/2023    Ankle fracture, left 07/03/2023    Blood glucose abnormal 10/08/2023    Body mass index (BMI) 33.0-33.9, adult 06/21/2021    Body mass index (BMI) of 33.0 to 33.9 in adult    Bursitis of right knee 10/08/2023    Candidiasis, unspecified 08/08/2022    Yeast infection    Cardiomegaly     Chronic back pain     Class 2 obesity with body mass index (BMI) of 35.0 to 35.9 in adult 02/27/2024    35.31 kg/m²    Closed dislocation of ankle 10/08/2023    Complement 4 deficiency (Multi)     denies    Compression of vein 07/03/2023    Constipation 07/03/2023    " Depression     DJD (degenerative joint disease)     DM (diabetes mellitus) (Multi)     DVT (deep venous thrombosis) (Multi)     2020  left leg    Dysphagia 07/03/2023    Encounter for other preprocedural examination 07/18/2022    Preop examination    Encounter for other preprocedural examination 02/17/2022    Preop examination    Facial twitching 07/03/2023    Headache 07/03/2023    History of blood transfusion     2020  after surgery    HLD (hyperlipidemia)     HTN (hypertension)     denies    Hypercalcemia 10/08/2023    Hypothyroidism     IBS (irritable bowel syndrome)     Kidney stone 10/08/2023    Lactic acidosis 10/08/2023    Long term (current) use of antibiotics 12/17/2020    Prophylactic antibiotic    Lower leg edema 10/08/2023    Lumbar spondylosis     Lung nodules     multiple    Macular degeneration     Malignant neoplasm of bladder     May-Thurner syndrome     OA (osteoarthritis)     Obesity, unspecified 05/23/2022    Class 2 obesity with body mass index (BMI) of 39.0 to 39.9 in adult    Ocular pain, right eye     Discomfort of right eye    Other amnesia 04/11/2022    Memory changes    Other forms of dyspnea 06/03/2022    AREVALO (dyspnea on exertion)    Palpitations 07/03/2023    PE (pulmonary thromboembolism) (Multi)     2020    Peptic ulcer 07/10/2008    Personal history of other diseases of urinary system 05/05/2021    History of hematuria    Personal history of other specified conditions 03/16/2021    History of palpitations    Personal history of other specified conditions 09/03/2021    History of dysphagia    Right upper quadrant pain 09/03/2021    Abdominal pain, RUQ    Sepsis (Multi) 10/08/2023    Vitamin D deficiency     Wears glasses     Wears hearing aid in both ears     Yeast infection 07/03/2023    No history of DVT.     PSH:    Past Surgical History:   Procedure Laterality Date    BALLOON ANGIOPLASTY, ARTERY      iliac artery stent LLE    CT ABDOMEN PELVIS ANGIOGRAM W AND/OR WO IV CONTRAST   2020    CT ABDOMEN PELVIS ANGIOGRAM W AND/OR WO IV CONTRAST [facility]    CYSTOSCOPY      with TURBT  multiple    IR INTERVENTION FILTER PLACEMENT      IVC FILTER RETRIEVAL      ORIF ANKLE FRACTURE Left     OTHER SURGICAL HISTORY  2020     section    OTHER SURGICAL HISTORY  2022    Transurethral resection of bladder tumor    OTHER SURGICAL HISTORY      cone biopsy    OTHER SURGICAL HISTORY      varicose vein ligation        SHx: No smoking. No IVDU    Meds:   Current Outpatient Medications on File Prior to Visit   Medication Sig Dispense Refill    betamethasone dipropionate (Diprosone) 0.05 % lotion once daily.      blood sugar diagnostic strip 1 each once daily. Need appointment for refills 30 each 2    cholecalciferol (Vitamin D-3) 125 MCG (5000 UT) capsule Take 1 capsule (125 mcg) by mouth once daily.      cyanocobalamin (Vitamin B-12) 100 mcg tablet Take 1 tablet (100 mcg) by mouth once daily.      doxycycline (Monodox) 50 mg capsule Take 1 capsule (50 mg) by mouth once daily.      Eliquis 5 mg tablet Take 1 tablet (5 mg) by mouth 2 times a day. 60 tablet 11    levothyroxine (Synthroid, Levoxyl) 125 mcg tablet Mon-Sat no meds on Sun (Patient taking differently: Mon-Fri no meds on  Sat or Sun) 90 tablet 1    lidocaine (Xylocaine) 5 % cream cream Use as directed      liothyronine (Cytomel) 5 mcg tablet Take 1 tablet (5 mcg) by mouth once daily. 30 tablet 5    metFORMIN (Glucophage) 500 mg tablet Take 2 tablets (1,000 mg) by mouth 2 times daily (morning and late afternoon).      minoxidil (Loniten) 2.5 mg tablet Take 0.5 tablets (1.25 mg) by mouth 2 times a day.      omega 3-dha-epa-fish oil (Fish OiL) 1,200 (144-216) mg capsule Take 1 capsule (1,200 mg) by mouth once daily.      pantoprazole (ProtoNix) 40 mg EC tablet Take 1 tablet (40 mg) by mouth once daily in the morning. Take before meals.      rosuvastatin (Crestor) 20 mg tablet Take 1 tablet (20 mg) by mouth once daily.        No current facility-administered medications on file prior to visit.       PHYSICAL EXAM    GEN: AaOx4, NAD  HEENT: normocephalic atraumatic, EOMI, MMM, pupils equal and round  PSYCH: appropriate mood and affect  RESP: nonlabored breathing   CARDIAC: Extremities WWP, RRR to peripheral palpation  NEURO: CN 2-12 grossly intact  SKIN: Bruise over right knee but otherwise atraumatic    Physical exam of the right lower extremity shows medial and lateral joint line pain.  No significant valgus/varus instability.  EHL, dorsiflexion and plantarflexion are intact.  She has palpable pulses brisk capillary refill distally.  Sensation is intact light touch.  She has range of motion from 0 degrees to 120 degrees.    Physical exam of the left lower extremity shows no medial and lateral joint line pain.  No significant valgus/varus instability.  EHL, dorsiflexion and plantarflexion are intact.  She has palpable pulses brisk capillary refill distally.  Sensation is intact light touch.  She has range of motion from 0 degrees to 120 degrees.    Imaging:    XR of the bilateral knee, obtained and personally reviewed today, shows severe tricompartmental osteoarthritis of both knees, right worse than left.  There is joint space narrowing, subchondral sclerosis as well as peripheral osteophyte formation.      Assessment:    74-year-old female with bilateral knee osteoarthritis    Plan:    We went over both operative as well as conservative management options for her knee arthritis.  I discussed that typically we try conservative measures with anti-inflammatories, topical anti-inflammatories, physical therapy as well as potentially injections before we ever consider an operation.  Her only surgical option would be a total knee arthroplasty however she states that right now her pain is not bad enough to consider this and I would agree that waiting until her pain was more significant would probably be the best.  For that reason I would  recommend continued anti-inflammatories, physical therapy as well as topical creams and ointments.  I told her that there is not necessarily a brace that I would recommend but she can use anything confined that seems to make her symptoms better.  Right now they seem to be relatively minimal.  She can follow-up with me on an as-needed basis moving forward.

## 2024-10-10 ENCOUNTER — TELEPHONE (OUTPATIENT)
Dept: PRIMARY CARE | Facility: CLINIC | Age: 74
End: 2024-10-10
Payer: MEDICARE

## 2024-10-10 DIAGNOSIS — E11.9 TYPE 2 DIABETES MELLITUS WITHOUT COMPLICATION, WITHOUT LONG-TERM CURRENT USE OF INSULIN (MULTI): ICD-10-CM

## 2024-10-11 ENCOUNTER — TELEPHONE (OUTPATIENT)
Dept: OBSTETRICS AND GYNECOLOGY | Facility: CLINIC | Age: 74
End: 2024-10-11
Payer: MEDICARE

## 2024-10-11 NOTE — TELEPHONE ENCOUNTER
Pt calling to schedule NP appt.  Pt states  10/02/24 she noticed a few red streaks on her underwar throughout the day and the next day had some blood in her urine.  Symptoms resolved following that and has not any any blood since.  Pt was advised to follow up with GYN.  Appt scheduled

## 2024-11-07 ENCOUNTER — OFFICE VISIT (OUTPATIENT)
Dept: OPHTHALMOLOGY | Facility: CLINIC | Age: 74
End: 2024-11-07
Payer: MEDICARE

## 2024-11-07 DIAGNOSIS — H35.3131 EARLY DRY STAGE NONEXUDATIVE AGE-RELATED MACULAR DEGENERATION OF BOTH EYES: Primary | ICD-10-CM

## 2024-11-07 DIAGNOSIS — H18.523 ANTERIOR BASEMENT MEMBRANE DYSTROPHY OF BOTH EYES: ICD-10-CM

## 2024-11-07 DIAGNOSIS — Z96.1 ARTIFICIAL LENS PRESENT: ICD-10-CM

## 2024-11-07 DIAGNOSIS — E11.9 TYPE 2 DIABETES MELLITUS WITHOUT COMPLICATION, WITHOUT LONG-TERM CURRENT USE OF INSULIN (MULTI): ICD-10-CM

## 2024-11-07 DIAGNOSIS — H52.7 REFRACTIVE ERRORS: ICD-10-CM

## 2024-11-07 PROCEDURE — 99214 OFFICE O/P EST MOD 30 MIN: CPT | Performed by: OPHTHALMOLOGY

## 2024-11-07 PROCEDURE — 92134 CPTRZ OPH DX IMG PST SGM RTA: CPT | Performed by: OPHTHALMOLOGY

## 2024-11-07 ASSESSMENT — KERATOMETRY
OS_K1POWER_DIOPTERS: 43.75
OD_AXISANGLE2_DEGREES: 180
OD_AXISANGLE_DEGREES: 90
OS_K2POWER_DIOPTERS: 45.50
OD_K1POWER_DIOPTERS: 44.00
OD_K2POWER_DIOPTERS: 45.75
OS_AXISANGLE_DEGREES: 85
OS_AXISANGLE2_DEGREES: 175

## 2024-11-07 ASSESSMENT — REFRACTION_WEARINGRX
OS_ADD: +2.50
OD_SPHERE: -0.25
OD_CYLINDER: -2.50
OD_ADD: +2.50
OS_AXIS: 180
OD_AXIS: 009
SPECS_TYPE: PAL
OS_SPHERE: -0.25
OS_CYLINDER: -1.25

## 2024-11-07 ASSESSMENT — ENCOUNTER SYMPTOMS
EYES NEGATIVE: 0
ALLERGIC/IMMUNOLOGIC NEGATIVE: 0
NEUROLOGICAL NEGATIVE: 0
CARDIOVASCULAR NEGATIVE: 0
RESPIRATORY NEGATIVE: 0
MUSCULOSKELETAL NEGATIVE: 0
CONSTITUTIONAL NEGATIVE: 0
PSYCHIATRIC NEGATIVE: 0
GASTROINTESTINAL NEGATIVE: 0
HEMATOLOGIC/LYMPHATIC NEGATIVE: 0
ENDOCRINE NEGATIVE: 0

## 2024-11-07 ASSESSMENT — REFRACTION_MANIFEST
OD_SPHERE: +0.25
OS_CYLINDER: -0.75
OS_AXIS: 165
OS_SPHERE: -0.25
OD_CYLINDER: -2.00
METHOD_AUTOREFRACTION: 1
OD_AXIS: 180

## 2024-11-07 ASSESSMENT — CUP TO DISC RATIO
OS_RATIO: 0.3
OD_RATIO: 0.3

## 2024-11-07 ASSESSMENT — TONOMETRY
OD_IOP_MMHG: 15
IOP_METHOD: GOLDMANN APPLANATION
OS_IOP_MMHG: 15

## 2024-11-07 ASSESSMENT — EXTERNAL EXAM - LEFT EYE: OS_EXAM: NORMAL

## 2024-11-07 ASSESSMENT — SLIT LAMP EXAM - LIDS
COMMENTS: NORMAL
COMMENTS: NORMAL

## 2024-11-07 ASSESSMENT — PATIENT HEALTH QUESTIONNAIRE - PHQ9
2. FEELING DOWN, DEPRESSED OR HOPELESS: NOT AT ALL
1. LITTLE INTEREST OR PLEASURE IN DOING THINGS: NOT AT ALL
SUM OF ALL RESPONSES TO PHQ9 QUESTIONS 1 AND 2: 0

## 2024-11-07 ASSESSMENT — VISUAL ACUITY
METHOD: SNELLEN - SINGLE
CORRECTION_TYPE: GLASSES
OS_CC: 20/20
OD_CC: 20/20

## 2024-11-07 ASSESSMENT — PAIN SCALES - GENERAL: PAINLEVEL_OUTOF10: 0-NO PAIN

## 2024-11-07 ASSESSMENT — EXTERNAL EXAM - RIGHT EYE: OD_EXAM: NORMAL

## 2024-11-07 NOTE — PROGRESS NOTES
Assessment/Plan   Problem List Items Addressed This Visit       Type 2 diabetes mellitus     Advised no signs of diabetic changes on exam. Recommend to continue best sugar control and on need for annual checkup. Understands role of good BP control and weight loss if applicable. Discussed some components of selected studies like WESDR, DCCT, and UKPDS to describe the likely course of diabetes and effects on the eyes.           Artificial lens present     Stable appearing intraocular lens (IOL) both eyes, will monitor with serial exam.          Early dry stage nonexudative age-related macular degeneration of both eyes - Primary     Advised on nature of dry macular degeneration. Discussed dietary changes along with amsler grid. Will monitor over time with serial exam.          Relevant Orders    OCT, Retina - OU - Both Eyes    Refractive errors     Refraction performed today for diagnostic purposes only and without specific intent to dispense Rx. Glasses/SCL Rx not dispensed today.            Anterior basement membrane dystrophy of both eyes     Discussed role of good lubrication for optimum ocular surface comfort and vision.             Provided reassurance regarding above diagnoses and care received in the office visit today. Discussed outcomes and options along with the importance of treatment compliance. Understands the importance of any follow up visits. Patient instructed to call/communicate with our office if any new issues, questions, or concerns.     Will plan to see back in 1 year full OCT mac or sooner PRN

## 2024-11-07 NOTE — ASSESSMENT & PLAN NOTE
Advised on nature of dry macular degeneration. Discussed dietary changes along with amsler grid. Will monitor over time with serial exam.

## 2024-11-07 NOTE — PATIENT INSTRUCTIONS
Thank you so much for choosing me to provide your care today!    If you were dilated your vision may remain blurry   or light sensitive for several hours.    The nature of eye and vision problems can require frequent follow up, please make every effort to adhere to any future appointments.    If you have any issues, questions, or concerns,   please do not hesitate to reach out.    If you receive a survey in regards to your care today, please mention any exceptional care my office staff and/or technicians provided.    You can reach our office at this number:    [phone]    Please consider signing up for and utilizing [employer]!  This is the best way to directly reach me or other  providers    Artificial Tears/lubricating drops  Recommendations for daily use      Refresh  [x]Refresh Tears []Refresh Advance  []Refresh Optive  []Refresh LiquiGel  []Refresh preservative free in single use vial    Systane  [x]Systane ultra []Systane Balance  []Systane Gel   []Systane preservative free in single use vial    Genteal  []Genteal  []Genteal gel    Blink  []Blink   []Blink for contacts      You may use these drops:  [x]1 drop 2-4 times daily  []1 drop 4 times daily  []1 drop every 1-2 hours or as needed  []At bedtime    It is OK to substitute generic store brand varieties of drops. Artificial tears work best when used consistently instead of as needed.    *Avoid any drops for allergy/itching/redness unless directed otherwise*

## 2024-11-12 NOTE — PROGRESS NOTES
GYNECOLOGY OFFICE VISIT   Subjective     History Of Present Illness:    Latosha Larson is a 74 y.o.  presenting for New Patient Visit.    Patient is here due to an episode of suspected vaginal bleeding. She states that around 6-8 weeks ago, she noted blood when wiping after urination and a moderate amount of blood in the toilet. She has not had any bleeding since that time. Notably, patient does have a history of bladder cancer for which she is following with Urology, Dr. Phipps.     She went through menopause in her early 50s. She has not had any bleeding since that time.     Denies family history of breast, colon, vulvar, vaginal, cervical, uterine, or ovarian cancer.   +Paternal grandmother had breast cancer    Review of Systems:  Denies abdominal pain, pelvic pain, nausea, vomiting, unexplained weight loss, early satiety, urinary frequency, dysuria, abnormal vaginal discharge, or dyspareunia.     Menstrual History:  OB History          4    Para   3    Term   3       0    AB   1    Living   3         SAB   1    IAB   0    Ectopic   0    Multiple   0    Live Births   3               No LMP recorded (lmp unknown). Patient is postmenopausal.     Last Pap Smear:  Result Date Procedure Results Follow-ups Next Due   2015 CONVERTED GYN CYTOLOGY NILM, HR HPV neg        Last Mammogram:  24  Impression  No mammographic evidence of malignancy.  BI-RADS CATEGORY:  BI-RADS Category:  1 Negative.  Recommendation:  Annual Screening.  Recommended Date:  1 Year.  Laterality:  Bilateral.    Last DEXA:  23   - Impression -  DEXA:  According to World Health Organization criteria,  classification is low bone mass (osteopenia).  Followup recommended in two years or sooner as clinically warranted.    Last Colonoscopy:  10/19/21- repeat not recommended     Past Medical History:  Past Medical History:   Diagnosis Date    Allergic rhinitis 2023    Ankle fracture, left 2023    Body mass  index (BMI) 33.0-33.9, adult 2021    Body mass index (BMI) of 33.0 to 33.9 in adult    Bursitis of right knee 10/08/2023    Cardiomegaly     Chronic back pain     Class 2 obesity with body mass index (BMI) of 35.0 to 35.9 in adult 2024    35.31 kg/m²    Closed dislocation of ankle 10/08/2023    Complement 4 deficiency (Multi)     denies    Compression of vein 2023    Depression     Diabetes mellitus (Multi)     Type II    DJD (degenerative joint disease)     DVT (deep venous thrombosis) (Multi)       left leg    Dysphagia 2023    History of blood transfusion       after surgery    HLD (hyperlipidemia)     HTN (hypertension)     denies    Hypercalcemia 10/08/2023    Hypothyroidism     IBS (irritable bowel syndrome)     Kidney stone 10/08/2023    Lactic acidosis 10/08/2023    Lower leg edema 10/08/2023    Lumbar spondylosis     Lung nodules     multiple    Macular degeneration     Malignant neoplasm of bladder     May-Thurner syndrome     OA (osteoarthritis)     Other amnesia 2022    Memory changes    Other forms of dyspnea 2022    AREVALO (dyspnea on exertion)    Palpitations 2023    PE (pulmonary thromboembolism) (Multi)         Peptic ulcer 07/10/2008    Right upper quadrant pain 2021    Abdominal pain, RUQ    Sepsis (Multi) 10/08/2023    Vitamin D deficiency     Wears hearing aid in both ears      Past Surgical History:  Past Surgical History:   Procedure Laterality Date    BALLOON ANGIOPLASTY, ARTERY      iliac artery stent LLE    BLADDER TUMOR EXCISION  2022    Transurethral resection of bladder tumor    CATARACT EXTRACTION W/  INTRAOCULAR LENS IMPLANT Bilateral     Dr Alfonso    CERVICAL BIOPSY  W/ LOOP ELECTRODE EXCISION      cone biopsy     SECTION, LOW TRANSVERSE  2020     section    CT ABDOMEN PELVIS ANGIOGRAM W AND/OR WO IV CONTRAST  2020    CT ABDOMEN PELVIS ANGIOGRAM W AND/OR WO IV CONTRAST [facility]     CYSTOSCOPY      with TURBT  multiple    IR INTERVENTION FILTER PLACEMENT      IVC FILTER RETRIEVAL      ORIF ANKLE FRACTURE Left     VARICOSE VEIN SURGERY      varicose vein ligation     Social History:  Social History     Tobacco Use    Smoking status: Former     Current packs/day: 0.00     Types: Cigarettes     Quit date:      Years since quittin.8    Smokeless tobacco: Never   Substance Use Topics    Alcohol use: Yes     Comment: Occasionally       Family History:  Family History   Problem Relation Name Age of Onset    Lupus Mother      Heart disease Mother      Heart disease Father      Cancer Father      Breast cancer Paternal Grandmother        Allergies:  Cyclobenzaprine, Amoxicillin-pot clavulanate, Bee venom protein (honey bee), and Pneumococcal 23-florin ps vaccine    Outpatient Medications:  Current Outpatient Medications   Medication Instructions    betamethasone dipropionate (Diprosone) 0.05 % lotion once daily.    blood sugar diagnostic strip 1 each, miscellaneous, Daily, Need appointment for refills    cholecalciferol (VITAMIN D-3) 125 mcg, Daily    cyanocobalamin (VITAMIN B-12) 100 mcg, Daily    Eliquis 5 mg, oral, 2 times daily    levothyroxine (Synthroid, Levoxyl) 125 mcg tablet Mon-Sat no meds on Sun    liothyronine (CYTOMEL) 5 mcg, oral, Daily    metFORMIN (GLUCOPHAGE) 1,000 mg, 2 times daily (morning and late afternoon)    omega 3-dha-epa-fish oil (Fish OiL) 1,200 (144-216) mg capsule 1 capsule, Daily    pantoprazole (PROTONIX) 40 mg, Daily before breakfast    rosuvastatin (CRESTOR) 20 mg, Daily     Objective   Last Recorded Vitals:  Visit Vitals  /62     Physical Exam:  General: Alert and oriented, in no acute distress.  Psych: Normal affect and mood. Able to answer questions appropriately.   GYN:  Speculum:  Vulva: Atrophic tissue noted without masses or lesions.   Vagina: Atrophic vaginal mucosa without masses or lesions. No abnormal vaginal discharge.   Cervix: Normal without  erythema, masses, lesions, or signs of cervicitis.  Bimanual:   Cervix: No cervical motion tenderness.  Uterus: Normal, mobile, non-enlarged, non tender uterus.  Adnexa: Normal without tenderness, nodularity, masses, or lesions.    Assessment/Plan     74 y.o.  , here for an episode of post menopausal bleeding   Assessment & Plan  Post-menopausal bleeding  -Patient with an isolated episode of bleeding that she believes is from her vagina  -Exam notable today for atrophy consistent with her post menopausal status  -Discussed obtaining a pelvic US to evaluate her endometrial thickness and reviewed potential next steps which may include the need for endometrial sampling  -Patient also provided with ACOG educational pamphlet on post menopausal bleeding  -Patient voiced understanding of all of this information and is agreeable to this plan of care  Orders:    US PELVIS TRANSABDOMINAL WITH TRANSVAGINAL; Future    POCT UA Automated manually resulted    History of bladder cancer  -Patient with a history of bladder cancer for which she follows with Urology, Dr. Desire Morgan in the office today showing large 3+ blood in the urine  -Advised patient to follow up with Urology for further evaluation as well  Orders:    POCT UA Automated manually resulted      Dawit Sanchez MD

## 2024-11-13 ENCOUNTER — OFFICE VISIT (OUTPATIENT)
Dept: OBSTETRICS AND GYNECOLOGY | Facility: CLINIC | Age: 74
End: 2024-11-13
Payer: MEDICARE

## 2024-11-13 VITALS — DIASTOLIC BLOOD PRESSURE: 62 MMHG | BODY MASS INDEX: 32.89 KG/M2 | WEIGHT: 191.6 LBS | SYSTOLIC BLOOD PRESSURE: 120 MMHG

## 2024-11-13 DIAGNOSIS — Z85.51 HISTORY OF BLADDER CANCER: ICD-10-CM

## 2024-11-13 DIAGNOSIS — N95.0 POST-MENOPAUSAL BLEEDING: Primary | ICD-10-CM

## 2024-11-13 PROCEDURE — 1123F ACP DISCUSS/DSCN MKR DOCD: CPT | Performed by: STUDENT IN AN ORGANIZED HEALTH CARE EDUCATION/TRAINING PROGRAM

## 2024-11-13 PROCEDURE — 1126F AMNT PAIN NOTED NONE PRSNT: CPT | Performed by: STUDENT IN AN ORGANIZED HEALTH CARE EDUCATION/TRAINING PROGRAM

## 2024-11-13 PROCEDURE — 1036F TOBACCO NON-USER: CPT | Performed by: STUDENT IN AN ORGANIZED HEALTH CARE EDUCATION/TRAINING PROGRAM

## 2024-11-13 PROCEDURE — 99203 OFFICE O/P NEW LOW 30 MIN: CPT | Performed by: STUDENT IN AN ORGANIZED HEALTH CARE EDUCATION/TRAINING PROGRAM

## 2024-11-13 PROCEDURE — 3061F NEG MICROALBUMINURIA REV: CPT | Performed by: STUDENT IN AN ORGANIZED HEALTH CARE EDUCATION/TRAINING PROGRAM

## 2024-11-13 PROCEDURE — 3078F DIAST BP <80 MM HG: CPT | Performed by: STUDENT IN AN ORGANIZED HEALTH CARE EDUCATION/TRAINING PROGRAM

## 2024-11-13 PROCEDURE — 99213 OFFICE O/P EST LOW 20 MIN: CPT | Performed by: STUDENT IN AN ORGANIZED HEALTH CARE EDUCATION/TRAINING PROGRAM

## 2024-11-13 PROCEDURE — 1159F MED LIST DOCD IN RCRD: CPT | Performed by: STUDENT IN AN ORGANIZED HEALTH CARE EDUCATION/TRAINING PROGRAM

## 2024-11-13 PROCEDURE — 81003 URINALYSIS AUTO W/O SCOPE: CPT | Performed by: STUDENT IN AN ORGANIZED HEALTH CARE EDUCATION/TRAINING PROGRAM

## 2024-11-13 PROCEDURE — 3074F SYST BP LT 130 MM HG: CPT | Performed by: STUDENT IN AN ORGANIZED HEALTH CARE EDUCATION/TRAINING PROGRAM

## 2024-11-13 PROCEDURE — 1157F ADVNC CARE PLAN IN RCRD: CPT | Performed by: STUDENT IN AN ORGANIZED HEALTH CARE EDUCATION/TRAINING PROGRAM

## 2024-11-13 PROCEDURE — 3044F HG A1C LEVEL LT 7.0%: CPT | Performed by: STUDENT IN AN ORGANIZED HEALTH CARE EDUCATION/TRAINING PROGRAM

## 2024-11-13 PROCEDURE — 1160F RVW MEDS BY RX/DR IN RCRD: CPT | Performed by: STUDENT IN AN ORGANIZED HEALTH CARE EDUCATION/TRAINING PROGRAM

## 2024-11-13 RX ORDER — DIPHENHYDRAMINE HCL 25 MG
25 TABLET ORAL NIGHTLY PRN
COMMUNITY
End: 2024-11-13 | Stop reason: WASHOUT

## 2024-11-13 ASSESSMENT — ENCOUNTER SYMPTOMS
DEPRESSION: 0
OCCASIONAL FEELINGS OF UNSTEADINESS: 0
LOSS OF SENSATION IN FEET: 0

## 2024-11-13 ASSESSMENT — PATIENT HEALTH QUESTIONNAIRE - PHQ9
2. FEELING DOWN, DEPRESSED OR HOPELESS: NOT AT ALL
1. LITTLE INTEREST OR PLEASURE IN DOING THINGS: NOT AT ALL
SUM OF ALL RESPONSES TO PHQ9 QUESTIONS 1 & 2: 0
SUM OF ALL RESPONSES TO PHQ9 QUESTIONS 1 & 2: 0
2. FEELING DOWN, DEPRESSED OR HOPELESS: NOT AT ALL
1. LITTLE INTEREST OR PLEASURE IN DOING THINGS: NOT AT ALL
SUM OF ALL RESPONSES TO PHQ9 QUESTIONS 1 & 2: 0
1. LITTLE INTEREST OR PLEASURE IN DOING THINGS: NOT AT ALL
2. FEELING DOWN, DEPRESSED OR HOPELESS: NOT AT ALL

## 2024-11-13 ASSESSMENT — LIFESTYLE VARIABLES
SKIP TO QUESTIONS 9-10: 0
HOW OFTEN DO YOU HAVE SIX OR MORE DRINKS ON ONE OCCASION: LESS THAN MONTHLY
AUDIT-C TOTAL SCORE: 2
HOW MANY STANDARD DRINKS CONTAINING ALCOHOL DO YOU HAVE ON A TYPICAL DAY: 1 OR 2
HOW OFTEN DO YOU HAVE A DRINK CONTAINING ALCOHOL: MONTHLY OR LESS

## 2024-11-13 ASSESSMENT — PAIN SCALES - GENERAL: PAINLEVEL_OUTOF10: 0-NO PAIN

## 2024-11-14 ENCOUNTER — OFFICE VISIT (OUTPATIENT)
Dept: PULMONOLOGY | Facility: HOSPITAL | Age: 74
End: 2024-11-14
Payer: MEDICARE

## 2024-11-14 VITALS
DIASTOLIC BLOOD PRESSURE: 79 MMHG | TEMPERATURE: 97.9 F | SYSTOLIC BLOOD PRESSURE: 135 MMHG | HEART RATE: 109 BPM | BODY MASS INDEX: 32.27 KG/M2 | OXYGEN SATURATION: 97 % | WEIGHT: 188 LBS

## 2024-11-14 DIAGNOSIS — R91.8 MULTIPLE LUNG NODULES: Primary | ICD-10-CM

## 2024-11-14 PROCEDURE — 1157F ADVNC CARE PLAN IN RCRD: CPT | Performed by: NURSE PRACTITIONER

## 2024-11-14 PROCEDURE — 99213 OFFICE O/P EST LOW 20 MIN: CPT | Performed by: NURSE PRACTITIONER

## 2024-11-14 PROCEDURE — 3078F DIAST BP <80 MM HG: CPT | Performed by: NURSE PRACTITIONER

## 2024-11-14 PROCEDURE — 3075F SYST BP GE 130 - 139MM HG: CPT | Performed by: NURSE PRACTITIONER

## 2024-11-14 PROCEDURE — 3044F HG A1C LEVEL LT 7.0%: CPT | Performed by: NURSE PRACTITIONER

## 2024-11-14 PROCEDURE — 1159F MED LIST DOCD IN RCRD: CPT | Performed by: NURSE PRACTITIONER

## 2024-11-14 PROCEDURE — 3061F NEG MICROALBUMINURIA REV: CPT | Performed by: NURSE PRACTITIONER

## 2024-11-14 PROCEDURE — 1036F TOBACCO NON-USER: CPT | Performed by: NURSE PRACTITIONER

## 2024-11-14 PROCEDURE — 1123F ACP DISCUSS/DSCN MKR DOCD: CPT | Performed by: NURSE PRACTITIONER

## 2024-11-14 PROCEDURE — 1126F AMNT PAIN NOTED NONE PRSNT: CPT | Performed by: NURSE PRACTITIONER

## 2024-11-14 ASSESSMENT — ENCOUNTER SYMPTOMS
HEADACHES: 0
VOICE CHANGE: 0
ARTHRALGIAS: 1
JOINT SWELLING: 0
VOMITING: 0
NERVOUS/ANXIOUS: 0
BACK PAIN: 1
FEVER: 0
DIARRHEA: 0
RHINORRHEA: 0
NUMBNESS: 0
DIZZINESS: 0
WEAKNESS: 0
SINUS PRESSURE: 0
EYE PAIN: 0
PALPITATIONS: 0
FATIGUE: 1
ABDOMINAL PAIN: 0
AGITATION: 0
MYALGIAS: 1
NAUSEA: 0

## 2024-11-14 ASSESSMENT — LIFESTYLE VARIABLES
SKIP TO QUESTIONS 9-10: 1
HOW OFTEN DO YOU HAVE SIX OR MORE DRINKS ON ONE OCCASION: NEVER
HOW MANY STANDARD DRINKS CONTAINING ALCOHOL DO YOU HAVE ON A TYPICAL DAY: 1 OR 2
HOW OFTEN DO YOU HAVE A DRINK CONTAINING ALCOHOL: MONTHLY OR LESS
AUDIT-C TOTAL SCORE: 1

## 2024-11-14 ASSESSMENT — PAIN SCALES - GENERAL: PAINLEVEL_OUTOF10: 0-NO PAIN

## 2024-11-14 NOTE — PATIENT INSTRUCTIONS
1. Dyspnea on exertion: PFTs without obstruction/ restriction. Echo 2020 normal.   - increase activity as tolerated     2. Pulmonary nodules: see on CT chest from 11/2020. Former smoker - nodules stable on most recent CT   - given 2 year stability no need for further follow up imaging      3. Hx of PE: currently on eliquis      4. Allergic rhinitis:   - start cetirizine (zyrtec) 10mg daily at bedtime as needed   - purchase nasal saline over the counter - use 2-3 x per day to rinse out nasal passages and keep them moist ((simply saline))       Thank you for visiting the Pulmonary clinic today!   Return to clinic as needed -- can repeat PFTs depending on improvement after thyroid evaluation   Laura Lozada CNP  My office -  [phone]- Barbara is my . Alessandra is my nurse [phone].   Radiology scheduling [phone]   Appointment scheduling [phone]   Pulmonary function testing - [phone]

## 2024-11-14 NOTE — PROGRESS NOTES
Patient: Latosha Larson    [id number]  : 1950 -- AGE 74 y.o.    Provider: ERICA Campuzano-CNP     Location [facility]   Service Date: 2024              [facility]  Follow up Visit Note      HISTORY OF PRESENT ILLNESS     The patient's referring provider is: No ref. provider found    HISTORY OF PRESENT ILLNESS   Latosha Larson is a 74 y.o. female who presents to a [facility] for an evaluation with concerns of Pt. here today for NPV. I have independently interviewed and examined the patient in the office and reviewed available records.    Current History    Ms. Larson is a 74 year old CF (former smoker ~25 pack years) here for follow up for dyspnea on exertion and pulmonary nodules. Last seen in clinic on 6/3/22.      PCP: Alyssa Alfaro CNP     On today's visit, the patient reports she has not had the energy to do a whole lot. If she does cleaning around the house she gets tired and sits down. She found out she has a growth on her thyroid and parathyroid. This was found in August, but she does not have an appt with endo/ ENT until end of this month/ early December. Vitamin D was normal in May. She denies any cough currently - last summer she had a cough for a while. At that time it was mostly dry. We had discussed last time trying Prn zyrtec - has not tried it and states she is already taking to many medications. She denies any wheezing, SOB at rest, or CP. Her GERD is under good control taking pantoprazole a few times a week.      6/3/22: Since last visit she feels she has been doing better. She has AREVALO with doing things around the house or rushing around the house. She is not as active as she knows she should be. She has issues with motivation/ getting up as going as well. She denies any wheezing, SOB at rest, or CP. She had a dry cough in , but then it stopped away on its own. She denies  any cough now. She had had intermittent episodes of not feeling well for a few days and then feeling better and going back/forth. She feels overall she has been doing better. She feels her sinuses have been bothersome. She has been taking OTC generic allergy medications PRN. She has a runny nose, nasal congestion, and post nasal drip. She will at times need to bring up mucous from the back of her throat.      4/27/22: She has occasional AREVALO with doing chores around the house or walking. She feels she has been noticing it more frequently. She has been having issues with depression/fibromyalgia so her motivation to do things is lacking as well. She has had issues with her fibromyalgia - she was seeing a Dr. Rodriguez who was assisting with homeopathic supplements to help with that. She had a flare up on the supplements so she stopped them. She was recently started on Cymbalta by her PCP. She states she had a cough intermittently since 1/2020. She states it was here an there, but then later resolved. She denies any wheezing, SOB at rest, or CP. She has some post nasal drip and runny nose. She does have some sinus/allergy issues with the weather changing for which she will take something OTC. She states her sister states her voice is slightly more hoarse than normal. She takes pantoprazole daily for GERD with good control.      Previous pulmonary history: PE in 11/2020     Inhalers/nebulized medications: none      Hospitalization History: She has not been hospitalized over the last year for breathing related problem.     Sleep history: Denies snoring or witnessed apneas. She has fatigue and naps related to fibromyalgia                                    ALLERGIES AND MEDICATIONS     ALLERGIES  Allergies   Allergen Reactions    Cyclobenzaprine Other, Rash and Hives    Amoxicillin-Pot Clavulanate Other and Nausea/vomiting    Bee Venom Protein (Honey Bee) Swelling    Pneumococcal 23-Katelyn Ps Vaccine Other and Hives        MEDICATIONS  Current Outpatient Medications   Medication Sig Dispense Refill    betamethasone dipropionate (Diprosone) 0.05 % lotion once daily.      blood sugar diagnostic strip 1 each once daily. Need appointment for refills 90 each 0    cholecalciferol (Vitamin D-3) 125 MCG (5000 UT) capsule Take 1 capsule (125 mcg) by mouth once daily.      cyanocobalamin (Vitamin B-12) 100 mcg tablet Take 1 tablet (100 mcg) by mouth once daily.      Eliquis 5 mg tablet Take 1 tablet (5 mg) by mouth 2 times a day. 60 tablet 11    levothyroxine (Synthroid, Levoxyl) 125 mcg tablet Mon-Sat no meds on Sun 90 tablet 1    liothyronine (Cytomel) 5 mcg tablet Take 1 tablet (5 mcg) by mouth once daily. 30 tablet 5    metFORMIN (Glucophage) 500 mg tablet Take 2 tablets (1,000 mg) by mouth 2 times daily (morning and late afternoon).      omega 3-dha-epa-fish oil (Fish OiL) 1,200 (144-216) mg capsule Take 1 capsule (1,200 mg) by mouth once daily.      pantoprazole (ProtoNix) 40 mg EC tablet Take 1 tablet (40 mg) by mouth once daily in the morning. Take before meals.      rosuvastatin (Crestor) 20 mg tablet Take 1 tablet (20 mg) by mouth once daily.       No current facility-administered medications for this visit.         PAST HISTORY     PAST MEDICAL HISTORY  Comorbidities:  - 2020 - May Thurner syndrome - s/p stent. Post op bleeding requiring ICU/transfusion. PE s/p IVC filter- LLE DVT. She has been on eliquis since   - hypothyroidism   - depression   - DMII   - GERD   - fibromyalgia   - IBS          PAST SURGICAL HISTORY  Past Surgical History:   Procedure Laterality Date    BALLOON ANGIOPLASTY, ARTERY      iliac artery stent LLE    BLADDER TUMOR EXCISION  2022    Transurethral resection of bladder tumor    CATARACT EXTRACTION W/  INTRAOCULAR LENS IMPLANT Bilateral     Dr Alfonso    CERVICAL BIOPSY  W/ LOOP ELECTRODE EXCISION      cone biopsy     SECTION, LOW TRANSVERSE  2020     section    CT ABDOMEN  PELVIS ANGIOGRAM W AND/OR WO IV CONTRAST  2020    CT ABDOMEN PELVIS ANGIOGRAM W AND/OR WO IV CONTRAST [facility]    CYSTOSCOPY      with TURBT  multiple    IR INTERVENTION FILTER PLACEMENT      IVC FILTER RETRIEVAL      ORIF ANKLE FRACTURE Left     VARICOSE VEIN SURGERY      varicose vein ligation       IMMUNIZATION HISTORY  Immunization History   Administered Date(s) Administered    Flu vaccine, quadrivalent, high-dose, preservative free, age 65y+ (FLUZONE) 2020    Flu vaccine, quadrivalent, no egg protein, age 6 month or greater (FLUCELVAX) 10/09/2019    Flu vaccine, trivalent, preservative free, HIGH-DOSE, age 65y+ (Fluzone) 2017, 10/31/2018    Influenza Whole 2008    Influenza, Seasonal, Quadrivalent, Adjuvanted 2021    Influenza, seasonal, injectable 10/01/2008, 2014, 2015, 11/10/2016    Novel influenza-H1N1-09, preservative-free 2009    Pfizer COVID-19 vaccine, 12 years and older, (30mcg/0.3mL) (Comirnaty) 11/15/2023, 10/16/2024    Pfizer Purple Cap SARS-CoV-2 2021, 2021, 2021    Pneumococcal conjugate vaccine, 13-valent (PREVNAR 13) 2016    Pneumococcal polysaccharide vaccine, 23-valent, age 2 years and older (PNEUMOVAX 23) 2017       SOCIAL HISTORY  smokin-41 1 ppd ~25 pack years   drinking: none  illicit drug use: none    OCCUPATIONAL/ENVIRONMENTAL HISTORY  Occupation: Retired - reporting/writer/  for [employer]     FAMILY HISTORY  Family History: Dad and son - SARAHI     RESULTS/DATA     Pulmonary Function Test Results     PFTs: 6/3/22 - FEV1/FVC 0.79/ FEV1 2.35 (108%)/ FVC 2.93 (104%)/ TLC 4.67 (93%)/ DLCO 78%      6MWT: 6/3/22 - 401m (LLN 242m) 97 -> 96%       Chest Radiograph     XR chest 1 view 10/04/2024  Impression  No evidence of acute cardiopulmonary process.      Chest CT Scan     CT chest:   20 - IMPRESSION:  There are a few pulmonary nodules measuring up to 4 mm in size observed.  There is  very low likelihood of these nodules becoming clinically active  cancer due to size.  Enlarged pulmonary trunk which may be an indication of pulmonary artery  hypertension.  Coronary artery calcification.      11/15/20 - IMPRESSION:  No pulmonary emboli with no aneurysm or dissection of thoracic aorta.  Stable pulmonary nodules measuring up to 4 mm in size. These are unchanged  since 6/12/2020.  May-Thurner syndrome. 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    11/24/20 - 2 bilateral upper lobe pulmonary arterial emboli are noted.  2. The critical findings in this report were communicated to Dr. Renetta Hughes DO by Gurvinder Monahan MD (resident) On 11/24/2020 at  approximately 15:37 via phone.  3. New confluent areas of ground-glass opacities of the left upper  lobe.  4. Trace bibasilar pleural effusions are noted.  5. Redemonstration of 2 stable pulmonary nodules in the left upper  lobe.    5/12/22 - Stable appearing tiny subcentimeter nodules of the left upper  lobe, largest measuring up to 4 mm and unchanged in size since CT  imaging 11/24/2020, likely benign.  2. No new suspicious pulmonary nodule or mass.  3. Prominent main pulmonary artery compatible with likely pulmonary  hypertension.  4. Coronary atherosclerotic calcifications.    Echocardiogram      Echo 1/7/20 -                                                 Left ventricle chamber size is normal.                                                        Left ventricle function is normal.                                                 Estimated EF is 55-59%.                                                            Right ventricle size is normal.                                               "       Right ventricle function is normal.                                                             Pericardium is normal.                                                  Aorta is normal.                                                            Aortic valve is structurally normal. No evidence of regurgitation.                                                         A trace of mitral regurgitation.                                                     Pulmonic valve is not well visualized.                                                        Mild tricuspid regurgitation.TR peak velocity is 240 cm/s RVSP is 26                                                mmHg        Echo: -[facility] -11/16/20 - Exam quality: fair  The left ventricular chamber size is normal.  There is normal left ventricular systolic function.  Estimated ejection fraction is 55-59%.  The right ventricular global systolic function is normal.  Aortic valve is structurally normal. No evidence of regurgitation.  Mitral valve is structurally normal. No evidence of regurgitation.  Trivial tricuspid regurgitation.  Unable to estimate the right ventricular systolic pressure.    Other testing/ Labs      Eosinophils Absolute   Date Value   10/04/2024 0.11 x10*3/uL   09/08/2022 0.06 K/UL   03/06/2022 0.09 x10E9/L   01/31/2022 0.08 K/UL   11/24/2020 0.18 x10E9/L   11/22/2020 0.12 x10E9/L   11/21/2020 0.24 x10E9/L     No results found for: \"IGE\"    REVIEW OF SYSTEMS     REVIEW OF SYSTEMS  Review of Systems   Constitutional:  Positive for fatigue. Negative for fever.   HENT:  Negative for congestion, postnasal drip, rhinorrhea, sinus pressure and voice change.    Eyes:  Negative for pain and visual disturbance.   Cardiovascular:  Positive for leg swelling. Negative for chest pain and palpitations.   Gastrointestinal:  Negative for abdominal pain, diarrhea, nausea and vomiting.   Endocrine: Negative for cold intolerance and heat intolerance. "   Musculoskeletal:  Positive for arthralgias, back pain and myalgias. Negative for joint swelling.   Skin:  Negative for rash.   Neurological:  Negative for dizziness, weakness, numbness and headaches.   Psychiatric/Behavioral:  Negative for agitation. The patient is not nervous/anxious.          PHYSICAL EXAM     VITAL SIGNS: /79   Pulse 109   Temp 36.6 °C (97.9 °F)   Wt 85.3 kg (188 lb)   LMP  (LMP Unknown)   SpO2 97% Comment: RA  BMI 32.27 kg/m²      CURRENT WEIGHT: [unfilled]  BMI: [unfilled]  PREVIOUS WEIGHTS:  Wt Readings from Last 3 Encounters:   11/14/24 85.3 kg (188 lb)   11/13/24 86.9 kg (191 lb 9.6 oz)   10/08/24 85.3 kg (188 lb)       Physical Exam  Vitals reviewed.   Constitutional:       General: She is not in acute distress.     Appearance: Normal appearance. She is not ill-appearing or toxic-appearing.   HENT:      Head: Normocephalic.      Nose: No rhinorrhea.   Cardiovascular:      Rate and Rhythm: Normal rate and regular rhythm.      Heart sounds: Normal heart sounds.   Pulmonary:      Effort: Pulmonary effort is normal. No respiratory distress.      Breath sounds: Normal breath sounds. No stridor. No wheezing, rhonchi or rales.   Abdominal:      General: Abdomen is flat.   Musculoskeletal:         General: Normal range of motion.      Right lower leg: Edema present.      Left lower leg: Edema present.      Comments: Trace to 1+ BLE edema    Skin:     General: Skin is warm and dry.      Nails: There is no clubbing.   Neurological:      General: No focal deficit present.      Mental Status: She is alert and oriented to person, place, and time.   Psychiatric:         Mood and Affect: Mood normal.         Behavior: Behavior normal.         Judgment: Judgment normal.         ASSESSMENT/PLAN       1. Dyspnea on exertion: PFTs without obstruction/ restriction. Echo 2020 normal.   - increase activity as tolerated     2. Pulmonary nodules: see on CT chest from 11/2020. Former smoker - nodules  stable on most recent CT   - given 2 year stability no need for further follow up imaging      3. Hx of PE: currently on eliquis      4. Allergic rhinitis:   - start cetirizine (zyrtec) 10mg daily at bedtime as needed   - purchase nasal saline over the counter - use 2-3 x per day to rinse out nasal passages and keep them moist ((simply saline))       Thank you for visiting the Pulmonary clinic today!   Return to clinic as needed -- can repeat PFTs depending on improvement after thyroid evaluation   Laura Lozada CNP  My office -  [phone]- Barbara is my . Alessandra is my nurse [phone].   Radiology scheduling [phone]   Appointment scheduling [phone]   Pulmonary function testing - [phone]

## 2024-11-18 ENCOUNTER — APPOINTMENT (OUTPATIENT)
Dept: PRIMARY CARE | Facility: CLINIC | Age: 74
End: 2024-11-18
Payer: MEDICARE

## 2024-11-18 ENCOUNTER — LAB (OUTPATIENT)
Dept: LAB | Facility: LAB | Age: 74
End: 2024-11-18
Payer: MEDICARE

## 2024-11-18 VITALS — HEART RATE: 114 BPM | OXYGEN SATURATION: 97 % | WEIGHT: 194 LBS | BODY MASS INDEX: 33.12 KG/M2 | HEIGHT: 64 IN

## 2024-11-18 DIAGNOSIS — E11.9 TYPE 2 DIABETES MELLITUS WITHOUT COMPLICATION, WITHOUT LONG-TERM CURRENT USE OF INSULIN (MULTI): ICD-10-CM

## 2024-11-18 DIAGNOSIS — E04.1 THYROID NODULE: Primary | ICD-10-CM

## 2024-11-18 DIAGNOSIS — E03.9 ACQUIRED HYPOTHYROIDISM: ICD-10-CM

## 2024-11-18 DIAGNOSIS — E04.1 THYROID NODULE: ICD-10-CM

## 2024-11-18 DIAGNOSIS — E78.2 MIXED HYPERLIPIDEMIA: ICD-10-CM

## 2024-11-18 DIAGNOSIS — E07.9 THYROID DISORDER: ICD-10-CM

## 2024-11-18 LAB
T4 FREE SERPL-MCNC: 1.43 NG/DL (ref 0.61–1.12)
TSH SERPL-ACNC: 0.04 MIU/L (ref 0.44–3.98)

## 2024-11-18 PROCEDURE — 1158F ADVNC CARE PLAN TLK DOCD: CPT | Performed by: NURSE PRACTITIONER

## 2024-11-18 PROCEDURE — 99214 OFFICE O/P EST MOD 30 MIN: CPT | Performed by: NURSE PRACTITIONER

## 2024-11-18 PROCEDURE — 36415 COLL VENOUS BLD VENIPUNCTURE: CPT

## 2024-11-18 PROCEDURE — 86800 THYROGLOBULIN ANTIBODY: CPT

## 2024-11-18 PROCEDURE — 84439 ASSAY OF FREE THYROXINE: CPT

## 2024-11-18 PROCEDURE — 3008F BODY MASS INDEX DOCD: CPT | Performed by: NURSE PRACTITIONER

## 2024-11-18 PROCEDURE — 1160F RVW MEDS BY RX/DR IN RCRD: CPT | Performed by: NURSE PRACTITIONER

## 2024-11-18 PROCEDURE — 84481 FREE ASSAY (FT-3): CPT

## 2024-11-18 PROCEDURE — 3061F NEG MICROALBUMINURIA REV: CPT | Performed by: NURSE PRACTITIONER

## 2024-11-18 PROCEDURE — 1123F ACP DISCUSS/DSCN MKR DOCD: CPT | Performed by: NURSE PRACTITIONER

## 2024-11-18 PROCEDURE — 83036 HEMOGLOBIN GLYCOSYLATED A1C: CPT

## 2024-11-18 PROCEDURE — 84443 ASSAY THYROID STIM HORMONE: CPT

## 2024-11-18 PROCEDURE — 1157F ADVNC CARE PLAN IN RCRD: CPT | Performed by: NURSE PRACTITIONER

## 2024-11-18 PROCEDURE — 1159F MED LIST DOCD IN RCRD: CPT | Performed by: NURSE PRACTITIONER

## 2024-11-18 PROCEDURE — 3044F HG A1C LEVEL LT 7.0%: CPT | Performed by: NURSE PRACTITIONER

## 2024-11-18 RX ORDER — LEVOTHYROXINE SODIUM 125 UG/1
TABLET ORAL
Qty: 90 TABLET | Refills: 3 | Status: SHIPPED | OUTPATIENT
Start: 2024-11-18 | End: 2024-11-21 | Stop reason: ALTCHOICE

## 2024-11-18 RX ORDER — ROSUVASTATIN CALCIUM 20 MG/1
20 TABLET, COATED ORAL DAILY
Qty: 90 TABLET | Refills: 3 | Status: SHIPPED | OUTPATIENT
Start: 2024-11-18

## 2024-11-18 RX ORDER — METFORMIN HYDROCHLORIDE 500 MG/1
1000 TABLET ORAL
Qty: 360 TABLET | Refills: 3 | Status: SHIPPED | OUTPATIENT
Start: 2024-11-18

## 2024-11-18 RX ORDER — LIOTHYRONINE SODIUM 5 UG/1
5 TABLET ORAL DAILY
Qty: 90 TABLET | Refills: 3 | Status: SHIPPED | OUTPATIENT
Start: 2024-11-18

## 2024-11-18 NOTE — PROGRESS NOTES
"Subjective   Patient ID: Latosha Larson is a 74 y.o. female who presents for Follow-up (Patient is here today for her 6 month follow up. Patient mentioned she saw her rheumatologist who ordered a U/S of her neck that showed growth and then ordered a CT scan. Patients blood pressure is 154/87).    74 year old female here for follow up  Her rheum retired. Has been monitored for parathyroid nodule  Will be seeing ENT and endo for the parathyroid adenoma  Hypothyroid- on T4 and T3. Last lab 5/2024. These labs were hyperthyroid.   A1C 5./2024- 6.0% on MF   Chronic back pain- DEXA reviewed- osteopenia. Pain in the thoracic spine. Will be seeing endo later this month to discuss  She is concerned with edema in the legs- I explained it is not edema but muscle atrophy, no bulk.            Review of Systems    Objective   Pulse (!) 114   Ht 1.626 m (5' 4\")   Wt 88 kg (194 lb)   LMP  (LMP Unknown)   SpO2 97%   BMI 33.30 kg/m²     Physical Exam  Constitutional:       Appearance: Normal appearance. She is obese.   Musculoskeletal:      Right lower leg: No edema.      Left lower leg: No edema.   Neurological:      Mental Status: She is alert.         Assessment/Plan   Diagnoses and all orders for this visit:  Thyroid nodule  -     Anti-Thyroglobulin Antibody; Future  Type 2 diabetes mellitus without complication, without long-term current use of insulin (Multi)  -     blood sugar diagnostic strip; 1 each once daily. Need appointment for refills  -     metFORMIN (Glucophage) 500 mg tablet; Take 2 tablets (1,000 mg) by mouth 2 times daily (morning and late afternoon).  -     Hemoglobin A1C; Future  Thyroid disorder  -     levothyroxine (Synthroid, Levoxyl) 125 mcg tablet; Mon-Sat no meds on Sun  Acquired hypothyroidism  -     liothyronine (Cytomel) 5 mcg tablet; Take 1 tablet (5 mcg) by mouth once daily.  -     TSH with reflex to Free T4 if abnormal; Future  -     Triiodothyronine, Free; Future  -     Thyroxine, Free; Future  - "     Anti-Thyroglobulin Antibody; Future  Mixed hyperlipidemia  -     rosuvastatin (Crestor) 20 mg tablet; Take 1 tablet (20 mg) by mouth once daily.  Other orders  -     Follow Up In Primary Care - Established  -     Follow Up In Primary Care - Medicare Annual; Future

## 2024-11-19 LAB
EST. AVERAGE GLUCOSE BLD GHB EST-MCNC: 111 MG/DL
HBA1C MFR BLD: 5.5 %
T3FREE SERPL-MCNC: 3.4 PG/ML (ref 2.3–4.2)

## 2024-11-20 LAB — THYROGLOB AB SERPL-ACNC: 1424.5 IU/ML (ref 0–4)

## 2024-11-21 DIAGNOSIS — E03.9 ACQUIRED HYPOTHYROIDISM: Primary | ICD-10-CM

## 2024-11-21 RX ORDER — LEVOTHYROXINE SODIUM 88 UG/1
88 TABLET ORAL DAILY
Qty: 30 TABLET | Refills: 11 | Status: SHIPPED | OUTPATIENT
Start: 2024-11-21 | End: 2025-11-21

## 2024-11-25 ENCOUNTER — APPOINTMENT (OUTPATIENT)
Dept: RADIOLOGY | Facility: CLINIC | Age: 74
End: 2024-11-25
Payer: MEDICARE

## 2024-11-26 ENCOUNTER — APPOINTMENT (OUTPATIENT)
Dept: ENDOCRINOLOGY | Facility: CLINIC | Age: 74
End: 2024-11-26
Payer: MEDICARE

## 2024-11-26 VITALS
HEART RATE: 89 BPM | WEIGHT: 194 LBS | DIASTOLIC BLOOD PRESSURE: 98 MMHG | SYSTOLIC BLOOD PRESSURE: 143 MMHG | HEIGHT: 64 IN | BODY MASS INDEX: 33.12 KG/M2

## 2024-11-26 DIAGNOSIS — D49.9 TUMOR: ICD-10-CM

## 2024-11-26 DIAGNOSIS — E06.3 HYPOTHYROIDISM DUE TO HASHIMOTO THYROIDITIS: Primary | ICD-10-CM

## 2024-11-26 DIAGNOSIS — E83.52 HYPERCALCEMIA: ICD-10-CM

## 2024-11-26 PROCEDURE — 3061F NEG MICROALBUMINURIA REV: CPT | Performed by: INTERNAL MEDICINE

## 2024-11-26 PROCEDURE — 3077F SYST BP >= 140 MM HG: CPT | Performed by: INTERNAL MEDICINE

## 2024-11-26 PROCEDURE — 1157F ADVNC CARE PLAN IN RCRD: CPT | Performed by: INTERNAL MEDICINE

## 2024-11-26 PROCEDURE — 99204 OFFICE O/P NEW MOD 45 MIN: CPT | Performed by: INTERNAL MEDICINE

## 2024-11-26 PROCEDURE — 1123F ACP DISCUSS/DSCN MKR DOCD: CPT | Performed by: INTERNAL MEDICINE

## 2024-11-26 PROCEDURE — 3044F HG A1C LEVEL LT 7.0%: CPT | Performed by: INTERNAL MEDICINE

## 2024-11-26 PROCEDURE — 3080F DIAST BP >= 90 MM HG: CPT | Performed by: INTERNAL MEDICINE

## 2024-11-26 PROCEDURE — 1159F MED LIST DOCD IN RCRD: CPT | Performed by: INTERNAL MEDICINE

## 2024-11-26 PROCEDURE — 3008F BODY MASS INDEX DOCD: CPT | Performed by: INTERNAL MEDICINE

## 2024-11-26 ASSESSMENT — PATIENT HEALTH QUESTIONNAIRE - PHQ9
2. FEELING DOWN, DEPRESSED OR HOPELESS: NOT AT ALL
1. LITTLE INTEREST OR PLEASURE IN DOING THINGS: SEVERAL DAYS
10. IF YOU CHECKED OFF ANY PROBLEMS, HOW DIFFICULT HAVE THESE PROBLEMS MADE IT FOR YOU TO DO YOUR WORK, TAKE CARE OF THINGS AT HOME, OR GET ALONG WITH OTHER PEOPLE: NOT DIFFICULT AT ALL
SUM OF ALL RESPONSES TO PHQ9 QUESTIONS 1 & 2: 1

## 2024-11-26 ASSESSMENT — ENCOUNTER SYMPTOMS
OCCASIONAL FEELINGS OF UNSTEADINESS: 0
DEPRESSION: 0
LOSS OF SENSATION IN FEET: 0

## 2024-11-26 NOTE — PROGRESS NOTES
"UPDATE/ADDENDUM: Blood work and urine test were reviewed. Consistent with Familial hypocalciuric hypercalcemia (FHH). No concern for hyperparathyroidism. TFTs orders placed to be done in First week of Jan 2025. Will discuss treatment of osteoporosis on her next visit (Plan for after Jan).       Reason for visit: Evaluation for osteoporosis, hypercalcinemia and abnormal thyroid labs     HPI:  Ms. Larson is a 74-year-old female with history of DM2, obesity, GERD, May Thurner syndrome, history of DVT, degenerative joint disease, primary hypothyroidism, history of bladder CA s/p tumor resection and BCG who presents for evaluation of osteoporosis, hypercalcemia, and abnormal thyroid labs.    Pertinent history:  -Patient reports that she was following with her rheumatologist for knee osteoarthritis.  Her rheumatologist reviewed her DEXA scan from December 2023 which showed osteopenia with worst T-score of -2.1 at the left femur.  Her rheumatologist noted that the patient has had previous silent vertebral fractures therefore he diagnosed her with osteoporosis  -Workup was obtained and showed calcium of 10.9 with an albumin of 4.7 (corrected calcium 10.3) with a PTH of 188 in June 2024 and vitamin D of 42 in May 2024.    -This was followed up by a neck ultrasound in July 2024 to assess for parathyroid adenoma, the ultrasound showed 3.7 \"thyroid\" nodule that was characterized as TI-RADS 4   -CT neck with contrast was obtained in August 2024 which showed 2.4 cm heterogeneous oval mass at the lateral border of the thyroid that may represent parathyroid adenoma   -Of note, she also had 24-hour urine calcium collected which showed 111 mg/ 24-hour.  With 0.73 g /24 h.  No chemistry was obtained simultaneously and no urine sodium or phosphate were checked on that specimen  -She complains of back pain and reports height loss (previously 5'6, now 5'4).  She reports a history of ankle fracture in September 2023 after tripping  -She " has some gait imbalance but does not use any assisted devices such as a cane to ambulate, she denies other history of known fractures  -She is a former smoker with significant tobacco use of over 25-pack-year  -Denies history of renal stones  -He is taking vitamin D supplementation    With regards of her hypothyroidism:  - She reports that she was diagnosed with primary hypothyroidism over 20 years ago and was started on levothyroxine supplementation then.  She does not recall her doses in the past.    -More recently in January 2024 she was started on T3 on top of her T4 by her nurse practitioner primary provider.  Subsequently labs in February 2024 showed significant hypothyroidism with a TSH of 62 patient reports that at that point she was not taking her T4 as she thought that the T3 was replacing her T4.  She had repeat TFTs checked in May 2024 and November 2024 both of which showed hyperthyroidism with a low TSH and high free T4.  More recently she reports that her free T4 dose was decreased to 88 mcg daily from 125 6 tablets a week    In terms of ROS:  -She endorses fatigue and low energy  -Reports poor sleep: Goes to bed at 10 PM, falls asleep at 1:30 AM, wakes up spontaneously or to urinate and has trouble falling back asleep  -Reports good appetite and states that she has been making an effort to keep her weight under 200 pounds by watching her diet  -Denies significant palpitations and report 1 episode over the past few months, states that she was worked up in the past (5 to 6 years ago) for arrhythmia with a monitor but workup was unrevealing  -Denies shortness of breath but states that she has not been active  -Reports occasional chest pain that she associates to not taking her PPI  -Reports fluctuations in her habits that she could have 1-4 bowel movements a day, not formed, which has been the case for years now  -States that she only has tremors of her blood sugars low (although the patient is on  "metformin only)  -Endorses hoarseness of voice associated with having phlegm  -Endorses poor vision especially in the morning (\"not sharp\") that later clears up as well as gritty sensation in her eyes for which she uses eyedrops  -Denies night sweats  -Endorses both heat and cold intolerance.  Reports that she keeps the window of her apartment cracked open and the fan on has a temperature in her apartment is not regulated  -Denies muscle cramps  -Denies nausea vomiting or abdominal pain  -Denies exposure to radiation outside of medical testing  -Denies family history of thyroid cancer    Of note, she was supposed to follow-up with her urologist in January 2025 however more recently she had a bleeding that she initially thought was vaginal but she was evaluated by her gynecologist who had a testing that showed hematuria therefore she will be following up with her urologist soon    She also has an appointment with ENT for evaluation of of that neck mass/nodule seen on ultrasound and CT    Past Medical History:  DM2 on metformin  Hypothyroidism   Bladder cancer s/p surgical removal and BCG (Now having hematuria)  Hemorrhoid  Lung nodules   May Thunuer   Mood disorder       She has a past medical history of Allergic rhinitis (07/03/2023), Ankle fracture, left (07/03/2023), Body mass index (BMI) 33.0-33.9, adult (06/21/2021), Bursitis of right knee (10/08/2023), Cardiomegaly, Chronic back pain, Class 2 obesity with body mass index (BMI) of 35.0 to 35.9 in adult (02/27/2024), Closed dislocation of ankle (10/08/2023), Complement 4 deficiency (Multi), Compression of vein (07/03/2023), Depression, Diabetes mellitus (Multi), DJD (degenerative joint disease), DVT (deep venous thrombosis) (Multi), Dysphagia (07/03/2023), History of blood transfusion, HLD (hyperlipidemia), HTN (hypertension), Hypercalcemia (10/08/2023), Hypothyroidism, IBS (irritable bowel syndrome), Kidney stone (10/08/2023), Lactic acidosis (10/08/2023), Lower " leg edema (10/08/2023), Lumbar spondylosis, Lung nodules, Macular degeneration, Malignant neoplasm of bladder, May-Thurner syndrome, OA (osteoarthritis), Other amnesia (2022), Other forms of dyspnea (2022), Palpitations (2023), PE (pulmonary thromboembolism) (Multi), Peptic ulcer (07/10/2008), Right upper quadrant pain (2021), Sepsis (Multi) (10/08/2023), Vitamin D deficiency, and Wears hearing aid in both ears.    Surgical History  She has a past surgical history that includes  section, low transverse (2020); Bladder tumor excision (2022); CT angio abdomen pelvis w and or wo IV IV contrast (2020); Cystoscopy; ORIF ankle fracture (Left); IR intervention filter placement; IVC filter retrieval; Cervical biopsy w/ loop electrode excision; Varicose vein surgery; Balloon angioplasty, artery; and Cataract extraction w/  intraocular lens implant (Bilateral).     Social History  She reports that she quit smoking about 32 years ago. Her smoking use included cigarettes. She has never used smokeless tobacco. She reports current alcohol use. She reports that she does not use drugs.    Family History  Family History   Problem Relation Name Age of Onset    Lupus Mother      Heart disease Mother      Heart disease Father      Cancer Father      Breast cancer Paternal Grandmother       Meds:  Current Outpatient Medications   Medication Instructions    betamethasone dipropionate (Diprosone) 0.05 % lotion once daily.    blood sugar diagnostic strip 1 each, miscellaneous, Daily, Need appointment for refills    cholecalciferol (VITAMIN D-3) 125 mcg, Daily    cyanocobalamin (VITAMIN B-12) 100 mcg, Daily    Eliquis 5 mg, oral, 2 times daily    levothyroxine (SYNTHROID, LEVOXYL) 88 mcg, oral, Daily, Take on an empty stomach at the same time each day, either 30 to 60 minutes prior to breakfast    liothyronine (CYTOMEL) 5 mcg, oral, Daily    metFORMIN (GLUCOPHAGE) 1,000 mg, oral, 2 times  "daily (morning and late afternoon)    omega 3-dha-epa-fish oil (Fish OiL) 1,200 (144-216) mg capsule 1 capsule, Daily    pantoprazole (PROTONIX) 40 mg, Daily before breakfast    rosuvastatin (CRESTOR) 20 mg, oral, Daily       Objective   Last Recorded Vitals  Blood pressure (!) 143/98, pulse 89, height 1.626 m (5' 4\"), weight 88 kg (194 lb).  Constitutional: Elderly  female, NAD, well groomed. AOx3. Cooperative  Skin/Hair: Warm, dry skin.  HEENT: EOMI, Anicteric scleras, No lid lag or lid retraction, No TTP of ocular globes. Dry oral mucosa.  Negative Chvostek sign   Neck: Soft, supple. Non tender, full ROM, no lymphadenopathy. Thyroid gland palpation: non nodular  Cardiovascular: Normal heart rate  Respiratory: No increased work of breathing  Neuro: Moving all extremities spontaneously. CN's grossly intact. No balance or gait disturbances. DTRs: Somewhat brisk  Lab Results   Component Value Date    TSH 0.04 (L) 11/18/2024    TSH 0.17 (L) 05/14/2024    TSH 62.00 (H) 02/27/2024    TSH 5.08 (H) 11/30/2023    TSH 0.27 (L) 07/11/2023    TSH 0.16 (L) 06/30/2022    TSH 0.12 (L) 01/31/2022    TSH 0.77 06/29/2021    TSH 0.42 04/29/2021    TSH 0.74 04/10/2020    FREET4 1.43 (H) 11/18/2024    FREET4 1.45 (H) 05/14/2024    FREET4 0.33 (L) 02/27/2024    FREET4 1.20 11/30/2023    FREET4 1.68 (H) 07/11/2023    FREET4 1.02 06/30/2022    FREET4 2.1 (H) 01/31/2022    FREET4 1.36 06/29/2021    FREET4 2.4 (H) 01/24/2020    FREET4 1.3 09/28/2019    THYROIDPAB  09/13/2018     <1.0  Reference range: <5.6  Unit: IU/mL  Performed at the [facility] unless   otherwise noted.      THYROGLOBULI 1424.5 (H) 11/18/2024       Lab Results   Component Value Date    ANIONGAP 17 10/04/2024    BUN 24 (H) 10/04/2024    CO2 21 10/04/2024    CREATININE 1.12 (H) 10/04/2024    K 4.1 10/04/2024     10/04/2024     10/04/2024    PHOS 2.7 06/13/2024    GLUCOSE 142 (H) 10/04/2024    CALCIUM 11.0 (H) 10/04/2024    " EGFR 52 (L) 10/04/2024    ALBUMIN 4.4 10/04/2024    CAION 1.38 (H) 08/05/2024    .4 (H) 06/13/2024    VITD25 42 05/14/2024     Thyroid US 7/2024:  TECHNIQUE:  Routine ultrasound of the thyroid was performed. Static images were  obtained for remote interpretation.    FINDINGS:  No definitive parathyroid lesion.    RIGHT THYROID LOBE:  Right thyroid lobe: 2.8 x 0.9 x 1.2 cm. Heterogeneous atrophic gland    LEFT THYROID LOBE:  Left thyroid lobe: 2.9 x 1.3 x 2.2 cm. Cm. Heterogeneous atrophic  gland. There is a 2.8 x 1.2 x 3.7 cm nodule seen in the lateral  thyroid gland. No suspicious microcalcification. The border is smooth.    ISTHMUS:  Size: 2 cm.    CERVICAL LYMPHADENOPATHY: None    IMPRESSION:  Parathyroid adenoma not definitively seen by ultrasound. Examination  limited by patient position.. There is a hypoechoic left thyroid  nodule and presumably a TR 4 lesion. If this has not been previously  evaluated, FNA or follow-up is advised    Persistent concern for parathyroid lesion, contrast-enhanced neck CT  may be of diagnostic benefit to better evaluate the anatomy     CT neck w/ contrast 8/2024:  Thyroid gland: Assessment for parathyroid adenoma is suboptimal on  this monophasic examination. There is a 2.4 x 1.1 x 1.4 cm  heterogeneous oval mass seen medial to the left common carotid artery  and lateral to the presumed border of the thyroid (axial series 4,  image 52, series 6, image 50) that also appears slightly  hyperenhancing relative to the thyroid, potentially representing a  parathyroid adenoma. The thyroid appears nonenlarged with additional  subcentimeter nodules.     Bone Scan 12/2023:  TECHNIQUE:  DEXA BONE DENSITY AXIAL SKELETON W VFA      FINDINGS:  SPINE L1-L4  Bone Mineral Density: 1.033 g/cm2  T-Score -0.1  Z-Score 2.1          LEFT FEMUR -TOTAL  Bone Mineral Density: 0.844 g/cm2  T-Score -0.8   Z-Score  0.9          LEFT FEMUR -NECK  Bone Mineral Density: 0.611 g/cm2  T-Score -2.1  Z-Score  -0.2          World Health Organization (WHO) criteria for post-menopausal,   Women:  Normal:         T-score at or above -1 SD  Osteopenia:   T-score between -1 and -2.5 SD  Osteoporosis: T-score at or below -2.5 SD          10-year Fracture Risk(1):  Major Osteoporotic Fracture  12%  Hip Fracture                        2.9%      Note:  If no FRAX score is reported, it is because:  Some T-score for Spine Total or Hip Total or Femoral Neck at or below  -2.5      Vertebral Deformity Assessment: Exam Date - 12/7/2023 4:12 pm  -----------------------------------------------------------------  Vertebral Level                      Impression  -----------------------------------------------------------------  T4                                        Not reported  T5                                        Moderate Wedge Deformity  T6                                        Mild Wedge Deformity  T7                                        Normal  T8                                        Normal  T9                                        Mild Wedge Deformity  T10                                      Mild Wedge Deformity  T11                                      Normal  T12                                      Mild Biconcave Deformity  L1                                        Normal  L2                                        Normal  L3                                        Normal  L4                                        Normal  -----------------------------------------------------------------  A spine fracture indicates 5X risk for subsequent spine fracture  and 2X risk for subsequent hip fracture.      This exam was performed at [facility] on a [facility] Dexa Unit.      IMPRESSION:  DEXA:  According to World Health Organization criteria,  classification is low bone mass (osteopenia)      Followup recommended in two years or sooner as clinically warranted.      VFA:  NO VERTEBRAL FRACTURES  WERE SEEN.      All images and detailed analysis are available on the  Radiology  PACS.      MACRO:  None    Assessment/Plan   74-year-old female with primary hypothyroidism, hypercalcemia, osteoporosis and neck mass/nodule who presents for evaluation    With regards of her hypothyroidism: It appears to be overtreated.  She is clinically and biochemically hyperthyroid which may explain some of her symptoms of fatigue and poor sleep.  On review of her previous TFTs it appears that her hypothyroidism has not been stable since at least January 2022 with her TSH is fluctuating indicating either overtreatment or under treatment.  She has significantly positive antithyroglobulin antibody which is consistent with her history of primary hypothyroidism.  -Recommend stopping T3 supplementation  -Taking levothyroxine 88 mcg 1+1/2 tablets 4 days a week (until she runs out of her 88 mcg supply but will likely switch to 100 mcg daily depending on her blood work)    With regards of her hypercalcemia: This is at least in part related to having biochemical hyperthyroidism, she does appear to have prior history of elevated/high normal serum calcium dating back to at least 2019.  She did have a serum PTH done in 2019 that was 44 with a corrected calcium of 11.1.  Her corrected calcium in June 2024 was 10.3 with a PTH of 188, 24-hour urine calcium was 111 which is suspicious for familial hypercalcemic hypocalciuria vs primary hyperpara however no simultaneous serum chemistry was obtained at the time of urine testing  -Obtain 24-hour urine calcium, sodium, phosphate, and creatinine  -Obtain serum PTH, RFP on the day of the urine sample submission    With regards of osteoporosis: will address patient's osteoporosis once the above results are available    With regards of her neck mass/nodule: This was seen on ultrasound obtained to evaluate for parathyroid adenoma and on CT scan obtained subsequently.  CT scan images were reviewed and  it does not appear that the mass attached to /is a part of the thyroid, its appearance is also unusual for parathyroid adenoma.  Potentially it may represent a paraganglioma?  -Obtain plasma metanephrine and catecholamines    RTC to be determined based on results    Case seen, examined, and discussed with Dr. Yeboah

## 2024-11-26 NOTE — PATIENT INSTRUCTIONS
Take levothyroxine 88 mcg 1 and 1/2 tablet 4 days a week  Obtain 24 hours urine and get labs the same day that you submit you urine sample

## 2024-11-29 ENCOUNTER — LAB (OUTPATIENT)
Dept: LAB | Facility: LAB | Age: 74
End: 2024-11-29
Payer: MEDICARE

## 2024-11-29 DIAGNOSIS — D49.9 TUMOR: ICD-10-CM

## 2024-11-29 DIAGNOSIS — E83.52 HYPERCALCEMIA: ICD-10-CM

## 2024-11-29 DIAGNOSIS — E06.3 HYPOTHYROIDISM DUE TO HASHIMOTO THYROIDITIS: ICD-10-CM

## 2024-11-29 LAB
ALBUMIN SERPL BCP-MCNC: 4.3 G/DL (ref 3.4–5)
ANION GAP SERPL CALC-SCNC: 13 MMOL/L (ref 10–20)
BUN SERPL-MCNC: 19 MG/DL (ref 6–23)
CALCIUM (MG/L) IN 24 HOUR URINE: 34 MG/24H (ref 100–300)
CALCIUM 24H UR-MRATE: 1.9 MG/DL
CALCIUM SERPL-MCNC: 10.7 MG/DL (ref 8.6–10.6)
CHLORIDE SERPL-SCNC: 108 MMOL/L (ref 98–107)
CO2 SERPL-SCNC: 22 MMOL/L (ref 21–32)
COLLECT DURATION TIME SPEC: 24 HRS
CREAT 24H UR-MCNC: 39.5 MG/DL (ref 20–320)
CREAT 24H UR-MCNC: 39.5 MG/DL (ref 20–320)
CREAT 24H UR-MCNC: 39.6 MG/DL (ref 20–320)
CREAT 24H UR-MCNC: 39.6 MG/DL (ref 20–320)
CREAT 24H UR-MRATE: 0.71 G/24 H (ref 0.67–1.59)
CREAT SERPL-MCNC: 0.97 MG/DL (ref 0.5–1.05)
EGFRCR SERPLBLD CKD-EPI 2021: 62 ML/MIN/1.73M*2
GLUCOSE SERPL-MCNC: 136 MG/DL (ref 74–99)
PHOSPHATE 24H UR-MRATE: 18 MG/DL
PHOSPHATE SERPL-MCNC: 3.1 MG/DL (ref 2.5–4.9)
PHOSPHORUS 24 HOUR URINE: 324 MG/24H (ref 400–1300)
POTASSIUM SERPL-SCNC: 4.4 MMOL/L (ref 3.5–5.3)
PTH-INTACT SERPL-MCNC: 198.1 PG/ML (ref 18.5–88)
SODIUM 24H UR-SCNC: 53 MMOL/L
SODIUM 24H UR-SRATE: 95 MMOL/24 H (ref 80–220)
SODIUM SERPL-SCNC: 139 MMOL/L (ref 136–145)
SPECIMEN VOL 24H UR: 1800 ML

## 2024-11-29 PROCEDURE — 84300 ASSAY OF URINE SODIUM: CPT

## 2024-11-29 PROCEDURE — 84105 ASSAY OF URINE PHOSPHORUS: CPT

## 2024-11-29 PROCEDURE — 80069 RENAL FUNCTION PANEL: CPT

## 2024-11-29 PROCEDURE — 81050 URINALYSIS VOLUME MEASURE: CPT

## 2024-11-29 PROCEDURE — 83970 ASSAY OF PARATHORMONE: CPT

## 2024-11-29 PROCEDURE — 82570 ASSAY OF URINE CREATININE: CPT

## 2024-11-29 PROCEDURE — 82340 ASSAY OF CALCIUM IN URINE: CPT

## 2024-12-03 LAB
ANNOTATION COMMENT IMP: NORMAL
METANEPHS SERPL-SCNC: 0.17 NMOL/L (ref 0–0.49)
NORMETANEPH FREE SERPL-SCNC: 0.69 NMOL/L (ref 0–0.89)

## 2024-12-03 NOTE — PROGRESS NOTES
PROCEDURE NOTE:    PREOPERATIVE DIAGNOSIS:  Bladder Cancer NMIBC    POSTOPERATIVE DIAGNOSIS:  Same    OPERATION:  Flexible Cystourethroscopy      SURGEON:  Stefany Phipps MD    ANESTHESIA:  2%  lidocaine jelly    COMPLICATIONS:  None    EBL: Minimal    SPECIMEN:  Voided urine was not collected and submitted for cytology.    DISPOSITION:  The patient was discharged home after the procedure, per routine.    INDICATIONS: :  Ms. Larson is a 74 y.o. patient with a history of Bladder Cancer NMIBC who presents today for Cystoscopy.     The indications, risks and benefits of this procedure were discussed with the patient, consent was obtained prior to the procedure, and to the best of my judgement the patient seemed to understand and agree to the procedure.    PROCEDURE:  The patient  was brought into the procedure suite and informed consent was reviewed and confirmed. Vital signs were obtained prior to the procedure: LMP  (LMP Unknown) .  The patient was escorted onto the stretcher, placed supine, prepped with betadine and draped in the usual standard surgical fashion.  Intraurethral 2% viscous lidocaine jelly was used for local analgesia.  A 16 M[place] flexible cystourethroscope was inserted into the urethra.   The urethra was normal.  Upon entering the bladder the entire bladder was surveyed in a 360 degree fashion.  The left and right ureteral orifices were in normal orthotopic position effluxing clear yellow urine, bilaterally.   There was no foreign objects or stones. There was a 2.5cm papillary tumor over the dome of the bladder. The cystoscope was then retroflexed.  The bladder neck was then further examined without any evidence of lesions. The scope was then removed and in an antegrade fashion, the urethra and bladder were again resurveyed with no evidence of additional lesions.  The cystoscope was then fully removed.   The patient tolerated the procedure well.  Vitals were stable after the procedure.  The  patient was able to void and was discharged home.  Verbal and written Post procedure instructions were reviewed with the patient.    IMPRESSION:  2.5 cm papillary tumor on the dome     PLAN:  CT Urogram  TURBT in the next 1-2 months    Subjective   Patient ID: Latosha Larson is a 74 y.o. female.      HPI  74 y.o. female presents with history of recurrent LG NMIBC. She underwent a cystoscopy today in office. No complications. She is status post cystoscopy with transurethral resection of a tumor on 3/5/2024, which demonstrated benign pathology.     Review of Systems  A complete review of systems was performed. All systems are noted to be negative unless indicated in the history of present illness, impression, active problem list, or past histories.     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 female presents with history of recurrent LG NMIBC. She underwent a cystoscopy today in office. No complications. She is status post cystoscopy with transurethral resection of a tumor on 3/5/2024, which demonstrated benign pathology.     During her cystoscopy a 2.5 cm papillary tumor was identified on the dome.     I explained to the patient that bladder masses are usually malignant, however can be either low grade or high grade, and can be non-muscle invasive or muscle-invasive. The definitive treatment will depend on the grade and stage, which will be revealed by a diagnostic TURBT. I explained to the patient how this is done, and the possibility of requiring a Busch catheter following the procedure, without the need for admitting to the hospital. Following the initial TURBT, if high grade tumor that is not muscle-invasive is diagnosed, then the treatment will involve BCG, however if it is muscle-invasive, then more advanced treatments such as radical cystectomy is required.     The patient understands and would  like to proceed with the plan. A CT urogram will be preformed prior to the procedure.     Plan:  CT Urogram   TURBT to be preformed in the next 1-2 months    Scribe Attestation   By signing my name below, I, Arsh Saleem, Scribe attestation that this documentation has been prepared under the direction and in the presence of Stefany Phipps MD.

## 2024-12-04 ENCOUNTER — APPOINTMENT (OUTPATIENT)
Dept: UROLOGY | Facility: CLINIC | Age: 74
End: 2024-12-04
Payer: MEDICARE

## 2024-12-04 DIAGNOSIS — C67.1 MALIGNANT NEOPLASM OF DOME OF URINARY BLADDER (MULTI): Primary | ICD-10-CM

## 2024-12-04 LAB
CATECHOLS UR-IMP: NORMAL
COLLECT DURATION TIME SPEC: 24 HR
COLLECT DURATION TIME SPEC: 24 HR
CREAT 24H UR-MRATE: 756 MG/D (ref 500–1400)
CREAT 24H UR-MRATE: 756 MG/D (ref 500–1400)
CREAT UR-MCNC: 42 MG/DL
CREAT UR-MCNC: 42 MG/DL
DOPAMINE 24H UR-MRATE: 72 UG/D (ref 71–485)
DOPAMINE UR-MCNC: 40 UG/L
DOPAMINE/CREAT UR: 95 UG/G CRT (ref 0–250)
EPINEPH 24H UR-MRATE: 2 UG/D (ref 1–14)
EPINEPH UR-MCNC: 1 UG/L
EPINEPH/CREAT UR: 2 UG/G CRT (ref 0–20)
METANEPH 24H UR-MCNC: 61 UG/L
METANEPH 24H UR-MRATE: 110 UG/D (ref 36–229)
METANEPH+NORMETANEPH UR-IMP: ABNORMAL
METANEPH/CREAT 24H UR: 145 UG/G CRT (ref 0–300)
NOREPINEPH 24H UR-MRATE: 20 UG/D (ref 14–120)
NOREPINEPH UR-MCNC: 11 UG/L
NOREPINEPH/CREAT UR: 26 UG/G CRT (ref 0–45)
NORMETANEPHRINE 24H UR-MCNC: 170 UG/L
NORMETANEPHRINE 24H UR-MRATE: 306 UG/D (ref 95–650)
NORMETANEPHRINE/CREAT 24H UR: 405 UG/G CRT (ref 0–400)
POC APPEARANCE, URINE: CLEAR
POC BILIRUBIN, URINE: NEGATIVE
POC BLOOD, URINE: NEGATIVE
POC COLOR, URINE: YELLOW
POC GLUCOSE, URINE: NEGATIVE MG/DL
POC KETONES, URINE: NEGATIVE MG/DL
POC LEUKOCYTES, URINE: NEGATIVE
POC NITRITE,URINE: NEGATIVE
POC PH, URINE: 5.5 PH
POC PROTEIN, URINE: NEGATIVE MG/DL
POC SPECIFIC GRAVITY, URINE: >=1.03
POC UROBILINOGEN, URINE: 0.2 EU/DL
SPECIMEN VOL ?TM UR: 1800 ML
SPECIMEN VOL ?TM UR: 1800 ML

## 2024-12-04 PROCEDURE — 99214 OFFICE O/P EST MOD 30 MIN: CPT | Performed by: STUDENT IN AN ORGANIZED HEALTH CARE EDUCATION/TRAINING PROGRAM

## 2024-12-04 PROCEDURE — 52000 CYSTOURETHROSCOPY: CPT | Performed by: STUDENT IN AN ORGANIZED HEALTH CARE EDUCATION/TRAINING PROGRAM

## 2024-12-05 LAB
DOPAMINE SERPL-MCNC: <30 PG/ML (ref 0–48)
EPINEPH PLAS-MCNC: 37 PG/ML (ref 0–62)
NOREPINEPH PLAS-MCNC: 948 PG/ML (ref 0–874)

## 2024-12-09 ENCOUNTER — APPOINTMENT (OUTPATIENT)
Dept: OTOLARYNGOLOGY | Facility: CLINIC | Age: 74
End: 2024-12-09
Payer: MEDICARE

## 2024-12-09 VITALS — HEIGHT: 64 IN | BODY MASS INDEX: 33.12 KG/M2 | WEIGHT: 194 LBS | TEMPERATURE: 98.1 F

## 2024-12-09 DIAGNOSIS — E04.1 THYROID NODULE INCIDENTALLY NOTED ON IMAGING STUDY: ICD-10-CM

## 2024-12-09 DIAGNOSIS — E21.2 OTHER HYPERPARATHYROIDISM (MULTI): Primary | ICD-10-CM

## 2024-12-09 DIAGNOSIS — E83.52 HYPERCALCEMIA: ICD-10-CM

## 2024-12-09 DIAGNOSIS — E83.52 FHH (FAMILIAL HYPOCALCIURIC HYPERCALCEMIA): ICD-10-CM

## 2024-12-09 DIAGNOSIS — C67.9 MALIGNANT NEOPLASM OF URINARY BLADDER, UNSPECIFIED SITE (MULTI): ICD-10-CM

## 2024-12-09 DIAGNOSIS — R49.0 HOARSENESS OF VOICE: ICD-10-CM

## 2024-12-09 PROCEDURE — 99204 OFFICE O/P NEW MOD 45 MIN: CPT | Performed by: OTOLARYNGOLOGY

## 2024-12-09 PROCEDURE — 3061F NEG MICROALBUMINURIA REV: CPT | Performed by: OTOLARYNGOLOGY

## 2024-12-09 PROCEDURE — 1159F MED LIST DOCD IN RCRD: CPT | Performed by: OTOLARYNGOLOGY

## 2024-12-09 PROCEDURE — 31575 DIAGNOSTIC LARYNGOSCOPY: CPT | Performed by: OTOLARYNGOLOGY

## 2024-12-09 PROCEDURE — 1157F ADVNC CARE PLAN IN RCRD: CPT | Performed by: OTOLARYNGOLOGY

## 2024-12-09 PROCEDURE — 3008F BODY MASS INDEX DOCD: CPT | Performed by: OTOLARYNGOLOGY

## 2024-12-09 PROCEDURE — 3044F HG A1C LEVEL LT 7.0%: CPT | Performed by: OTOLARYNGOLOGY

## 2024-12-09 PROCEDURE — 1123F ACP DISCUSS/DSCN MKR DOCD: CPT | Performed by: OTOLARYNGOLOGY

## 2024-12-09 PROCEDURE — 1036F TOBACCO NON-USER: CPT | Performed by: OTOLARYNGOLOGY

## 2024-12-09 NOTE — PROGRESS NOTES
Chief Complaint   Patient presents with    New Patient Visit     LOV 4/2019 THYROID & PTH CONSULT, U/S & CT IN CHART, SEEN ENDOCRINOLOGIST 2 WKS AGO      Date of Evaluation: 12/9/2024   CONOR Larson is a 74 y.o. female with hypercalcemia.  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  Bone density study shows osteopenia.  She complains of excessive fatigue.  She has some hoarseness.  She denies dysphagia odynophagia weight loss and otalgia.  Parathyroid hormone 198.  She has osteopenia on bone density study.  A thyroid ultrasound shows July 11, 2024:  Right lobe 2.8 cm, left lobe 2.9 cm.  Left 2.8 x 1.2 x 3.7 cm nodule T RADS 4 in the lateral thyroid    CT scan of neck August 2024 shows:  Assessment for parathyroid adenoma is suboptimal on  this monophasic examination. There is a 2.4 x 1.1 x 1.4 cm  heterogeneous oval mass seen medial to the left common carotid artery  and lateral to the presumed border of the thyroid (axial series 4,  image 52, series 6, image 50) that also appears slightly  hyperenhancing relative to the thyroid, potentially representing a  parathyroid adenoma. The thyroid appears nonenlarged with additional  subcentimeter nodules.         Past Medical History:   Diagnosis Date    Allergic rhinitis 07/03/2023    Ankle fracture, left 07/03/2023    Body mass index (BMI) 33.0-33.9, adult 06/21/2021    Body mass index (BMI) of 33.0 to 33.9 in adult    Bursitis of right knee 10/08/2023    Cardiomegaly     Chronic back pain     Class 2 obesity with body mass index (BMI) of 35.0 to 35.9 in adult 02/27/2024    35.31 kg/m²    Closed dislocation of ankle 10/08/2023    Complement 4 deficiency (Multi)     denies    Compression of vein 07/03/2023    Depression     Diabetes mellitus (Multi)     Type II    DJD (degenerative joint disease)     DVT (deep venous thrombosis) (Multi)     2020  left leg    Dysphagia 07/03/2023    History of blood  transfusion       after surgery    HLD (hyperlipidemia)     HTN (hypertension)     denies    Hypercalcemia 10/08/2023    Hypothyroidism     IBS (irritable bowel syndrome)     Kidney stone 10/08/2023    Lactic acidosis 10/08/2023    Lower leg edema 10/08/2023    Lumbar spondylosis     Lung nodules     multiple    Macular degeneration     Malignant neoplasm of bladder     May-Thurner syndrome     OA (osteoarthritis)     Other amnesia 2022    Memory changes    Other forms of dyspnea 2022    AREVALO (dyspnea on exertion)    Palpitations 2023    PE (pulmonary thromboembolism) (Multi)         Peptic ulcer 07/10/2008    Right upper quadrant pain 2021    Abdominal pain, RUQ    Sepsis (Multi) 10/08/2023    Vitamin D deficiency     Wears hearing aid in both ears       Past Surgical History:   Procedure Laterality Date    BALLOON ANGIOPLASTY, ARTERY      iliac artery stent LLE    BLADDER TUMOR EXCISION  2022    Transurethral resection of bladder tumor    CATARACT EXTRACTION W/  INTRAOCULAR LENS IMPLANT Bilateral     Dr Alfonso    CERVICAL BIOPSY  W/ LOOP ELECTRODE EXCISION      cone biopsy     SECTION, LOW TRANSVERSE  2020     section    CT ABDOMEN PELVIS ANGIOGRAM W AND/OR WO IV CONTRAST  2020    CT ABDOMEN PELVIS ANGIOGRAM W AND/OR WO IV CONTRAST [facility]    CYSTOSCOPY      with TURBT  multiple    IR INTERVENTION FILTER PLACEMENT      IVC FILTER RETRIEVAL      ORIF ANKLE FRACTURE Left     VARICOSE VEIN SURGERY      varicose vein ligation          Medications:   Current Outpatient Medications   Medication Instructions    betamethasone dipropionate (Diprosone) 0.05 % lotion once daily.    blood sugar diagnostic strip 1 each, miscellaneous, Daily, Need appointment for refills    cholecalciferol (VITAMIN D-3) 125 mcg, Daily    cyanocobalamin (VITAMIN B-12) 100 mcg, Daily    Eliquis 5 mg, oral, 2 times daily    levothyroxine (SYNTHROID, LEVOXYL) 88 mcg, oral,  "Daily, Take on an empty stomach at the same time each day, either 30 to 60 minutes prior to breakfast    liothyronine (CYTOMEL) 5 mcg, oral, Daily    metFORMIN (GLUCOPHAGE) 1,000 mg, oral, 2 times daily (morning and late afternoon)    omega 3-dha-epa-fish oil (Fish OiL) 1,200 (144-216) mg capsule 1 capsule, Daily    pantoprazole (PROTONIX) 40 mg, Daily before breakfast    rosuvastatin (CRESTOR) 20 mg, oral, Daily        Allergies:  Allergies   Allergen Reactions    Cyclobenzaprine Other, Rash and Hives    Amoxicillin-Pot Clavulanate Other and Nausea/vomiting    Bee Venom Protein (Honey Bee) Swelling    Pneumococcal 23-Katelyn Ps Vaccine Other and Hives        Physical Exam:  Last Recorded Vitals  Temperature 36.7 °C (98.1 °F), height 1.626 m (5' 4\"), weight 88 kg (194 lb). , Body mass index is 33.3 kg/m².  []General appearance: Well-developed, well-nourished in no acute distress, conversant with normal voice quality    Head/face: No erythema or edema or facial tenderness, and normal facial nerve function bilaterally    External ear: Clear external auditory canals with normal pinnae  Tube status: N/A  Middle ear: Tympanic membranes intact and mobile, middle ears normal.  Tympanic membrane perforation: N/A  Mastoid bowl: N/A  Hearing: Normal conversational awareness at normal speech thresholds    Nose visualized using: Anterior rhinoscopy  Nasal dorsum: Nontraumatic midline appearance  Septum: Midline, nonobstructing  Inferior turbinates: Normal, pink  Secretions: Dry    Oral cavity and oropharynx: Normal  Teeth: Good condition  Floor of mouth: without lesions  Palate: Normal hard palate, soft palate and uvula  Oropharynx: Clear, no lesions present  Buccal mucosa: Normal without masses or lesions  Lips: Normal    Nasopharynx: Normal on endoscopy.  Larynx and hypopharynx: Flexible laryngoscopy was performed secondary to an inadequate mirror examination.  With verbal informed consent the flexible laryngoscope was passed " through the nasal cavity.  The patient had a normal epiglottis, AE folds, arytenoids, false vocal cords, true vocal cords, and normal vocal cord mobility bilaterally.  No significant mucosal masses or lesions noted    Neck:  Salivary glands: Normal bilateral parotid and submandibular glands by inspection and palpation.  Non-thyroid masses: No palpable masses or significant lymphadenopathy  Trachea: Midline  Thyroid: No thyromegaly or palpable nodules  Temporomandibular joint: Nontender  Cervical range of motion: Normal    Neurologic exam: Alert and oriented x3, appropriate affect.  Cranial nerves II-XII normal bilaterally  Extraocular movement: Extraocular movement intact, normal gaze alignment          Latosha was seen today for new patient visit.  Diagnoses and all orders for this visit:  Other hyperparathyroidism (Multi) (Primary)  Thyroid nodule incidentally noted on imaging study  -     Referral to ENT  -     Consult to Interventional Radiology; Future  Hypercalcemia  -     Referral to ENT  FHH (familial hypocalciuric hypercalcemia)  Hoarseness of voice       PLAN  I have reviewed all of her lab work as well as imaging studies.  Her 24-hour urine calcium is most consistent with FHH as a diagnosis for her hypercalcemia.  However her PTH levels are much higher than expected to be seen with a FHH.  The CT scan shows a definitive mass adjacent to the thyroid on the left and the parathyroid ultrasound confirms a 2.8 cm mass that they felt was in the lateral aspect of the thyroid and is a T RADS 4 nodule.  I have recommended that we proceed with biopsy of this mass and determine further management after that.  She will call me for results after the biopsy.  Reassurance regarding her hoarseness.  Normal laryngeal exam.  Dr. Yeboah has adjusted her medications for a low TSH level        Eric Rojas MD

## 2024-12-12 ENCOUNTER — HOSPITAL ENCOUNTER (OUTPATIENT)
Dept: RADIOLOGY | Facility: CLINIC | Age: 74
End: 2024-12-12
Payer: MEDICARE

## 2024-12-13 ENCOUNTER — TELEPHONE (OUTPATIENT)
Dept: ENDOCRINOLOGY | Facility: HOSPITAL | Age: 74
End: 2024-12-13
Payer: MEDICARE

## 2024-12-13 DIAGNOSIS — E06.3 HYPOTHYROIDISM DUE TO HASHIMOTO THYROIDITIS: Primary | ICD-10-CM

## 2024-12-13 RX ORDER — LEVOTHYROXINE SODIUM 100 UG/1
100 TABLET ORAL DAILY
Qty: 90 TABLET | Refills: 3 | Status: SHIPPED | OUTPATIENT
Start: 2024-12-13 | End: 2025-12-13

## 2024-12-17 ENCOUNTER — TELEPHONE (OUTPATIENT)
Dept: CARDIOLOGY | Facility: HOSPITAL | Age: 74
End: 2024-12-17
Payer: MEDICARE

## 2024-12-17 DIAGNOSIS — E11.9 TYPE 2 DIABETES MELLITUS WITHOUT COMPLICATION, WITHOUT LONG-TERM CURRENT USE OF INSULIN (MULTI): ICD-10-CM

## 2024-12-18 DIAGNOSIS — E11.9 TYPE 2 DIABETES MELLITUS WITHOUT COMPLICATION, WITHOUT LONG-TERM CURRENT USE OF INSULIN (MULTI): ICD-10-CM

## 2024-12-19 ENCOUNTER — HOSPITAL ENCOUNTER (OUTPATIENT)
Dept: RADIOLOGY | Facility: CLINIC | Age: 74
Discharge: HOME | End: 2024-12-19
Payer: MEDICARE

## 2024-12-19 DIAGNOSIS — N95.0 POST-MENOPAUSAL BLEEDING: ICD-10-CM

## 2024-12-19 PROCEDURE — 76830 TRANSVAGINAL US NON-OB: CPT

## 2024-12-20 ENCOUNTER — HOSPITAL ENCOUNTER (OUTPATIENT)
Dept: RADIOLOGY | Facility: CLINIC | Age: 74
Discharge: HOME | End: 2024-12-20
Payer: MEDICARE

## 2024-12-20 DIAGNOSIS — C67.9 MALIGNANT NEOPLASM OF URINARY BLADDER, UNSPECIFIED SITE (MULTI): ICD-10-CM

## 2024-12-20 PROCEDURE — 2550000001 HC RX 255 CONTRASTS: Performed by: STUDENT IN AN ORGANIZED HEALTH CARE EDUCATION/TRAINING PROGRAM

## 2024-12-20 PROCEDURE — 76377 3D RENDER W/INTRP POSTPROCES: CPT

## 2024-12-25 ENCOUNTER — PREP FOR PROCEDURE (OUTPATIENT)
Dept: UROLOGY | Facility: HOSPITAL | Age: 74
End: 2024-12-25
Payer: MEDICARE

## 2024-12-25 DIAGNOSIS — R39.9 LOWER URINARY TRACT SYMPTOMS (LUTS): ICD-10-CM

## 2024-12-25 DIAGNOSIS — C67.4 MALIGNANT NEOPLASM OF POSTERIOR WALL OF URINARY BLADDER (MULTI): Primary | ICD-10-CM

## 2025-01-02 ENCOUNTER — HOSPITAL ENCOUNTER (OUTPATIENT)
Dept: RADIOLOGY | Facility: HOSPITAL | Age: 75
Discharge: HOME | End: 2025-01-02
Payer: MEDICARE

## 2025-01-02 VITALS
OXYGEN SATURATION: 94 % | TEMPERATURE: 98.1 F | DIASTOLIC BLOOD PRESSURE: 87 MMHG | RESPIRATION RATE: 16 BRPM | SYSTOLIC BLOOD PRESSURE: 147 MMHG | HEART RATE: 80 BPM

## 2025-01-02 DIAGNOSIS — E04.1 THYROID NODULE INCIDENTALLY NOTED ON IMAGING STUDY: ICD-10-CM

## 2025-01-02 PROCEDURE — 2500000004 HC RX 250 GENERAL PHARMACY W/ HCPCS (ALT 636 FOR OP/ED): Performed by: STUDENT IN AN ORGANIZED HEALTH CARE EDUCATION/TRAINING PROGRAM

## 2025-01-02 PROCEDURE — 76942 ECHO GUIDE FOR BIOPSY: CPT

## 2025-01-02 RX ORDER — DIPHENHYDRAMINE HCL 25 MG
50 TABLET ORAL NIGHTLY PRN
COMMUNITY

## 2025-01-02 RX ORDER — LIDOCAINE HYDROCHLORIDE 10 MG/ML
INJECTION, SOLUTION EPIDURAL; INFILTRATION; INTRACAUDAL; PERINEURAL
Status: COMPLETED | OUTPATIENT
Start: 2025-01-02 | End: 2025-01-02

## 2025-01-02 RX ADMIN — LIDOCAINE HYDROCHLORIDE 6 ML: 10 INJECTION, SOLUTION EPIDURAL; INFILTRATION; INTRACAUDAL; PERINEURAL at 10:49

## 2025-01-02 ASSESSMENT — COLUMBIA-SUICIDE SEVERITY RATING SCALE - C-SSRS
6. HAVE YOU EVER DONE ANYTHING, STARTED TO DO ANYTHING, OR PREPARED TO DO ANYTHING TO END YOUR LIFE?: NO
2. HAVE YOU ACTUALLY HAD ANY THOUGHTS OF KILLING YOURSELF?: NO
1. IN THE PAST MONTH, HAVE YOU WISHED YOU WERE DEAD OR WISHED YOU COULD GO TO SLEEP AND NOT WAKE UP?: NO

## 2025-01-02 ASSESSMENT — PAIN SCALES - GENERAL
PAINLEVEL_OUTOF10: 0 - NO PAIN

## 2025-01-02 ASSESSMENT — PAIN - FUNCTIONAL ASSESSMENT: PAIN_FUNCTIONAL_ASSESSMENT: 0-10

## 2025-01-02 NOTE — POST-PROCEDURE NOTE
Interventional Radiology Brief Postprocedure Note    Attending: Daniel Gonzales    Diagnosis: Left neck mass    Description of procedure: US guided 22G fine needle aspiration of left neck mass adjacent to the thyroid x3    Anesthesia:  Local    Complications: None    Estimated Blood Loss: minimal    Medications (Filter: Administrations occurring from 1026 to 1106 on 01/02/25) As of 01/02/25 1106      lidocaine PF (Xylocaine) 10 mg/mL (1 %) injection (mL) Total volume:  6 mL      Date/Time Rate/Dose/Volume Action       01/02/25  1049 6 mL Given                   ID Type Source Tests Collected by Time   1 : left lateral to thyroid Non-Gynecologic Cytology MISCELLANEOUS (OTHER) FINE NEEDLE ASPIRATION CYTOLOGY CONSULTATION (NON-GYNECOLOGIC) Demetri Gonzales MD 1/2/2025 1055         See detailed result report with images in PACS.    The patient tolerated the procedure well without incident or complication and is in stable condition.

## 2025-01-02 NOTE — DISCHARGE INSTRUCTIONS
Patient and Family Education  What is a Biopsy or Aspiration?  A biopsy or aspiration is used to help doctors diagnose disease. Small pieces of tissue or cells  are taken from the area using a special kind of needle. The tissue is sent to the lab to be looked  at under a microscope. The procedure can take up to 1 hour.  Before the Test:  ? If you take blood thinning medications, you Must ask your doctor when you should stop  taking them. You may need to stop taking the medicine up to seven (7) days prior to the  test.  ? Do Not Drink or Eat Anything after midnight the day before the test. You may take  medications with a small amount of clear liquid.  ? If you take daily oral diabetic medications, contact your Radiologist or your Radiology  Nurse to determine if you should take your medicine before the test or adjust the dosage.  ? Make plans for a ride home if you are an outpatient.  ? If you have an allergy to iodine or iodinated contrast, your doctor may prescribe special  pills to take before the test.  During the Test:  ? You will lie on a padded X-Ray table.  ? You may be given medicine that will make you drowsy.  ? You will also be given a local anesthetic to numb the biopsy site.  ? You will feel pressure during the biopsy.  After the Test:  ? You will remain on bed rest for 1 to 4 hours.  ? Your blood pressure, pulse and biopsy site will be checked often.  ? If a large sample of tissue needs to be taken, you may be admitted to the hospital for  observation.  Following these instructions for a safer recovery:     Page 2 of 2  Activity:  ? Limit your activity for 24 hours after the test.  ? Do not drive for 24 hours.  ? Do not do any heavy lifting, such as groceries, for 24 hours.  ? Avoid intense exercise and contact sports for 24 hours.  Diet:  ? You may resume your normal diet.  Medicines:  ? If you take medications to thin your blood, ask your doctor when you should start taking  them after the test.  ?  You may take your other medicines as ordered by your doctor.  Call your Doctor if you have:  ? Redness, swelling or pus-like drainage at the biopsy site.  ? Temperature of 100.4 F degrees or higher.  ? Increased pain or tenderness at the biopsy site.  ? Any questions.  Call your Doctor Right Away if you have any of the Signs:  ? Increase in pain in the biopsy site.  ? Dizziness or fainting.  ? Shortness of breath or trouble breathing.  ? Bleeding from the biopsy site.  If you are not able to contact your doctor, call 911 and/or go to the nearest hospital.     How to Reach your Doctor:  Call Dr. Rojas at [phone] with problems or questions.

## 2025-01-02 NOTE — PRE-PROCEDURE NOTE
Interventional Radiology Preprocedure Note    Indication for procedure: The encounter diagnosis was Thyroid nodule incidentally noted on imaging study.    Relevant review of systems: NA    Relevant Labs:   Lab Results   Component Value Date    CREATININE 0.97 11/29/2024    EGFR 62 11/29/2024    INR 1.1 10/04/2024    PROTIME 12.6 10/04/2024       Planned Sedation/Anesthesia: Minimal    Airway assessment: normal    Directed physical examination:    GENERAL: awake, no acute distress  HEENT: AT/NC  NECK: supple  CV: RRR  PULM: non-labored breathing  ABDOMEN: soft, ND  NEURO: AAOx3  MSK: full ROM  SKIN: no rash    Benefits, risks and alternatives of procedure and planned sedation have been discussed with the patient and/or their representative. All questions answered and they agree to proceed.

## 2025-01-08 ENCOUNTER — APPOINTMENT (OUTPATIENT)
Dept: UROLOGY | Facility: CLINIC | Age: 75
End: 2025-01-08
Payer: MEDICARE

## 2025-01-08 LAB
LAB AP ASR DISCLAIMER: NORMAL
LABORATORY COMMENT REPORT: NORMAL
LABORATORY COMMENT REPORT: NORMAL
PATH REPORT.FINAL DX SPEC: NORMAL
PATH REPORT.GROSS SPEC: NORMAL
PATH REPORT.TOTAL CANCER: NORMAL

## 2025-01-09 DIAGNOSIS — E21.0 PRIMARY HYPERPARATHYROIDISM (MULTI): Primary | ICD-10-CM

## 2025-01-09 NOTE — RESULT ENCOUNTER NOTE
Needle biopsy of the left parathyroid mass returned consistent with parathyroid adenoma.  It stained for PTH.  I discussed the case with her endocrinologist to raises the concern that this mass may represent a paraganglioma.  I do not believe this is fully consistent with the biopsy results.  I do believe she is more likely dealing with a parathyroid adenoma.  The CT scan does show the a 3 cm mass and it is a little bit more lateral near the carotid and certainly could represent a paraganglioma based on imaging studies but I do not believe this would be consistent with the FNA.  Her laboratory studies do show a slightly elevated metanephrine in her urine.  To best sort this out I have recommended that we proceed with a nuclear medicine SPECT-CT scan of the parathyroids.  If this returns consistent with parathyroid I believe we have her answer that this is a parathyroid adenoma and if it is negative then the mass may be more likely a paraganglioma.  I called the patient and explained this to her in a slow and steady fashion and she understands the above and the rationale for moving forward with a imaging study.  She will reach out to me after she gets the SPECT-CT scan

## 2025-01-14 ENCOUNTER — PRE-ADMISSION TESTING (OUTPATIENT)
Dept: PREADMISSION TESTING | Facility: HOSPITAL | Age: 75
End: 2025-01-14
Payer: MEDICARE

## 2025-01-14 ENCOUNTER — HOSPITAL ENCOUNTER (OUTPATIENT)
Dept: RADIOLOGY | Facility: HOSPITAL | Age: 75
Discharge: HOME | End: 2025-01-14
Payer: MEDICARE

## 2025-01-14 VITALS
WEIGHT: 189.6 LBS | DIASTOLIC BLOOD PRESSURE: 84 MMHG | HEART RATE: 90 BPM | SYSTOLIC BLOOD PRESSURE: 130 MMHG | RESPIRATION RATE: 16 BRPM | BODY MASS INDEX: 32.37 KG/M2 | OXYGEN SATURATION: 96 % | TEMPERATURE: 97.7 F | HEIGHT: 64 IN

## 2025-01-14 DIAGNOSIS — R39.9 LOWER URINARY TRACT SYMPTOMS (LUTS): ICD-10-CM

## 2025-01-14 DIAGNOSIS — E06.3 HYPOTHYROIDISM DUE TO HASHIMOTO THYROIDITIS: ICD-10-CM

## 2025-01-14 DIAGNOSIS — C67.4 MALIGNANT NEOPLASM OF POSTERIOR WALL OF URINARY BLADDER (MULTI): ICD-10-CM

## 2025-01-14 DIAGNOSIS — Z01.818 PREOPERATIVE EXAMINATION: Primary | ICD-10-CM

## 2025-01-14 LAB
APPEARANCE UR: CLEAR
ATRIAL RATE: 82 BPM
BILIRUB UR STRIP.AUTO-MCNC: NEGATIVE MG/DL
COLOR UR: ABNORMAL
ERYTHROCYTE [DISTWIDTH] IN BLOOD BY AUTOMATED COUNT: 13.5 % (ref 11.5–14.5)
GLUCOSE UR STRIP.AUTO-MCNC: ABNORMAL MG/DL
HCT VFR BLD AUTO: 37.4 % (ref 36–46)
HGB BLD-MCNC: 11.7 G/DL (ref 12–16)
HOLD SPECIMEN: NORMAL
KETONES UR STRIP.AUTO-MCNC: NEGATIVE MG/DL
LEUKOCYTE ESTERASE UR QL STRIP.AUTO: NEGATIVE
MCH RBC QN AUTO: 30.2 PG (ref 26–34)
MCHC RBC AUTO-ENTMCNC: 31.3 G/DL (ref 32–36)
MCV RBC AUTO: 96 FL (ref 80–100)
NITRITE UR QL STRIP.AUTO: NEGATIVE
NRBC BLD-RTO: 0 /100 WBCS (ref 0–0)
P AXIS: 35 DEGREES
P OFFSET: 214 MS
P ONSET: 150 MS
PH UR STRIP.AUTO: 5.5 [PH]
PLATELET # BLD AUTO: 285 X10*3/UL (ref 150–450)
PR INTERVAL: 154 MS
PROT UR STRIP.AUTO-MCNC: NEGATIVE MG/DL
Q ONSET: 227 MS
QRS COUNT: 14 BEATS
QRS DURATION: 62 MS
QT INTERVAL: 360 MS
QTC CALCULATION(BAZETT): 420 MS
QTC FREDERICIA: 399 MS
R AXIS: 3 DEGREES
RBC # BLD AUTO: 3.88 X10*6/UL (ref 4–5.2)
RBC # UR STRIP.AUTO: NEGATIVE /UL
SP GR UR STRIP.AUTO: 1.02
T AXIS: 37 DEGREES
T OFFSET: 407 MS
T4 FREE SERPL-MCNC: 1.5 NG/DL (ref 0.61–1.12)
TSH SERPL-ACNC: 0.2 MIU/L (ref 0.44–3.98)
UROBILINOGEN UR STRIP.AUTO-MCNC: NORMAL MG/DL
VENTRICULAR RATE: 82 BPM
WBC # BLD AUTO: 6.8 X10*3/UL (ref 4.4–11.3)

## 2025-01-14 PROCEDURE — 99204 OFFICE O/P NEW MOD 45 MIN: CPT | Performed by: NURSE PRACTITIONER

## 2025-01-14 PROCEDURE — 84439 ASSAY OF FREE THYROXINE: CPT

## 2025-01-14 PROCEDURE — 81003 URINALYSIS AUTO W/O SCOPE: CPT

## 2025-01-14 PROCEDURE — 36415 COLL VENOUS BLD VENIPUNCTURE: CPT

## 2025-01-14 PROCEDURE — 85027 COMPLETE CBC AUTOMATED: CPT

## 2025-01-14 PROCEDURE — 87081 CULTURE SCREEN ONLY: CPT | Mod: GEALAB

## 2025-01-14 PROCEDURE — 93005 ELECTROCARDIOGRAM TRACING: CPT

## 2025-01-14 PROCEDURE — 84443 ASSAY THYROID STIM HORMONE: CPT

## 2025-01-14 PROCEDURE — 71046 X-RAY EXAM CHEST 2 VIEWS: CPT

## 2025-01-14 RX ORDER — CHLORHEXIDINE GLUCONATE 40 MG/ML
1 SOLUTION TOPICAL DAILY
Start: 2025-01-14 | End: 2025-01-19

## 2025-01-14 RX ORDER — CHLORHEXIDINE GLUCONATE ORAL RINSE 1.2 MG/ML
15 SOLUTION DENTAL DAILY
Qty: 30 ML | Refills: 0 | Status: SHIPPED | OUTPATIENT
Start: 2025-01-14 | End: 2025-01-16

## 2025-01-14 ASSESSMENT — ENCOUNTER SYMPTOMS
RESPIRATORY NEGATIVE: 1
EYES NEGATIVE: 1
NEUROLOGICAL NEGATIVE: 1
NECK NEGATIVE: 1
CARDIOVASCULAR NEGATIVE: 1
CONSTITUTIONAL NEGATIVE: 1
MUSCULOSKELETAL NEGATIVE: 1
GASTROINTESTINAL NEGATIVE: 1

## 2025-01-14 ASSESSMENT — DUKE ACTIVITY SCORE INDEX (DASI)
CAN YOU HAVE SEXUAL RELATIONS: NO
CAN YOU CLIMB A FLIGHT OF STAIRS OR WALK UP A HILL: YES
CAN YOU PARTICIPATE IN MODERATE RECREATIONAL ACTIVITIES LIKE GOLF, BOWLING, DANCING, DOUBLES TENNIS OR THROWING A BASEBALL OR FOOTBALL: NO
CAN YOU PARTICIPATE IN STRENOUS SPORTS LIKE SWIMMING, SINGLES TENNIS, FOOTBALL, BASKETBALL, OR SKIING: NO
DASI METS SCORE: 6.1
TOTAL_SCORE: 26.95
CAN YOU WALK A BLOCK OR TWO ON LEVEL GROUND: YES
CAN YOU DO YARD WORK LIKE RAKING LEAVES, WEEDING OR PUSHING A MOWER: NO
CAN YOU DO LIGHT WORK AROUND THE HOUSE LIKE DUSTING OR WASHING DISHES: YES
CAN YOU RUN A SHORT DISTANCE: NO
CAN YOU DO MODERATE WORK AROUND THE HOUSE LIKE VACUUMING, SWEEPING FLOORS OR CARRYING GROCERIES: YES
CAN YOU WALK INDOORS, SUCH AS AROUND YOUR HOUSE: YES
CAN YOU TAKE CARE OF YOURSELF (EAT, DRESS, BATHE, OR USE TOILET): YES
CAN YOU DO HEAVY WORK AROUND THE HOUSE LIKE SCRUBBING FLOORS OR LIFTING AND MOVING HEAVY FURNITURE: YES

## 2025-01-14 ASSESSMENT — ACTIVITIES OF DAILY LIVING (ADL): ADL_SCORE: 0

## 2025-01-14 ASSESSMENT — PAIN SCALES - GENERAL: PAINLEVEL_OUTOF10: 0 - NO PAIN

## 2025-01-14 NOTE — CPM/PAT H&P
CPM/PAT Evaluation       Name: Latosha Larson (Latosha Larson)  /Age: 1950/74 y.o.     Visit Type:   In-Person       Chief Complaint: Malignant neoplasm of posterior wall of urinary bladder    HPI 73 y/o female scheduled for transurethral resection of bladder tumor on 2025 with  Dr. Phipps secondary to Malignant neoplasm of posterior wall of urinary bladder.  PMHX includes T2DM, HTN, DVT, parathyroid nodule.  PAT is consulted today for perioperative risk stratification and optimization.      Past Medical History:   Diagnosis Date    Allergic rhinitis 2023    Ankle fracture, left 2023    Body mass index (BMI) 33.0-33.9, adult 2021    Body mass index (BMI) of 33.0 to 33.9 in adult    Bursitis of right knee 10/08/2023    Cardiomegaly     Chronic back pain     Class 2 obesity with body mass index (BMI) of 35.0 to 35.9 in adult 2024    35.31 kg/m²    Closed dislocation of ankle 10/08/2023    Complement 4 deficiency (Multi)     denies    Compression of vein 2023    Depression     Diabetes mellitus (Multi)     Type II    DJD (degenerative joint disease)     DVT (deep venous thrombosis) (Multi)       left leg    Dysphagia 2023    History of blood transfusion       after surgery    HLD (hyperlipidemia)     HTN (hypertension)     denies    Hypercalcemia 10/08/2023    Hypothyroidism     IBS (irritable bowel syndrome)     Kidney stone 10/08/2023    Lactic acidosis 10/08/2023    Lower leg edema 10/08/2023    Lumbar spondylosis     Lung nodules     multiple    Macular degeneration     Malignant neoplasm of bladder     May-Thurner syndrome     OA (osteoarthritis)     Other amnesia 2022    Memory changes    Palpitations 2023    PE (pulmonary thromboembolism) (Multi)         Peptic ulcer 07/10/2008    Right upper quadrant pain 2021    Abdominal pain, RUQ    Sepsis (Multi) 10/08/2023    Vitamin D deficiency     Wears hearing aid in both ears        Past  Surgical History:   Procedure Laterality Date    BALLOON ANGIOPLASTY, ARTERY      iliac artery stent LLE    BLADDER TUMOR EXCISION  2022    Transurethral resection of bladder tumor    CATARACT EXTRACTION W/  INTRAOCULAR LENS IMPLANT Bilateral     Dr Alfonso    CERVICAL BIOPSY  W/ LOOP ELECTRODE EXCISION      cone biopsy     SECTION, LOW TRANSVERSE       section x3    COLONOSCOPY      CT ABDOMEN PELVIS ANGIOGRAM W AND/OR WO IV CONTRAST  2020    CT ABDOMEN PELVIS ANGIOGRAM W AND/OR WO IV CONTRAST [facility]    CYSTOSCOPY      with TURBT  multiple    IR INTERVENTION FILTER PLACEMENT      IVC FILTER RETRIEVAL      ORIF ANKLE FRACTURE Left     TONSILLECTOMY      VARICOSE VEIN SURGERY Left     varicose vein ligation       Patient  reports that she is not currently sexually active.    Family History   Problem Relation Name Age of Onset    Lupus Mother      Heart disease Mother      Heart disease Father      Cancer Father      Breast cancer Paternal Grandmother         Allergies   Allergen Reactions    Cyclobenzaprine Other, Rash and Hives    Amoxicillin-Pot Clavulanate Other and Nausea/vomiting    Bee Venom Protein (Honey Bee) Swelling    Pneumococcal 23-Katelyn Ps Vaccine Other and Hives       Prior to Admission medications    Medication Sig Start Date End Date Taking? Authorizing Provider   betamethasone dipropionate (Diprosone) 0.05 % lotion once daily.  Patient not taking: Reported on 2025   Historical Provider, MD   blood sugar diagnostic strip 1 each 3 times a day. 24   Dipti Aleman, APRN-CNP   cholecalciferol (Vitamin D-3) 125 MCG (5000 UT) capsule Take 1 capsule (125 mcg) by mouth once daily.    Historical Provider, MD   cyanocobalamin (Vitamin B-12) 100 mcg tablet Take 1 tablet (100 mcg) by mouth once daily.    Historical Provider, MD   diphenhydrAMINE (Sominex) 25 mg tablet Take 2 tablets (50 mg) by mouth as needed at bedtime for sleep.    Historical Provider, MD    Eliquis 5 mg tablet Take 1 tablet (5 mg) by mouth 2 times a day. 6/13/24   Anupama Frost MD, MS   levothyroxine (Synthroid, Levoxyl) 100 mcg tablet Take 1 tablet (100 mcg) by mouth early in the morning.. Take on an empty stomach at the same time each day, either 30 to 60 minutes prior to breakfast 12/13/24 12/13/25  Willy Lord MD   levothyroxine (Synthroid, Levoxyl) 88 mcg tablet Take 1 tablet (88 mcg) by mouth early in the morning.. Take on an empty stomach at the same time each day, either 30 to 60 minutes prior to breakfast  Patient not taking: Reported on 1/2/2025 11/21/24 11/21/25  FAITH Loaiza   liothyronine (Cytomel) 5 mcg tablet Take 1 tablet (5 mcg) by mouth once daily.  Patient not taking: Reported on 1/2/2025 11/18/24   FAITH Loaiza   metFORMIN (Glucophage) 500 mg tablet Take 2 tablets (1,000 mg) by mouth 2 times daily (morning and late afternoon). 11/18/24   FAITH Loaiza   omega 3-dha-epa-fish oil (Fish OiL) 1,200 (144-216) mg capsule Take 1 capsule (1,200 mg) by mouth once daily.    Historical Provider, MD   pantoprazole (ProtoNix) 40 mg EC tablet Take 1 tablet (40 mg) by mouth once daily in the morning. Take before meals. 10/19/21   Historical Provider, MD   rosuvastatin (Crestor) 20 mg tablet Take 1 tablet (20 mg) by mouth once daily. 11/18/24   FAITH Loaiza        PAT ROS:   Constitutional:   neg    Neuro/Psych:   neg    Eyes:   neg    Ears:   neg    Nose:   Mouth:   Throat:   Neck:   neg    Cardio:   neg    Respiratory:   neg    Endocrine:   GI:   neg    :   neg    Musculoskeletal:   neg    Hematologic:    history of blood transfusion  Skin:      Physical Exam  Vitals reviewed.   Constitutional:       Appearance: Normal appearance.   HENT:      Head: Normocephalic and atraumatic.      Mouth/Throat:      Mouth: Mucous membranes are moist.      Pharynx: Oropharynx is clear.   Eyes:      Extraocular Movements: Extraocular movements intact.      Pupils:  "Pupils are equal, round, and reactive to light.   Cardiovascular:      Rate and Rhythm: Normal rate and regular rhythm.   Pulmonary:      Effort: Pulmonary effort is normal.      Breath sounds: Normal breath sounds.   Abdominal:      General: Abdomen is flat.      Palpations: Abdomen is soft.   Musculoskeletal:         General: Normal range of motion.      Cervical back: Normal range of motion and neck supple.   Skin:     General: Skin is warm and dry.   Neurological:      General: No focal deficit present.      Mental Status: She is alert and oriented to person, place, and time.   Psychiatric:         Mood and Affect: Mood normal.         Behavior: Behavior normal.          Airway    Testing/Diagnostic:     Patient Specialist/PCP:     Visit Vitals  /84   Pulse 90   Temp 36.5 °C (97.7 °F) (Tympanic)   Resp 16   Ht 1.626 m (5' 4\")   Wt 86 kg (189 lb 9.5 oz)   LMP  (LMP Unknown)   SpO2 96%   BMI 32.54 kg/m²   OB Status Postmenopausal   Smoking Status Former   BSA 1.97 m²       DASI Risk Score      Flowsheet Row Pre-Admission Testing [facility] 1/14/20[facility] in [facility]   Can you take care of yourself (eat, dress, bathe, or use toilet)?  2.75 filed at 01/14/2025 1103   Can you walk indoors, such as around your house? 1.75 filed at 01/14/2025 1103   Can you walk a block or two on level ground?  2.75 filed at 01/14/2025 1103   Can you climb a flight of stairs or walk up a hill? 5.5 filed at 01/14/2025 1103   Can you run a short distance? 0 filed at 01/14/2025 1103   Can you do light work around the house like dusting or washing dishes? 2.7 filed at 01/14/2025 1103   Can you do moderate work around the house like vacuuming, sweeping floors or carrying groceries? 3.5 filed at 01/14/2025 1103   Can you do heavy work around the house like scrubbing floors or lifting and moving heavy furniture?  8 filed at 01/14/2025 1103   Can you do yard work like raking leaves, weeding or pushing a mower? 0 filed at 01/14/2025 " 1103   Can you have sexual relations? 0 filed at 01/14/2025 1103   Can you participate in moderate recreational activities like golf, bowling, dancing, doubles tennis or throwing a baseball or football? 0 filed at 01/14/2025 1103   Can you participate in strenous sports like swimming, singles tennis, football, basketball, or skiing? 0 filed at 01/14/2025 1103   DASI SCORE 26.95 filed at 01/14/2025 1103   METS Score (Will be calculated only when all the questions are answered) 6.1 filed at 01/14/2025 1103          Caprini DVT Assessment      Flowsheet Row Pre-Admission Testing from 1/14/2025 in [facility] Ultrasound Guided Procedure from 1/2/2025 in [facility] with Demetri Gonzales MD, Lida Sharp RN and Mallika Damico RN   DVT Score (IF A SCORE IS NOT CALCULATING, MUST SELECT A BMI TO COMPLETE) 9 filed at 01/14/2025 1132 5 filed at 01/02/2025 0948   Medical Factors History of DVT/PE filed at 01/14/2025 1132 --   Surgical Factors Minor surgery planned filed at 01/14/2025 1132 --   BMI (BMI MUST BE CHOSEN) 31-40 (Obesity) filed at 01/14/2025 1132 31-40 (Obesity) filed at 01/02/2025 0948          Modified Frailty Index    No data to display       CHADS2 Stroke Risk  Current as of 10 minutes ago        N/A 3 to 100%: High Risk   2 to < 3%: Medium Risk   0 to < 2%: Low Risk     Last Change: N/A          This score determines the patient's risk of having a stroke if the patient has atrial fibrillation.        This score is not applicable to this patient. Components are not calculated.          Revised Cardiac Risk Index      Flowsheet Row Pre-Admission Testing from 2/27/2024 in [facility]   High-Risk Surgery (Intraperitoneal, Intrathoracic,Suprainguinal vascular) 0 filed at 02/27/2024 1353   History of ischemic heart disease (History of MI, History of positive exercuse test, Current chest paint considered due to myocardial ischemia, Use of nitrate therapy, ECG with  pathological Q Waves) 0 filed at 2024 1353   History of congestive heart failure (pulmonary edemia, bilateral rales or S3 gallop, Paroxysmal nocturnal dyspnea, CXR showing pulmonary vascular redistribution) 0 filed at 2024 1353   History of cerebrovascular disease (Prior TIA or stroke) 0 filed at 2024 1353   Pre-operative insulin treatment 0 filed at 2024 1353   Pre-operative creatinine>2 mg/dl 0 filed at 2024 1353   Revised Cardiac Risk Calculator 0 filed at 2024 1353          Apfel Simplified Score      Flowsheet Row Pre-Admission Testing from 2024 in [facility]   Smoking status 1 filed at 2024 1356   History of motion sickness or PONV  0 filed at 2024 1356   Use of postoperative opioids 1 filed at 2024 1356   Apfel Simplified Score Calculator 3 filed at 2024 1356          Risk Analysis Index Results This Encounter         2025  1104             Do you live in a place other than your own home?: 0    When did you begin living in the place you are currently residing?: Greater than one year ago    Any kidney failure, kidney not working well, or seeing a kidney doctor (nephrologist)? If yes, was this for kidney stones or another problem?: 0 No    Any history of chronic (long-term) congestive heart failure (CHF)?: 0 No    Any shortness of breath when resting?: 0 No    In the past five years, have you been diagnosed with or treated for cancer?: No    During the last 3 months has it become difficult for you to remember things or organize your thoughts?: 0 No    Have you lost weight of 10 pounds or more in the past 3 months without trying?: 0 No    Do you have any loss of appetitie?: 0 No    Getting Around (Mobility): 0 Can get around without help    Eatin Can plan and prepare own meals    Toiletin Can use toilet without any help    Personal Hygiene (Bathing, Hand Washing, Changing Clothes): 0 Can shower or bathe without any help     SALCEDO Cancer History: Patient does not indicate history of cancer    Total Risk Analysis Index Score Without Cancer: 22    Total Risk Analysis Index Score: 22          Stop Bang Score      Flowsheet Row Pre-Admission Testing from 1/14/2025 in [facility]   Do you snore loudly? 0 filed at 01/14/2025 1103   Do you often feel tired or fatigued after your sleep? 0 filed at 01/14/2025 1103   Has anyone ever observed you stop breathing in your sleep? 0 filed at 01/14/2025 1103   Do you have or are you being treated for high blood pressure? 0 filed at 01/14/2025 1103   Recent BMI (Calculated) 33.3 filed at 01/14/2025 1103   Is BMI greater than 35 kg/m2? 0=No filed at 01/14/2025 1103   Age older than 50 years old? 1=Yes filed at 01/14/2025 1103   Is your neck circumference greater than 17 inches (Male) or 16 inches (Female)? 0 filed at 01/14/2025 1103   Gender - Male 0=No filed at 01/14/2025 1103   STOP-BANG Total Score 1 filed at 01/14/2025 1103          Prodigy: High Risk  Total Score: 12              Prodigy Age Score           ARISCAT Score for Postoperative Pulmonary Complications    No data to display       Ricci Perioperative Risk for Myocardial Infarction or Cardiac Arrest (MIC)    No data to display         Assessment and Plan:       HPI 73 y/o female scheduled for transurethral resection of bladder tumor on 1/21/2025 with  Dr. Phipps secondary to Malignant neoplasm of posterior wall of urinary bladder.  PMHX includes T2DM, HTN, DVT (Left Leg), May-Thurner syndrome w/stent parathyroid nodule.  PAT is consulted today for perioperative risk stratification and optimization.      Neuro:  No neurologic diagnosis, however, the patient is at increased risk for perioperative delirium secondary to  age, polypharmacy  Patient is at increased risk for perioperative CVA secondary to  perioperative interruption of anticoagulant, HTN, increased age    HEENT:  Follows with Otolaryngology, Dr. Rojas.  Seen  12/9/2024 for hyperparathyroidism, thyroid nodule    Cardiovascular:  Follows with Cardiology, Dr. Frost and was last seen 6/13/2024  Will hold Eliquis 3 days prior to procedure  METS: 6.1  RCRI: 0 points, 3.9%  risk for postoperative MACE       Pulmonary:  No pulmonary diagnosis, however patient is at increased risk of perioperative complications secondary to  age > 60  Stop Bang score is 1 placing patient at low risk for SARAHI  PRODIGY: Moderate risk for opioid induced respiratory depression  Pumonary education discussed, patient also provided deep breathing exerciseswith educational handout    Renal:   No renal diagnosis, however patient is at increase risk for perioperative renal complications secondary to Age equal to or greater than 56, BMI equal to or greater than 30      Endocrine:  T2DM  and Acquired Hypothyroidism   Holding Metformin day of procedure   Continue levothyroxine    Hematologic:  No hematologic diagnosis, however patient is at an increased risk for DVT  Caprini Score 9, patient at High risk for perioperative DVT.  Patient provided with VTE education/handout.    Gastrointestinal:   No GI diagnosis or significant findings on chart review or clinical presentation and evaluation.   Apfel 3    Urology  Seen by Dr. Phipps on 4/1/2024 for malignant neoplasm of urinary bladder  Plan for Transurethral resection of bladder    Infectious disease:   No infectious diagnosis or significant findings on chart review or clinical presentation and evaluation.   Prescription provided for CHG body wash and dental rinse. CHG use instructions reviewed and provided to patient.  Staph screen collected    Musculoskeletal:   No diagnosis or significant findings on chart review or clinical presentation and evaluation.     Anesthesia/Airway:  No anesthesia complications      Medication instructions and NPO guidelines reviewed with the patient.  All questions or concerns discussed and addressed.      Labs and EKG ordered

## 2025-01-14 NOTE — H&P (VIEW-ONLY)
CPM/PAT Evaluation       Name: Latosha Larson (Latosha Larson)  /Age: 1950/74 y.o.     Visit Type:   In-Person       Chief Complaint: Malignant neoplasm of posterior wall of urinary bladder    HPI 73 y/o female scheduled for transurethral resection of bladder tumor on 2025 with  Dr. Phipps secondary to Malignant neoplasm of posterior wall of urinary bladder.  PMHX includes T2DM, HTN, DVT, parathyroid nodule.  PAT is consulted today for perioperative risk stratification and optimization.      Past Medical History:   Diagnosis Date    Allergic rhinitis 2023    Ankle fracture, left 2023    Body mass index (BMI) 33.0-33.9, adult 2021    Body mass index (BMI) of 33.0 to 33.9 in adult    Bursitis of right knee 10/08/2023    Cardiomegaly     Chronic back pain     Class 2 obesity with body mass index (BMI) of 35.0 to 35.9 in adult 2024    35.31 kg/m²    Closed dislocation of ankle 10/08/2023    Complement 4 deficiency (Multi)     denies    Compression of vein 2023    Depression     Diabetes mellitus (Multi)     Type II    DJD (degenerative joint disease)     DVT (deep venous thrombosis) (Multi)       left leg    Dysphagia 2023    History of blood transfusion       after surgery    HLD (hyperlipidemia)     HTN (hypertension)     denies    Hypercalcemia 10/08/2023    Hypothyroidism     IBS (irritable bowel syndrome)     Kidney stone 10/08/2023    Lactic acidosis 10/08/2023    Lower leg edema 10/08/2023    Lumbar spondylosis     Lung nodules     multiple    Macular degeneration     Malignant neoplasm of bladder     May-Thurner syndrome     OA (osteoarthritis)     Other amnesia 2022    Memory changes    Palpitations 2023    PE (pulmonary thromboembolism) (Multi)         Peptic ulcer 07/10/2008    Right upper quadrant pain 2021    Abdominal pain, RUQ    Sepsis (Multi) 10/08/2023    Vitamin D deficiency     Wears hearing aid in both ears        Past  Surgical History:   Procedure Laterality Date    BALLOON ANGIOPLASTY, ARTERY      iliac artery stent LLE    BLADDER TUMOR EXCISION  2022    Transurethral resection of bladder tumor    CATARACT EXTRACTION W/  INTRAOCULAR LENS IMPLANT Bilateral     Dr Alfonso    CERVICAL BIOPSY  W/ LOOP ELECTRODE EXCISION      cone biopsy     SECTION, LOW TRANSVERSE       section x3    COLONOSCOPY      CT ABDOMEN PELVIS ANGIOGRAM W AND/OR WO IV CONTRAST  2020    CT ABDOMEN PELVIS ANGIOGRAM W AND/OR WO IV CONTRAST [facility]    CYSTOSCOPY      with TURBT  multiple    IR INTERVENTION FILTER PLACEMENT      IVC FILTER RETRIEVAL      ORIF ANKLE FRACTURE Left     TONSILLECTOMY      VARICOSE VEIN SURGERY Left     varicose vein ligation       Patient  reports that she is not currently sexually active.    Family History   Problem Relation Name Age of Onset    Lupus Mother      Heart disease Mother      Heart disease Father      Cancer Father      Breast cancer Paternal Grandmother         Allergies   Allergen Reactions    Cyclobenzaprine Other, Rash and Hives    Amoxicillin-Pot Clavulanate Other and Nausea/vomiting    Bee Venom Protein (Honey Bee) Swelling    Pneumococcal 23-Katelyn Ps Vaccine Other and Hives       Prior to Admission medications    Medication Sig Start Date End Date Taking? Authorizing Provider   betamethasone dipropionate (Diprosone) 0.05 % lotion once daily.  Patient not taking: Reported on 2025   Historical Provider, MD   blood sugar diagnostic strip 1 each 3 times a day. 24   Dipti Aleman, APRN-CNP   cholecalciferol (Vitamin D-3) 125 MCG (5000 UT) capsule Take 1 capsule (125 mcg) by mouth once daily.    Historical Provider, MD   cyanocobalamin (Vitamin B-12) 100 mcg tablet Take 1 tablet (100 mcg) by mouth once daily.    Historical Provider, MD   diphenhydrAMINE (Sominex) 25 mg tablet Take 2 tablets (50 mg) by mouth as needed at bedtime for sleep.    Historical Provider, MD    Eliquis 5 mg tablet Take 1 tablet (5 mg) by mouth 2 times a day. 6/13/24   Anupama Frost MD, MS   levothyroxine (Synthroid, Levoxyl) 100 mcg tablet Take 1 tablet (100 mcg) by mouth early in the morning.. Take on an empty stomach at the same time each day, either 30 to 60 minutes prior to breakfast 12/13/24 12/13/25  Willy Lord MD   levothyroxine (Synthroid, Levoxyl) 88 mcg tablet Take 1 tablet (88 mcg) by mouth early in the morning.. Take on an empty stomach at the same time each day, either 30 to 60 minutes prior to breakfast  Patient not taking: Reported on 1/2/2025 11/21/24 11/21/25  FAITH Loaiza   liothyronine (Cytomel) 5 mcg tablet Take 1 tablet (5 mcg) by mouth once daily.  Patient not taking: Reported on 1/2/2025 11/18/24   FAITH Loaiza   metFORMIN (Glucophage) 500 mg tablet Take 2 tablets (1,000 mg) by mouth 2 times daily (morning and late afternoon). 11/18/24   FAITH Loaiza   omega 3-dha-epa-fish oil (Fish OiL) 1,200 (144-216) mg capsule Take 1 capsule (1,200 mg) by mouth once daily.    Historical Provider, MD   pantoprazole (ProtoNix) 40 mg EC tablet Take 1 tablet (40 mg) by mouth once daily in the morning. Take before meals. 10/19/21   Historical Provider, MD   rosuvastatin (Crestor) 20 mg tablet Take 1 tablet (20 mg) by mouth once daily. 11/18/24   FAITH Loaiza        PAT ROS:   Constitutional:   neg    Neuro/Psych:   neg    Eyes:   neg    Ears:   neg    Nose:   Mouth:   Throat:   Neck:   neg    Cardio:   neg    Respiratory:   neg    Endocrine:   GI:   neg    :   neg    Musculoskeletal:   neg    Hematologic:    history of blood transfusion  Skin:      Physical Exam  Vitals reviewed.   Constitutional:       Appearance: Normal appearance.   HENT:      Head: Normocephalic and atraumatic.      Mouth/Throat:      Mouth: Mucous membranes are moist.      Pharynx: Oropharynx is clear.   Eyes:      Extraocular Movements: Extraocular movements intact.      Pupils:  "Pupils are equal, round, and reactive to light.   Cardiovascular:      Rate and Rhythm: Normal rate and regular rhythm.   Pulmonary:      Effort: Pulmonary effort is normal.      Breath sounds: Normal breath sounds.   Abdominal:      General: Abdomen is flat.      Palpations: Abdomen is soft.   Musculoskeletal:         General: Normal range of motion.      Cervical back: Normal range of motion and neck supple.   Skin:     General: Skin is warm and dry.   Neurological:      General: No focal deficit present.      Mental Status: She is alert and oriented to person, place, and time.   Psychiatric:         Mood and Affect: Mood normal.         Behavior: Behavior normal.          Airway    Testing/Diagnostic:     Patient Specialist/PCP:     Visit Vitals  /84   Pulse 90   Temp 36.5 °C (97.7 °F) (Tympanic)   Resp 16   Ht 1.626 m (5' 4\")   Wt 86 kg (189 lb 9.5 oz)   LMP  (LMP Unknown)   SpO2 96%   BMI 32.54 kg/m²   OB Status Postmenopausal   Smoking Status Former   BSA 1.97 m²       DASI Risk Score      Flowsheet Row Pre-Admission Testing from 1/14/2025 in [facility]   Can you take care of yourself (eat, dress, bathe, or use toilet)?  2.75 filed at 01/14/2025 1103   Can you walk indoors, such as around your house? 1.75 filed at 01/14/2025 1103   Can you walk a block or two on level ground?  2.75 filed at 01/14/2025 1103   Can you climb a flight of stairs or walk up a hill? 5.5 filed at 01/14/2025 1103   Can you run a short distance? 0 filed at 01/14/2025 1103   Can you do light work around the house like dusting or washing dishes? 2.7 filed at 01/14/2025 1103   Can you do moderate work around the house like vacuuming, sweeping floors or carrying groceries? 3.5 filed at 01/14/2025 1103   Can you do heavy work around the house like scrubbing floors or lifting and moving heavy furniture?  8 filed at 01/14/2025 1103   Can you do yard work like raking leaves, weeding or pushing a mower? 0 filed at 01/14/2025 " 1103   Can you have sexual relations? 0 filed at 01/14/2025 1103   Can you participate in moderate recreational activities like golf, bowling, dancing, doubles tennis or throwing a baseball or football? 0 filed at 01/14/2025 1103   Can you participate in strenous sports like swimming, singles tennis, football, basketball, or skiing? 0 filed at 01/14/2025 1103   DASI SCORE 26.95 filed at 01/14/2025 1103   METS Score (Will be calculated only when all the questions are answered) 6.1 filed at 01/14/2025 1103          Caprini DVT Assessment      Flowsheet Row Pre-Admission Testing from 1/14/2025 in [facility] Ultrasound Guided Procedure from 1/2/2025 in [facility] with Demetri Gonzales MD, Lida Sharp RN and Mallika Damico RN   DVT Score (IF A SCORE IS NOT CALCULATING, MUST SELECT A BMI TO COMPLETE) 9 filed at 01/14/2025 1132 5 filed at 01/02/2025 0948   Medical Factors History of DVT/PE filed at 01/14/2025 1132 --   Surgical Factors Minor surgery planned filed at 01/14/2025 1132 --   BMI (BMI MUST BE CHOSEN) 31-40 (Obesity) filed at 01/14/2025 1132 31-40 (Obesity) filed at 01/02/2025 0948          Modified Frailty Index    No data to display       CHADS2 Stroke Risk  Current as of 10 minutes ago        N/A 3 to 100%: High Risk   2 to < 3%: Medium Risk   0 to < 2%: Low Risk     Last Change: N/A          This score determines the patient's risk of having a stroke if the patient has atrial fibrillation.        This score is not applicable to this patient. Components are not calculated.          Revised Cardiac Risk Index      Flowsheet Row Pre-Admission Testing from 2/27/2024 in [facility]   High-Risk Surgery (Intraperitoneal, Intrathoracic,Suprainguinal vascular) 0 filed at 02/27/2024 1353   History of ischemic heart disease (History of MI, History of positive exercuse test, Current chest paint considered due to myocardial ischemia, Use of nitrate therapy, ECG with  pathological Q Waves) 0 filed at 2024 1353   History of congestive heart failure (pulmonary edemia, bilateral rales or S3 gallop, Paroxysmal nocturnal dyspnea, CXR showing pulmonary vascular redistribution) 0 filed at 2024 1353   History of cerebrovascular disease (Prior TIA or stroke) 0 filed at 2024 1353   Pre-operative insulin treatment 0 filed at 2024 1353   Pre-operative creatinine>2 mg/dl 0 filed at 2024 1353   Revised Cardiac Risk Calculator 0 filed at 2024 1353          Apfel Simplified Score      Flowsheet Row Pre-Admission Testing from 2024 in [facility]   Smoking status 1 filed at 2024 1356   History of motion sickness or PONV  0 filed at 2024 1356   Use of postoperative opioids 1 filed at 2024 1356   Apfel Simplified Score Calculator 3 filed at 2024 1356          Risk Analysis Index Results This Encounter         2025  1104             Do you live in a place other than your own home?: 0    When did you begin living in the place you are currently residing?: Greater than one year ago    Any kidney failure, kidney not working well, or seeing a kidney doctor (nephrologist)? If yes, was this for kidney stones or another problem?: 0 No    Any history of chronic (long-term) congestive heart failure (CHF)?: 0 No    Any shortness of breath when resting?: 0 No    In the past five years, have you been diagnosed with or treated for cancer?: No    During the last 3 months has it become difficult for you to remember things or organize your thoughts?: 0 No    Have you lost weight of 10 pounds or more in the past 3 months without trying?: 0 No    Do you have any loss of appetitie?: 0 No    Getting Around (Mobility): 0 Can get around without help    Eatin Can plan and prepare own meals    Toiletin Can use toilet without any help    Personal Hygiene (Bathing, Hand Washing, Changing Clothes): 0 Can shower or bathe without any help     SALCEDO Cancer History: Patient does not indicate history of cancer    Total Risk Analysis Index Score Without Cancer: 22    Total Risk Analysis Index Score: 22          Stop Bang Score      Flowsheet Row Pre-Admission Testing from 1/14/2025 in [facility]   Do you snore loudly? 0 filed at 01/14/2025 1103   Do you often feel tired or fatigued after your sleep? 0 filed at 01/14/2025 1103   Has anyone ever observed you stop breathing in your sleep? 0 filed at 01/14/2025 1103   Do you have or are you being treated for high blood pressure? 0 filed at 01/14/2025 1103   Recent BMI (Calculated) 33.3 filed at 01/14/2025 1103   Is BMI greater than 35 kg/m2? 0=No filed at 01/14/2025 1103   Age older than 50 years old? 1=Yes filed at 01/14/2025 1103   Is your neck circumference greater than 17 inches (Male) or 16 inches (Female)? 0 filed at 01/14/2025 1103   Gender - Male 0=No filed at 01/14/2025 1103   STOP-BANG Total Score 1 filed at 01/14/2025 1103          Prodigy: High Risk  Total Score: 12              Prodigy Age Score           ARISCAT Score for Postoperative Pulmonary Complications    No data to display       Ricci Perioperative Risk for Myocardial Infarction or Cardiac Arrest (MIC)    No data to display         Assessment and Plan:       HPI 75 y/o female scheduled for transurethral resection of bladder tumor on 1/21/2025 with  Dr. Phipps secondary to Malignant neoplasm of posterior wall of urinary bladder.  PMHX includes T2DM, HTN, DVT (Left Leg), May-Thurner syndrome w/stent parathyroid nodule.  PAT is consulted today for perioperative risk stratification and optimization.      Neuro:  No neurologic diagnosis, however, the patient is at increased risk for perioperative delirium secondary to  age, polypharmacy  Patient is at increased risk for perioperative CVA secondary to  perioperative interruption of anticoagulant, HTN, increased age    HEENT:  Follows with Otolaryngology, Dr. Rojas.  Seen  12/9/2024 for hyperparathyroidism, thyroid nodule    Cardiovascular:  Follows with Cardiology, Dr. Frost and was last seen 6/13/2024  Will hold Eliquis 3 days prior to procedure  METS: 6.1  RCRI: 0 points, 3.9%  risk for postoperative MACE       Pulmonary:  No pulmonary diagnosis, however patient is at increased risk of perioperative complications secondary to  age > 60  Stop Bang score is 1 placing patient at low risk for SARAHI  PRODIGY: Moderate risk for opioid induced respiratory depression  Pumonary education discussed, patient also provided deep breathing exerciseswith educational handout    Renal:   No renal diagnosis, however patient is at increase risk for perioperative renal complications secondary to Age equal to or greater than 56, BMI equal to or greater than 30      Endocrine:  T2DM  and Acquired Hypothyroidism   Holding Metformin day of procedure   Continue levothyroxine    Hematologic:  No hematologic diagnosis, however patient is at an increased risk for DVT  Caprini Score 9, patient at High risk for perioperative DVT.  Patient provided with VTE education/handout.    Gastrointestinal:   No GI diagnosis or significant findings on chart review or clinical presentation and evaluation.   Apfel 3    Urology  Seen by Dr. Phipps on 4/1/2024 for malignant neoplasm of urinary bladder  Plan for Transurethral resection of bladder    Infectious disease:   No infectious diagnosis or significant findings on chart review or clinical presentation and evaluation.   Prescription provided for CHG body wash and dental rinse. CHG use instructions reviewed and provided to patient.  Staph screen collected    Musculoskeletal:   No diagnosis or significant findings on chart review or clinical presentation and evaluation.     Anesthesia/Airway:  No anesthesia complications      Medication instructions and NPO guidelines reviewed with the patient.  All questions or concerns discussed and addressed.      Labs and EKG ordered

## 2025-01-14 NOTE — PREPROCEDURE INSTRUCTIONS
Thank you for visiting [facility]readmission Testing (PAT) today for your pre-procedure evaluation, you were seen by     Mercy Haile CNP  Pre Admission Testing  [facility]  [phone]    This summary includes instructions and information to aid you during your perioperative period.  Please read carefully. If you have any questions about your visit today, please call the number listed above.  If you become ill or have any changes to your health before your surgery, please contact your primary care provider and alert your surgeon.        Preparing for your Surgery       Exercises  Preoperative Deep Breathing Exercises  Why it is important to do deep breathing exercises before my surgery?  Deep breathing exercises strengthen your breathing muscles.  This helps you to recover after your surgery and decreases the chance of breathing complications.  How are the deep breathing exercises done?  Sit straight with your back supported.  Breathe in deeply and slowly through your nose. Your lower rib cage should expand and your abdomen may move forward.  Hold that breath for 3 to 5 seconds.  Breathe out through pursed lips, slowly and completely.  Rest and repeat 10 times every hour while awake.  Rest longer if you become dizzy or lightheaded.       Preoperative Brain Exercises    What are brain exercises?  A brain exercise is any activity that engages your thinking (cognitive) skills.    What types of activities are considered brain exercises?  Jigsaw puzzles, crossword puzzles, word jumble, memory games, word search, and many more.  Many can be found free online or on your phone via a mobile jabier.    Why should I do brain exercises before my surgery?  More recent research has shown brain exercise before surgery can lower the risk of postoperative delirium (confusion) which can be especially important for older adults.  Patients who did brain exercises for 5 to 10 hours the days before surgery, cut their  risk of postoperative delirium in half up to 1 week after surgery.    Sit-to-Stand Exercise    What is the sit-to-stand exercise?  The sit-to-stand exercise strengthens the muscles of your lower body and muscles in the center of your body (core muscles for stability) helping to maintain and improve your strength and mobility.  How do I do the sit-to-stand exercise?  The goal is to do this exercise without using your arms or hands.  If this is too difficult, use your arms and hands or a chair with armrests to help slowly push yourself to the standing position and lower yourself back to the sitting position. As the movement becomes easier use your arms and hands less.    Steps to the sit-to-stand exercise  Sit up tall in a sturdy chair, knees bent, feet flat on the floor shoulder-width apart.  Shift your hips/pelvis forward in the chair to correctly position yourself for the next movement.  Lean forward at your hips.  Stand up straight putting equal weight on both feet.  Check to be sure you are properly aligned with the chair, in a slow controlled movement sit back down.  Repeat this exercise 10-15 times.  If needed you can do it fewer times until your strength improves.  Rest for 1 minute.  Do another 10-15 sit-to-stand exercises.  Try to do this in the morning and evening.        Instructions    Preoperative Fasting Guidelines    Why must I stop eating and drinking near surgery time?  With sedation, food or liquid in your stomach can enter your lungs causing serious complications  Food can increase nausea and vomiting  When do I need to stop eating and drinking before my surgery?      Do not eat any food after midnight the night before your surgery/procedure. You may have up to 13.5 ounces of clear liquid until TWO hours before your instructed arrival time to the hospital.  This includes water, black tea/coffee, (no milk or cream) apple juice, and electrolyte drinks (Gatorade). You may chew gum until TWO hours  before your surgery/procedure            Simple things you can do to help prevent blood clots     Blood clots are blockages that can form in the body's veins. When a blood clot forms in your deep veins, it may be called a deep vein thrombosis, or DVT for short. Blood clots can happen in any part of the body where blood flows, but they are most common in the arms and legs. If a piece of a blood clot breaks free and travels to the lungs, it is called a pulmonary embolus (PE). A PE can be a very serious problem.         Being in the hospital or having surgery can raise your chances of getting a blood clot because you may not be well enough to move around as much as you normally do.         Ways you can help prevent blood clots in the hospital       Wearing SCDs  SCDs stands for Sequential Compression Devices.   SCDs are special sleeves that wrap around your legs. They attach to a pump that fills them with air to gently squeeze your legs every few minutes.  This helps return the blood in your legs to your heart.   SCDs should only be taken off when walking or bathing. SCDs may not be comfortable, but they can help save your life.              Pump SCD leg sleeves  Wearing compression stockings - if your doctor orders them. These special snug-fitting stockings gently squeeze your legs to help blood flow.       Walking. Walking helps move the blood in your legs.   If your doctor says it is ok, try walking the halls at least   5 times a day. Ask us to help you get up, so you don't fall.      Taking any blood-thinning medicines your doctor orders.              Ways you can help prevent blood clots at home         Wearing compression stockings - if your doctor orders them.   Walking - to help move the blood in your legs.    Taking any blood-thinning medicines your doctor orders.      Signs of a blood clot or PE    Tell your doctor or nurse right away if you have any of the problems listed below.         If you are at home,  "seek medical care right away. Call 911 for chest pain or problems breathing.            Signs of a blood clot (DVT) - such as pain, swelling, redness, or warmth in your arm or legs.  Signs of a pulmonary embolism (PE) - such as chest pain or feeling short of breath      Tobacco and Alcohol;  Do not drink alcohol or smoke within 24 hours of surgery.  It is best to quit smoking for as long as possible before any surgery or procedure.    Other Instructions  Why did I have my nose, under my arms, and groin swabbed? The purpose of the swab is to identify Staphylococcus aureus inside your nose or on your skin.  The swab was sent to the laboratory for culture.  A positive swab/culture for Staphylococcus aureus is called colonization or carriage.     What is Staphylococcus aureus? Staphylococcus aureus, also known as \"staph\", is a germ found on the skin or in the nose of healthy people.  Sometimes Staphylococcus aureus can get into the body and cause an infection.  This can be minor (such as pimples, boils, or other skin problems).  It might also be serious (such as a blood infection, pneumonia, or a surgical site infection).     What is Staphylococcus aureus colonization or carriage? Colonization or carriage means that a person has the germ but is not sick from it.  These bacteria can be spread on the hands or when breathing or sneezing.   How is Staphylococcus aureus spread? It is most often spread by close contact with a person or item that carries it.   What happens if my culture is positive for Staphylococcus aureus? Your doctor/medical team will use this information to guide any antibiotic treatment which may be necessary.  Regardless of the culture results, we will clean the inside of your nose with a betadine swab just before you have your surgery.   Will I get an infection if I have Staphylococcus aureus in my nose or on my skin? Anyone can get an infection with Staphylococcus aureus.  However, the best way to " reduce your risk of infection is to follow the instructions provided to you for the use of your CHG soap and dental rinse.      Body Wash:     What is a home preoperative antibacterial shower? This shower is a way of cleaning the skin with a germ-killing solution before surgery.  The solution contains chlorhexidine, commonly known as CHG.  CHG is a skin cleanser with germ-killing ability.  Let your doctor know if you are allergic to chlorhexidine.   Why do I need to take a preoperative antibacterial shower? Skin is not sterile.  It is best to try to make your skin as free of germs as possible before surgery.  Proper cleansing with a germ-killing soap before surgery can lower the number of germs on your skin.  This helps to reduce the risk of infection at the surgical site.    Following the instructions listed below will help you prepare your skin for surgery.   How do I use the solution?  If you plan to wash your hair, use your regular shampoo; then rinse your hair and body thoroughly to remove any shampoo residue  Wash your face with your regular soap or water only  Thoroughly rinse your body with water from the neck down  Apply Hibiclens directly on your skin or on a wet washcloth and wash gently; move away from the shower stream when applying Hibiclens to avoid rinsing it off too soon  Rinse thoroughly with warm water and keep out of eyes, ears and mouth; if Hibiclens comes in contact with these areas, rinse out promptly  Dry your skin with a towel  Do not use your regular soap after applying and rinsing with Hibiclens  Do not apply lotions or deodorants to the cleaned body area      Oral/Dental Rinse:     What is oral/dental rinse?  It is a mouthwash. It is a way of cleaning the mouth with a germ-killing solution before your surgery.  The solution contains chlorhexidine, commonly known as CHG. It is used inside the mouth to kill a bacteria known as Staphylococcus aureus.  Let your doctor know if you are allergic  to Chlorhexidine.   Why do I need to use CHG oral/dental rinse? The CHG oral/dental rinse helps to kill a bacteria in your mouth known as Staphylococcus aureus.  This reduces the risk of infection at the surgical site.    Using your CHG oral/dental rinse STEPS: Use your CHG oral/dental rinse after you brush your teeth the night before (at bedtime) and the morning of your surgery.  Follow all directions on your prescription label.    Use the cap on the container to measure 15 ml.  Swish (gargle if you can) the mouthwash in your mouth for at least 30 seconds, (do not swallow) and spit out.  After you use your CHG rinse, do not rinse your mouth with water, drink or eat.    Please refer to the prescription label for the appropriate time to resume oral intake   What side effects might I have using the CHG oral/dental rinse? CHG rinse will stick to plaque on the teeth.  Brush and floss just before use.  Teeth brushing will help avoid staining of plaque during use.          The Week before Surgery        Seven days before Surgery  Check your PAT medication instructions  Do the exercises provided to you by [facility]  Arrange for a responsible, adult licensed  to take you home after surgery and stay with you for 24 hours.  You will not be permitted to drive yourself home if you have received any anesthetic/sedation  Six days before surgery  Check your PAT medication instructions  Do the exercises provided to you by PAT  Start using Chlorhexidene (CHG) body wash if prescribed  Five days before surgery  Check your PAT medication instructions  Do the exercises provided to you by PAT  Continue to use CHG body wash if prescribed  Three days before surgery  Check your PAT medication instructions  Do the exercises provided to you by PAT  Continue to use CHG body wash if prescribed  Two days before surgery  Check your PAT medication instructions  Do the exercises provided to you by [facility]  Continue to use CHG body wash if  prescribed    The Day before Surgery       Check your PAT medication and all other PAT instructions including when to stop eating and drinking  You will be called with your arrival time for surgery in the late afternoon.  If you do not receive a call please reach out to [facility]. [phone]  Do not smoke or drink 24 hours before surgery  Prepare items to bring with you to the hospital  Shower with your chlorhexidine wash if prescribed  Brush your teeth and use your chlorhexidine dental rinse if prescribed    The Day of Surgery       Check your PAT medication instructions  Ensure you follow the instructions for when to stop eating and drinking  Shower, if prescribed use CHG.  Do not apply any lotions, creams, moisturizers, perfume or deodorant  Brush your teeth and use your CHG dental rinse if prescribed  Wear loose comfortable clothing  Avoid make-up  Remove  jewelry and piercings, consider professional piercing removal with a plastic spacer if needed  Bring photo ID and Insurance card  Bring an accurate medication list that includes medication dose, frequency and allergies  Bring a copy of your advanced directives (will, health care power of )  Bring any devices and controllers as well as medical devices you have been provided with for surgery (CPAP, slings, braces, etc.)  Dentures, eyeglasses, and contacts will be removed before surgery, please bring cases for contacts or glasses

## 2025-01-16 LAB — STAPHYLOCOCCUS SPEC CULT: NORMAL

## 2025-01-20 ENCOUNTER — DOCUMENTATION (OUTPATIENT)
Dept: ENDOCRINOLOGY | Facility: HOSPITAL | Age: 75
End: 2025-01-20
Payer: MEDICARE

## 2025-01-20 ENCOUNTER — ANESTHESIA EVENT (OUTPATIENT)
Dept: OPERATING ROOM | Facility: HOSPITAL | Age: 75
End: 2025-01-20
Payer: MEDICARE

## 2025-01-20 DIAGNOSIS — E06.3 HYPOTHYROIDISM DUE TO HASHIMOTO THYROIDITIS: Primary | ICD-10-CM

## 2025-01-20 LAB
ATRIAL RATE: 82 BPM
P AXIS: 35 DEGREES
P OFFSET: 214 MS
P ONSET: 150 MS
PR INTERVAL: 154 MS
Q ONSET: 227 MS
QRS COUNT: 14 BEATS
QRS DURATION: 62 MS
QT INTERVAL: 360 MS
QTC CALCULATION(BAZETT): 420 MS
QTC FREDERICIA: 399 MS
R AXIS: 3 DEGREES
T AXIS: 37 DEGREES
T OFFSET: 407 MS
VENTRICULAR RATE: 82 BPM

## 2025-01-20 NOTE — PROGRESS NOTES
Lab results were reviewed. Patient was asked to decrease LT4 from 100 mcg daily to 100 mcg 6.5 tabs per week with repeat blood work in 6 weeks.   Latest Reference Range & Units 01/14/25 11:50   Thyroxine, Free 0.61 - 1.12 ng/dL 1.50 (H)   Thyroid Stimulating Hormone 0.44 - 3.98 mIU/L 0.20 (L)   (H): Data is abnormally high  (L): Data is abnormally low

## 2025-01-21 ENCOUNTER — PHARMACY VISIT (OUTPATIENT)
Dept: PHARMACY | Facility: CLINIC | Age: 75
End: 2025-01-21
Payer: COMMERCIAL

## 2025-01-21 ENCOUNTER — HOSPITAL ENCOUNTER (OUTPATIENT)
Facility: HOSPITAL | Age: 75
Setting detail: OUTPATIENT SURGERY
Discharge: HOME | End: 2025-01-21
Attending: STUDENT IN AN ORGANIZED HEALTH CARE EDUCATION/TRAINING PROGRAM | Admitting: STUDENT IN AN ORGANIZED HEALTH CARE EDUCATION/TRAINING PROGRAM
Payer: MEDICARE

## 2025-01-21 ENCOUNTER — ANESTHESIA (OUTPATIENT)
Dept: OPERATING ROOM | Facility: HOSPITAL | Age: 75
End: 2025-01-21
Payer: MEDICARE

## 2025-01-21 VITALS
SYSTOLIC BLOOD PRESSURE: 155 MMHG | HEIGHT: 64 IN | TEMPERATURE: 96.8 F | RESPIRATION RATE: 20 BRPM | OXYGEN SATURATION: 98 % | DIASTOLIC BLOOD PRESSURE: 86 MMHG | BODY MASS INDEX: 32.54 KG/M2 | HEART RATE: 70 BPM

## 2025-01-21 DIAGNOSIS — E11.9 TYPE 2 DIABETES MELLITUS WITHOUT COMPLICATION, WITHOUT LONG-TERM CURRENT USE OF INSULIN (MULTI): ICD-10-CM

## 2025-01-21 DIAGNOSIS — C67.4 MALIGNANT NEOPLASM OF POSTERIOR WALL OF URINARY BLADDER (MULTI): Primary | ICD-10-CM

## 2025-01-21 DIAGNOSIS — I87.1 MAY-THURNER SYNDROME: ICD-10-CM

## 2025-01-21 DIAGNOSIS — C67.9 MALIGNANT NEOPLASM OF URINARY BLADDER, UNSPECIFIED SITE (MULTI): ICD-10-CM

## 2025-01-21 LAB — GLUCOSE BLD MANUAL STRIP-MCNC: 110 MG/DL (ref 74–99)

## 2025-01-21 PROCEDURE — 7100000009 HC PHASE TWO TIME - INITIAL BASE CHARGE: Performed by: STUDENT IN AN ORGANIZED HEALTH CARE EDUCATION/TRAINING PROGRAM

## 2025-01-21 PROCEDURE — A52235 PR CYSTOURETHROSCOPY,FULGUR 2-5 CM LESN: Performed by: ANESTHESIOLOGY

## 2025-01-21 PROCEDURE — 2500000004 HC RX 250 GENERAL PHARMACY W/ HCPCS (ALT 636 FOR OP/ED): Performed by: ANESTHESIOLOGY

## 2025-01-21 PROCEDURE — 82947 ASSAY GLUCOSE BLOOD QUANT: CPT

## 2025-01-21 PROCEDURE — 99100 ANES PT EXTEME AGE<1 YR&>70: CPT | Performed by: ANESTHESIOLOGY

## 2025-01-21 PROCEDURE — 88307 TISSUE EXAM BY PATHOLOGIST: CPT | Mod: TC,GEALAB | Performed by: STUDENT IN AN ORGANIZED HEALTH CARE EDUCATION/TRAINING PROGRAM

## 2025-01-21 PROCEDURE — 7100000002 HC RECOVERY ROOM TIME - EACH INCREMENTAL 1 MINUTE: Performed by: STUDENT IN AN ORGANIZED HEALTH CARE EDUCATION/TRAINING PROGRAM

## 2025-01-21 PROCEDURE — 7100000010 HC PHASE TWO TIME - EACH INCREMENTAL 1 MINUTE: Performed by: STUDENT IN AN ORGANIZED HEALTH CARE EDUCATION/TRAINING PROGRAM

## 2025-01-21 PROCEDURE — 2720000007 HC OR 272 NO HCPCS: Performed by: STUDENT IN AN ORGANIZED HEALTH CARE EDUCATION/TRAINING PROGRAM

## 2025-01-21 PROCEDURE — 3700000002 HC GENERAL ANESTHESIA TIME - EACH INCREMENTAL 1 MINUTE: Performed by: STUDENT IN AN ORGANIZED HEALTH CARE EDUCATION/TRAINING PROGRAM

## 2025-01-21 PROCEDURE — 52235 CYSTOSCOPY AND TREATMENT: CPT | Performed by: STUDENT IN AN ORGANIZED HEALTH CARE EDUCATION/TRAINING PROGRAM

## 2025-01-21 PROCEDURE — 2500000004 HC RX 250 GENERAL PHARMACY W/ HCPCS (ALT 636 FOR OP/ED): Mod: JZ | Performed by: STUDENT IN AN ORGANIZED HEALTH CARE EDUCATION/TRAINING PROGRAM

## 2025-01-21 PROCEDURE — A52235 PR CYSTOURETHROSCOPY,FULGUR 2-5 CM LESN: Performed by: NURSE ANESTHETIST, CERTIFIED REGISTERED

## 2025-01-21 PROCEDURE — 7100000001 HC RECOVERY ROOM TIME - INITIAL BASE CHARGE: Performed by: STUDENT IN AN ORGANIZED HEALTH CARE EDUCATION/TRAINING PROGRAM

## 2025-01-21 PROCEDURE — 3600000008 HC OR TIME - EACH INCREMENTAL 1 MINUTE - PROCEDURE LEVEL THREE: Performed by: STUDENT IN AN ORGANIZED HEALTH CARE EDUCATION/TRAINING PROGRAM

## 2025-01-21 PROCEDURE — 2500000004 HC RX 250 GENERAL PHARMACY W/ HCPCS (ALT 636 FOR OP/ED): Performed by: NURSE ANESTHETIST, CERTIFIED REGISTERED

## 2025-01-21 PROCEDURE — 3600000003 HC OR TIME - INITIAL BASE CHARGE - PROCEDURE LEVEL THREE: Performed by: STUDENT IN AN ORGANIZED HEALTH CARE EDUCATION/TRAINING PROGRAM

## 2025-01-21 PROCEDURE — 3700000001 HC GENERAL ANESTHESIA TIME - INITIAL BASE CHARGE: Performed by: STUDENT IN AN ORGANIZED HEALTH CARE EDUCATION/TRAINING PROGRAM

## 2025-01-21 PROCEDURE — RXMED WILLOW AMBULATORY MEDICATION CHARGE

## 2025-01-21 RX ORDER — METFORMIN HYDROCHLORIDE 500 MG/1
1000 TABLET ORAL
Start: 2025-01-22

## 2025-01-21 RX ORDER — ALBUTEROL SULFATE 0.83 MG/ML
2.5 SOLUTION RESPIRATORY (INHALATION) ONCE AS NEEDED
Status: DISCONTINUED | OUTPATIENT
Start: 2025-01-21 | End: 2025-01-21 | Stop reason: HOSPADM

## 2025-01-21 RX ORDER — SODIUM CHLORIDE, SODIUM LACTATE, POTASSIUM CHLORIDE, CALCIUM CHLORIDE 600; 310; 30; 20 MG/100ML; MG/100ML; MG/100ML; MG/100ML
100 INJECTION, SOLUTION INTRAVENOUS CONTINUOUS
Status: DISCONTINUED | OUTPATIENT
Start: 2025-01-21 | End: 2025-01-21 | Stop reason: HOSPADM

## 2025-01-21 RX ORDER — MEPERIDINE HYDROCHLORIDE 25 MG/ML
12.5 INJECTION INTRAMUSCULAR; INTRAVENOUS; SUBCUTANEOUS EVERY 10 MIN PRN
Status: DISCONTINUED | OUTPATIENT
Start: 2025-01-21 | End: 2025-01-21 | Stop reason: HOSPADM

## 2025-01-21 RX ORDER — PROPOFOL 10 MG/ML
INJECTION, EMULSION INTRAVENOUS AS NEEDED
Status: DISCONTINUED | OUTPATIENT
Start: 2025-01-21 | End: 2025-01-21

## 2025-01-21 RX ORDER — ONDANSETRON HYDROCHLORIDE 2 MG/ML
INJECTION, SOLUTION INTRAVENOUS AS NEEDED
Status: DISCONTINUED | OUTPATIENT
Start: 2025-01-21 | End: 2025-01-21

## 2025-01-21 RX ORDER — DOCUSATE SODIUM 100 MG/1
100 CAPSULE, LIQUID FILLED ORAL 2 TIMES DAILY
Qty: 20 CAPSULE | Refills: 0 | Status: SHIPPED | OUTPATIENT
Start: 2025-01-21 | End: 2025-01-31

## 2025-01-21 RX ORDER — DROPERIDOL 2.5 MG/ML
0.62 INJECTION, SOLUTION INTRAMUSCULAR; INTRAVENOUS ONCE AS NEEDED
Status: DISCONTINUED | OUTPATIENT
Start: 2025-01-21 | End: 2025-01-21 | Stop reason: HOSPADM

## 2025-01-21 RX ORDER — CEFAZOLIN SODIUM 2 G/100ML
2 INJECTION, SOLUTION INTRAVENOUS ONCE
Status: COMPLETED | OUTPATIENT
Start: 2025-01-21 | End: 2025-01-21

## 2025-01-21 RX ORDER — OXYCODONE HYDROCHLORIDE 5 MG/1
5 TABLET ORAL EVERY 4 HOURS PRN
Status: DISCONTINUED | OUTPATIENT
Start: 2025-01-21 | End: 2025-01-21 | Stop reason: HOSPADM

## 2025-01-21 RX ORDER — MIDAZOLAM HYDROCHLORIDE 1 MG/ML
INJECTION INTRAMUSCULAR; INTRAVENOUS AS NEEDED
Status: DISCONTINUED | OUTPATIENT
Start: 2025-01-21 | End: 2025-01-21

## 2025-01-21 RX ORDER — ONDANSETRON HYDROCHLORIDE 2 MG/ML
4 INJECTION, SOLUTION INTRAVENOUS ONCE AS NEEDED
Status: DISCONTINUED | OUTPATIENT
Start: 2025-01-21 | End: 2025-01-21 | Stop reason: HOSPADM

## 2025-01-21 RX ORDER — TRAMADOL HYDROCHLORIDE 50 MG/1
50 TABLET ORAL EVERY 8 HOURS PRN
Qty: 10 TABLET | Refills: 0 | Status: SHIPPED | OUTPATIENT
Start: 2025-01-21

## 2025-01-21 RX ORDER — LIDOCAINE HYDROCHLORIDE 10 MG/ML
INJECTION, SOLUTION EPIDURAL; INFILTRATION; INTRACAUDAL; PERINEURAL AS NEEDED
Status: DISCONTINUED | OUTPATIENT
Start: 2025-01-21 | End: 2025-01-21

## 2025-01-21 RX ORDER — APIXABAN 5 MG/1
5 TABLET, FILM COATED ORAL 2 TIMES DAILY
Start: 2025-01-27

## 2025-01-21 RX ORDER — LIDOCAINE HYDROCHLORIDE 40 MG/ML
INJECTION, SOLUTION RETROBULBAR AS NEEDED
Status: DISCONTINUED | OUTPATIENT
Start: 2025-01-21 | End: 2025-01-21

## 2025-01-21 RX ORDER — SODIUM CHLORIDE, SODIUM LACTATE, POTASSIUM CHLORIDE, CALCIUM CHLORIDE 600; 310; 30; 20 MG/100ML; MG/100ML; MG/100ML; MG/100ML
20 INJECTION, SOLUTION INTRAVENOUS CONTINUOUS
Status: DISCONTINUED | OUTPATIENT
Start: 2025-01-21 | End: 2025-01-21 | Stop reason: HOSPADM

## 2025-01-21 RX ORDER — FENTANYL CITRATE 50 UG/ML
INJECTION, SOLUTION INTRAMUSCULAR; INTRAVENOUS AS NEEDED
Status: DISCONTINUED | OUTPATIENT
Start: 2025-01-21 | End: 2025-01-21

## 2025-01-21 RX ORDER — PHENAZOPYRIDINE HYDROCHLORIDE 100 MG/1
100 TABLET, FILM COATED ORAL 3 TIMES DAILY PRN
Qty: 10 TABLET | Refills: 0 | Status: SHIPPED | OUTPATIENT
Start: 2025-01-21

## 2025-01-21 RX ORDER — ROCURONIUM BROMIDE 10 MG/ML
INJECTION, SOLUTION INTRAVENOUS AS NEEDED
Status: DISCONTINUED | OUTPATIENT
Start: 2025-01-21 | End: 2025-01-21

## 2025-01-21 RX ORDER — DIPHENHYDRAMINE HYDROCHLORIDE 50 MG/ML
12.5 INJECTION INTRAMUSCULAR; INTRAVENOUS ONCE AS NEEDED
Status: DISCONTINUED | OUTPATIENT
Start: 2025-01-21 | End: 2025-01-21 | Stop reason: HOSPADM

## 2025-01-21 RX ADMIN — CEFAZOLIN SODIUM 2 G: 2 INJECTION, SOLUTION INTRAVENOUS at 12:43

## 2025-01-21 RX ADMIN — LIDOCAINE HYDROCHLORIDE 50 ML: 10 INJECTION, SOLUTION EPIDURAL; INFILTRATION; INTRACAUDAL; PERINEURAL at 12:39

## 2025-01-21 RX ADMIN — ONDANSETRON 4 MG: 2 INJECTION, SOLUTION INTRAMUSCULAR; INTRAVENOUS at 12:33

## 2025-01-21 RX ADMIN — FENTANYL CITRATE 50 MCG: 50 INJECTION, SOLUTION INTRAMUSCULAR; INTRAVENOUS at 12:39

## 2025-01-21 RX ADMIN — DEXAMETHASONE SODIUM PHOSPHATE 4 MG: 4 INJECTION INTRA-ARTICULAR; INTRALESIONAL; INTRAMUSCULAR; INTRAVENOUS; SOFT TISSUE at 12:52

## 2025-01-21 RX ADMIN — SODIUM CHLORIDE, POTASSIUM CHLORIDE, SODIUM LACTATE AND CALCIUM CHLORIDE 20 ML/HR: 600; 310; 30; 20 INJECTION, SOLUTION INTRAVENOUS at 12:11

## 2025-01-21 RX ADMIN — FENTANYL CITRATE 50 MCG: 50 INJECTION, SOLUTION INTRAMUSCULAR; INTRAVENOUS at 12:53

## 2025-01-21 RX ADMIN — PROPOFOL 140 MG: 10 INJECTION, EMULSION INTRAVENOUS at 12:39

## 2025-01-21 RX ADMIN — SUGAMMADEX 200 MG: 100 INJECTION, SOLUTION INTRAVENOUS at 13:08

## 2025-01-21 RX ADMIN — SUGAMMADEX 200 MG: 100 INJECTION, SOLUTION INTRAVENOUS at 13:02

## 2025-01-21 RX ADMIN — LIDOCAINE HYDROCHLORIDE 3 ML: 40 INJECTION, SOLUTION RETROBULBAR; TOPICAL at 12:40

## 2025-01-21 RX ADMIN — ROCURONIUM BROMIDE 50 MG: 10 INJECTION, SOLUTION INTRAVENOUS at 12:39

## 2025-01-21 RX ADMIN — MIDAZOLAM HYDROCHLORIDE 1 MG: 1 INJECTION, SOLUTION INTRAMUSCULAR; INTRAVENOUS at 12:33

## 2025-01-21 SDOH — HEALTH STABILITY: MENTAL HEALTH: CURRENT SMOKER: 0

## 2025-01-21 ASSESSMENT — PAIN SCALES - GENERAL
PAINLEVEL_OUTOF10: 0 - NO PAIN

## 2025-01-21 ASSESSMENT — PAIN - FUNCTIONAL ASSESSMENT
PAIN_FUNCTIONAL_ASSESSMENT: 0-10

## 2025-01-21 NOTE — ANESTHESIA PROCEDURE NOTES
Airway  Date/Time: 1/21/2025 12:41 PM  Urgency: elective    Airway not difficult    Staffing  Performed: CRNA   Authorized by: Urbano Zhu MD    Performed by: ERICA Wong-SANA  Patient location during procedure: OR    Indications and Patient Condition  Indications for airway management: anesthesia  Spontaneous Ventilation: absent  Sedation level: deep  Preoxygenated: yes  Patient position: sniffing  MILS maintained throughout  Mask difficulty assessment: 1 - vent by mask  Planned trial extubation    Final Airway Details  Final airway type: endotracheal airway      Successful airway: ETT  Cuffed: yes   Successful intubation technique: video laryngoscopy (Stevens)  Facilitating devices/methods: intubating stylet  Endotracheal tube insertion site: oral  Blade size: #3  ETT size (mm): 7.5  Cormack-Lehane Classification: grade I - full view of glottis  Placement verified by: chest auscultation and capnometry   Cuff volume (mL): 9  Measured from: teeth  ETT to teeth (cm): 21  Number of attempts at approach: 1  Number of other approaches attempted: 0

## 2025-01-21 NOTE — OP NOTE
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]     Procedures  TRANSURETHRAL RESECTION OF BLADDER TUMOR  [id number] - MD CYSTOURETHROSCOPY W/DEST &/RMVL MED BLADDER VAL      Surgeons      * Stefany Phipps - Primary    Resident/Fellow/Other Assistant:  Surgeons and Role:  * No surgeons found with a matching role *    Staff:   Circulator: Talisha Cottrell Person: Kylee Wheeler Scrub: Levon  RNFA: Ronak    Anesthesia Staff: Anesthesiologist: Urbano Zhu MD  CRNA: ERICA Wong-SANA    Procedure Summary  Anesthesia: General  ASA: III  Estimated Blood Loss: 0 mL  Intra-op Medications:   Administrations occurring from 1200 to 1315 on 25:   Medication Name Total Dose   dexAMETHasone (Decadron) injection 4 mg/mL 4 mg   fentaNYL (Sublimaze) injection 50 mcg/mL 100 mcg   lactated Ringer's infusion 21.33 mL   lidocaine PF (Xylocaine-MPF) local injection 1 % 50 mL   lidocaine PF (Xylocaine-MPF) local injection 4 % 3 mL   midazolam PF (Versed) injection 1 mg/mL 1 mg   ondansetron (Zofran) 2 mg/mL injection 4 mg   propofol (Diprivan) injection 10 mg/mL 140 mg   rocuronium (ZeMuron) 50 mg/5 mL injection 50 mg   sugammadex (Bridion) 200 mg/2 mL injection 200 mg   ceFAZolin (Ancef) 2 g in dextrose (iso)  mL 2 g              Anesthesia Record               Intraprocedure I/O Totals       None           Specimen:   ID Type Source Tests Collected by Time   1 : BLADDER TISSUE Tissue BLADDER BIOPSY SURGICAL PATHOLOGY EXAM Stefany Phipps MD 2025 [id number]                 Drains and/or Catheters:   Urethral Catheter 16 Fr. (Active)       Tourniquet Times:         Implants:      Findings: 2.5cm flat tumor on anterior wall    Indications: Latosha Larson is an 75 y.o. 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 History of LG urothelial carcinoma of the bladder.    The patient was seen in the preoperative area. The risks, benefits, complications, treatment options, non-operative alternatives, expected recovery and outcomes were discussed with the patient. The possibilities of reaction to medication, pulmonary aspiration, injury to surrounding structures, bleeding, recurrent infection, the need for additional procedures, failure to diagnose a condition, and creating a complication requiring transfusion or operation were discussed with the patient. The patient concurred with the proposed plan, giving informed consent.  The site of surgery was properly noted/marked if necessary per policy. The patient has been actively warmed in preoperative area. Preoperative antibiotics have been ordered and given within 1 hours of incision. Venous thrombosis prophylaxis have been ordered including bilateral sequential compression devices    Procedure Details: Patient was consented and antibiotics were started on-call to the OR.  In the operating room, under general anesthesia, patient placed in dorsolithotomy position, genitalia was prepped and draped in usual manner.  #26 resectoscope was inserted down the urethra into the bladder, and cystoscopy revealed the 2.5cm flat tumor on the anterior wall.  Using the loop on the working element, the area of tumor was resected down to the muscle and fat layer.  This was done the whole surface of the tumor.  Extensive hemostasis was performed for the underlying base of the tumor as well as the surrounding mucosa.  All resected chips were removed and sent for pathological examination.   16 French Busch catheter was inserted.  Patient was awakened and transferred to PACU in stable  condition.    Complications:  None; patient tolerated the procedure well.    Disposition: PACU - hemodynamically stable.  Condition: stable         Additional Details:     Attending Attestation: I performed the procedure.    Stefany Phipps  Phone Number: [phone]

## 2025-01-21 NOTE — ANESTHESIA PREPROCEDURE EVALUATION
Patient: Latosha Larson    Procedure Information       Date/Time: 25 1200    Procedure: TRANSURETHRAL RESECTION OF BLADDER TUMOR    [facility]    Surgeons: Stefany Phipps MD          There were no vitals filed for this visit.    Past Surgical History:   Procedure Laterality Date    BALLOON ANGIOPLASTY, ARTERY      iliac artery stent LLE    BLADDER TUMOR EXCISION  2022    Transurethral resection of bladder tumor    CATARACT EXTRACTION W/  INTRAOCULAR LENS IMPLANT Bilateral     Dr Kaden    CERVICAL BIOPSY  W/ LOOP ELECTRODE EXCISION      cone biopsy     SECTION, LOW TRANSVERSE       section x3    COLONOSCOPY      CT ABDOMEN PELVIS ANGIOGRAM W AND/OR WO IV CONTRAST  2020    CT ABDOMEN PELVIS ANGIOGRAM W AND/OR WO IV CONTRAST [facility]    CYSTOSCOPY      with TURBT  multiple    IR INTERVENTION FILTER PLACEMENT      IVC FILTER RETRIEVAL      ORIF ANKLE FRACTURE Left     TONSILLECTOMY      VARICOSE VEIN SURGERY Left     varicose vein ligation     Past Medical History:   Diagnosis Date    Allergic rhinitis 2023    Ankle fracture, left 2023    Body mass index (BMI) 33.0-33.9, adult 2021    Body mass index (BMI) of 33.0 to 33.9 in adult    Bursitis of right knee 10/08/2023    Cardiomegaly     Chronic back pain     Class 2 obesity with body mass index (BMI) of 35.0 to 35.9 in adult 2024    35.31 kg/m²    Closed dislocation of ankle 10/08/2023    Complement 4 deficiency (Multi)     denies    Compression of vein 2023    Depression     Diabetes mellitus (Multi)     Type II    DJD (degenerative joint disease)     DVT (deep venous thrombosis) (Multi)       left leg    Dysphagia 2023    History of blood transfusion       after surgery    HLD (hyperlipidemia)     HTN (hypertension)     denies    Hypercalcemia 10/08/2023    Hypothyroidism     IBS (irritable bowel syndrome)     Kidney stone 10/08/2023    Lactic acidosis  10/08/2023    Lower leg edema 10/08/2023    Lumbar spondylosis     Lung nodules     multiple    Macular degeneration     Malignant neoplasm of bladder     May-Thurner syndrome     OA (osteoarthritis)     Other amnesia 04/11/2022    Memory changes    Palpitations 07/03/2023    PE (pulmonary thromboembolism) (Multi)     2020    Peptic ulcer 07/10/2008    Right upper quadrant pain 09/03/2021    Abdominal pain, RUQ    Sepsis (Multi) 10/08/2023    Vitamin D deficiency     Wears hearing aid in both ears      No current facility-administered medications for this encounter.  Prior to Admission medications    Medication Sig Start Date End Date Taking? Authorizing Provider   betamethasone dipropionate (Diprosone) 0.05 % lotion once daily.  Patient not taking: Reported on 1/14/2025 1/4/24   Historical Provider, MD   blood sugar diagnostic strip 1 each 3 times a day. 12/18/24   FAITH Loaiza   chlorhexidine (Hibiclens) 4 % external liquid Apply 1 Application topically once daily for 5 days. Use per CPM/PAT provided instructions 1/14/25 1/19/25  FAITH Timmons   chlorhexidine (Peridex) 0.12 % solution Use 15 mL in the mouth or throat once daily for 2 doses. 1/14/25 1/16/25  FAITH Timmons   cholecalciferol (Vitamin D-3) 125 MCG (5000 UT) capsule Take 1 capsule (125 mcg) by mouth once daily.    Historical Provider, MD   cyanocobalamin (Vitamin B-12) 100 mcg tablet Take 1 tablet (100 mcg) by mouth once daily.    Historical Provider, MD   diphenhydrAMINE (Sominex) 25 mg tablet Take 2 tablets (50 mg) by mouth as needed at bedtime for sleep.    Historical Provider, MD   Eliquis 5 mg tablet Take 1 tablet (5 mg) by mouth 2 times a day. 6/13/24   Anupama Frost MD, MS   levothyroxine (Synthroid, Levoxyl) 100 mcg tablet Take 1 tablet (100 mcg) by mouth early in the morning.. Take on an empty stomach at the same time each day, either 30 to 60 minutes prior to breakfast 12/13/24 12/13/25  Willy  MD Pop   levothyroxine (Synthroid, Levoxyl) 88 mcg tablet Take 1 tablet (88 mcg) by mouth early in the morning.. Take on an empty stomach at the same time each day, either 30 to 60 minutes prior to breakfast  Patient not taking: Reported on 24  FAITH Loaiza   liothyronine (Cytomel) 5 mcg tablet Take 1 tablet (5 mcg) by mouth once daily.  Patient not taking: Reported on 24   FAITH Loaiza   metFORMIN (Glucophage) 500 mg tablet Take 2 tablets (1,000 mg) by mouth 2 times daily (morning and late afternoon). 24   FAITH Loaiza   omega 3-dha-epa-fish oil (Fish OiL) 1,200 (144-216) mg capsule Take 1 capsule (1,200 mg) by mouth once daily.    Historical Provider, MD   pantoprazole (ProtoNix) 40 mg EC tablet Take 1 tablet (40 mg) by mouth once daily in the morning. Take before meals. 10/19/21   Historical Provider, MD   rosuvastatin (Crestor) 20 mg tablet Take 1 tablet (20 mg) by mouth once daily. 24   FAITH Loaiza     Allergies   Allergen Reactions    Cyclobenzaprine Other, Rash and Hives    Amoxicillin-Pot Clavulanate Other and Nausea/vomiting    Bee Venom Protein (Honey Bee) Swelling    Pneumococcal 23-Katelyn Ps Vaccine Other and Hives     Social History     Tobacco Use    Smoking status: Former     Current packs/day: 0.00     Types: Cigarettes     Quit date:      Years since quittin.0    Smokeless tobacco: Never   Substance Use Topics    Alcohol use: Yes     Comment: Occasionally         Chemistry    Lab Results   Component Value Date/Time     2024 1047    K 4.4 2024 1047     (H) 2024 1047    CO2 22 2024 1047    BUN 19 2024 1047    CREATININE 0.97 2024 1047    Lab Results   Component Value Date/Time    CALCIUM 10.7 (H) 2024 1047    ALKPHOS 51 10/04/2024 1624    AST 18 10/04/2024 1624    ALT 14 10/04/2024 1624    BILITOT 0.8 10/04/2024 1624          Lab Results    Component Value Date/Time    WBC 6.8 01/14/2025 1150    HGB 11.7 (L) 01/14/2025 1150    HCT 37.4 01/14/2025 1150     01/14/2025 1150     Lab Results   Component Value Date/Time    PROTIME 12.6 10/04/2024 1624    INR 1.1 10/04/2024 1624     Encounter Date: 01/14/25   ECG 12 lead   Result Value    Ventricular Rate 82    Atrial Rate 82    GA Interval 154    QRS Duration 62    QT Interval 360    QTC Calculation(Bazett) 420    P Axis 35    R Axis 3    T Axis 37    QRS Count 14    Q Onset 227    P Onset 150    P Offset 214    T Offset 407    QTC Fredericia 399    Narrative    Normal sinus rhythm  Low voltage QRS  Borderline ECG  When compared with ECG of 20-JUL-2023 12:30,  No significant change was found  Confirmed by Kj Chaney ([id number]) on 1/20/2025 1:17:20 PM     No results found for this or any previous visit from the past 1095 days.      Relevant Problems   Cardiac   (+) Embolism and thrombosis of arteries of the lower extremities (Multi)   (+) Hypertensive disorder   (+) Mixed hyperlipidemia      Pulmonary   (+) Multiple lung nodules      Neuro   (+) Anxiety   (+) Depression, major, single episode, mild (CMS-HCC)      GI   (+) Irritable bowel syndrome with diarrhea      Endocrine   (+) Acquired hypothyroidism   (+) Class 1 obesity with body mass index (BMI) of 34.0 to 34.9 in adult   (+) Type 2 diabetes mellitus      Hematology   (+) Deep venous thrombosis (Multi)   (+) Embolism and thrombosis of arteries of the lower extremities (Multi)      Musculoskeletal   (+) Degenerative joint disease   (+) Dextroscoliosis   (+) Lumbar spondylosis      HEENT   (+) Chronic sinusitis       Clinical information reviewed:   Tobacco  Allergies  Meds  Problems  Med Hx  Surg Hx   Fam Hx  Soc   Hx        NPO Detail:  No data recorded     Physical Exam    Airway  Mallampati: II     Cardiovascular - normal exam     Dental    Pulmonary    Abdominal            Anesthesia Plan    History of general anesthesia?:  yes  History of complications of general anesthesia?: no    ASA 3     general     The patient is not a current smoker.  Patient was not previously instructed to abstain from smoking on day of procedure.  Patient did not smoke on day of procedure.  Education provided regarding risk of obstructive sleep apnea.  intravenous induction   Anesthetic plan and risks discussed with patient.    Plan discussed with CRNA.

## 2025-01-21 NOTE — DISCHARGE INSTRUCTIONS
Busch to be removed tomorrow 1/22/25.  HOLD ELIQUIS FOR 5 DAYS - can resume on 1/27/25                                                                                                                            Post-Operative Instructions:  Bladder Biopsy    Medications  Tylenol and/or Motrin may be given to help with generalized discomfort after surgery.   Pyridium may be taken to help if you have burning with urination - this may turn your urine orange.  You may have been prescribed additional narcotics, such as Tramadol, for pain. Use these sparingly as they can cause confusion, fatigue, nausea, and constipation. Take a stool softener to help avoid constipation.   Keep taking any medications that you have been on for urination.   If you are on blood thinners - usually these can be restarted in 5 days if your urine is clear or pink, unless the doctor tells you otherwise. You may continue taking Aspirin immediately.   Please hold your Eliquis for 5 days, until Sunday 1/26     Activity  You may resume your normal diet. You should stick to fluids and bland foods at first, as you may be nauseous after surgery.   Make sure to drink plenty of water and stay hydrated  You may shower immediately after surgery.  Avoid constipation, heavy lifting, and straining your abdomen - these can cause blood in the urine. There are no restrictions on walking, stairs, etc.     Things to expect after surgery  Blood in urine/catheter if present, passage of small clots, and spotting in your underwear.   The urge to urinate frequently or difficulty with urination.  Burning/stinging/pain with urination is normal and can last for several days after surgery.  If you have a catheter:  You may have lower abdominal spasms or feeling like you need to urinate.  Make sure to change to the larger bag at night time if you have a day time leg bag.  If your catheter stops draining urine and you feel your bladder getting full, call the office  immediately.  You may remove the catheter tomorrow    When to Call the Surgeon:  Fever higher than 102?F  Repeated vomiting with inability to keep down fluids  Severe pain not controlled with medication  Inability to urinate for more than 8 hours or if catheter stops draining (if one is present)  For questions or problems, call our office at [phone]  In case of emergency, call 911.    Follow-Up Appointment:  You will receive a phone call with your follow-up appointment details. Please call [phone] if you do not hear from our office within 2 days or if you need to reschedule.

## 2025-01-21 NOTE — ANESTHESIA POSTPROCEDURE EVALUATION
Patient: Latosha Larson    Procedure Summary       Date: 01/21/25 Room / [facility]    Anesthesia Start: 1228 Anesthesia Stop: 1315    Procedure: TRANSURETHRAL RESECTION OF BLADDER TUMOR Diagnosis:       Malignant neoplasm of posterior wall of urinary bladder (Multi)      (Malignant neoplasm of posterior wall of urinary bladder (Multi) [C67.4])    Surgeons: Stefany Phipps MD Responsible Provider: Urbano Zhu MD    Anesthesia Type: general ASA Status: 3            Anesthesia Type: general    Vitals Value Taken Time   /75 01/21/25 1328   Temp 36 °C (96.8 °F) 01/21/25 1313   Pulse 76 01/21/25 1328   Resp 20 01/21/25 1328   SpO2 100 % 01/21/25 1328       Anesthesia Post Evaluation    Patient location during evaluation: PACU  Patient participation: complete - patient participated  Level of consciousness: awake  Pain management: adequate  Multimodal analgesia pain management approach  Airway patency: patent  Two or more strategies used to mitigate risk of obstructive sleep apnea  Cardiovascular status: acceptable  Respiratory status: acceptable  Hydration status: acceptable  Postoperative Nausea and Vomiting: none        No notable events documented.

## 2025-01-22 ENCOUNTER — APPOINTMENT (OUTPATIENT)
Dept: UROLOGY | Facility: CLINIC | Age: 75
End: 2025-01-22
Payer: MEDICARE

## 2025-01-27 ENCOUNTER — HOSPITAL ENCOUNTER (OUTPATIENT)
Dept: RADIOLOGY | Facility: HOSPITAL | Age: 75
Discharge: HOME | End: 2025-01-27
Payer: MEDICARE

## 2025-01-27 ENCOUNTER — HOSPITAL ENCOUNTER (OUTPATIENT)
Dept: RADIOLOGY | Facility: HOSPITAL | Age: 75
End: 2025-01-27
Payer: MEDICARE

## 2025-01-27 DIAGNOSIS — E21.0 PRIMARY HYPERPARATHYROIDISM (MULTI): ICD-10-CM

## 2025-01-27 PROCEDURE — 3430000001 HC RX 343 DIAGNOSTIC RADIOPHARMACEUTICALS: Performed by: OTOLARYNGOLOGY

## 2025-01-27 PROCEDURE — 78072 PARATHYRD PLANAR W/SPECT&CT: CPT

## 2025-01-27 PROCEDURE — 78071 PARATHYRD PLANAR W/WO SUBTRJ: CPT | Performed by: STUDENT IN AN ORGANIZED HEALTH CARE EDUCATION/TRAINING PROGRAM

## 2025-01-27 PROCEDURE — A9500 TC99M SESTAMIBI: HCPCS | Performed by: OTOLARYNGOLOGY

## 2025-01-27 RX ORDER — TETRAKIS(2-METHOXYISOBUTYLISOCYANIDE)COPPER(I) TETRAFLUOROBORATE 1 MG/ML
21 INJECTION, POWDER, LYOPHILIZED, FOR SOLUTION INTRAVENOUS
Status: COMPLETED | OUTPATIENT
Start: 2025-01-27 | End: 2025-01-27

## 2025-01-27 RX ADMIN — TECHNETIUM TC 99M SESTAMIBI 21 MILLICURIE: 1 INJECTION INTRAVENOUS at 10:49

## 2025-01-28 LAB
LABORATORY COMMENT REPORT: NORMAL
PATH REPORT.FINAL DX SPEC: NORMAL
PATH REPORT.GROSS SPEC: NORMAL
PATH REPORT.RELEVANT HX SPEC: NORMAL
PATH REPORT.TOTAL CANCER: NORMAL
RESIDENT REVIEW: NORMAL

## 2025-02-03 DIAGNOSIS — D44.7 PARAGANGLIOMA (MULTI): Primary | ICD-10-CM

## 2025-02-03 NOTE — PROGRESS NOTES
I reviewed all of her imaging studies with Dr. Fonseca in radiology.  The left neck mass is likely a parathyroid adenoma and the smaller area of illumination on nuclear medicine study is probably due to the cystic component within the mass noted on CT scan.  However we cannot be certain that this does not represent a paraganglioma.  Her endocrinologist felt as though it is a paraganglioma and she does have some blood work suggesting this is an underlying problem.  The recommendation was to check a nuclear medicine Dotatate scan to see if this lights up as a paraganglioma.  That will help in the differential.  I explained this to the patient and she will get the study done and I will see her following

## 2025-02-09 NOTE — PROGRESS NOTES
Subjective   Patient ID: Latosha Larson is a 75 y.o. female.      HPI  75 y.o. female presents for follow up regarding recurrent LG NMIBC. During a cystoscopy on 12/4/2024, the patient had a 2.5 cm papillary tumor on the dome. The patient underwent TURBT on 01/21/2025.    The patient has been recovering well postoperatively. She has no signs or symptoms of infection or surgical complication.     The patient has other health concerns related to a nodule in his neck. She saw an endocrinologist that suspects that the nodule could be related or attached to the parathyroid gland. The nodule has not been removed. She is going to a [facility] PET scan tomorrow.     Surgical Pathology Exam collected on 01/21/2025:       Component  Resulting Agency   FINAL DIAGNOSIS   A. BLADDER TUMOR, TRANSURETHRAL RESECTION:   --NON-INVASIVE PAPILLARY UROTHELIAL CARCINOMA, LOW-GRADE.  SEE NOTE.     Note: Portions of detrusor muscle are present, and are not involved by neoplasia.         Review of Systems  A complete review of systems was performed. All systems are noted to be negative unless indicated in the history of present illness, impression, active problem list, or past histories.     Objective   Physical Exam    Assessment/Plan   Diagnoses and all orders for this visit:  Malignant neoplasm of anterior wall of urinary bladder (Multi)  -     Cystoscopy; Future      75 y.o. female presents for follow up regarding recurrent LG NMIBC. During a cystoscopy on 12/4/2024, the patient had a 2.5 cm papillary tumor on the dome. The patient underwent TURBT on 01/21/2025.    I personally reviewed the surgical pathology report collected on 01/21/2025 that determines a low grade non-invasive papillary urothelial carcinoma. I have reviewed the results with the patient. She will undergo a cystoscopy at the end of 04/2025 to ensure no recurrence has occurred. I reviewed that she will require cystoscopy every 3 months.     We discussed that due to the  recurrence of her LG bladder cancer and previous BCG treatment, options for treatment include intravesical chemotherapy versus another round of BCG treatment. We discussed the patient's current health condition including requiring a work up of possible paraganglioma of the neck. Therefore we will defer any intravesical therapy for next recurrence         Plan:  Cystoscopy end 04/2025 for bladder cancer      Scribe Attestation   By signing my name below, IArsh, Christaibnura attestation that this documentation has been prepared under the direction and in the presence of Stefany Phipps MD.

## 2025-02-10 ENCOUNTER — APPOINTMENT (OUTPATIENT)
Dept: UROLOGY | Facility: CLINIC | Age: 75
End: 2025-02-10
Payer: MEDICARE

## 2025-02-10 DIAGNOSIS — C67.3 MALIGNANT NEOPLASM OF ANTERIOR WALL OF URINARY BLADDER (MULTI): Primary | ICD-10-CM

## 2025-02-10 PROCEDURE — 99214 OFFICE O/P EST MOD 30 MIN: CPT | Performed by: STUDENT IN AN ORGANIZED HEALTH CARE EDUCATION/TRAINING PROGRAM

## 2025-02-10 PROCEDURE — 1123F ACP DISCUSS/DSCN MKR DOCD: CPT | Performed by: STUDENT IN AN ORGANIZED HEALTH CARE EDUCATION/TRAINING PROGRAM

## 2025-02-10 PROCEDURE — 1159F MED LIST DOCD IN RCRD: CPT | Performed by: STUDENT IN AN ORGANIZED HEALTH CARE EDUCATION/TRAINING PROGRAM

## 2025-02-10 PROCEDURE — 1157F ADVNC CARE PLAN IN RCRD: CPT | Performed by: STUDENT IN AN ORGANIZED HEALTH CARE EDUCATION/TRAINING PROGRAM

## 2025-02-10 PROCEDURE — G2211 COMPLEX E/M VISIT ADD ON: HCPCS | Performed by: STUDENT IN AN ORGANIZED HEALTH CARE EDUCATION/TRAINING PROGRAM

## 2025-02-10 ASSESSMENT — COLUMBIA-SUICIDE SEVERITY RATING SCALE - C-SSRS
2. HAVE YOU ACTUALLY HAD ANY THOUGHTS OF KILLING YOURSELF?: NO
6. HAVE YOU EVER DONE ANYTHING, STARTED TO DO ANYTHING, OR PREPARED TO DO ANYTHING TO END YOUR LIFE?: NO
1. IN THE PAST MONTH, HAVE YOU WISHED YOU WERE DEAD OR WISHED YOU COULD GO TO SLEEP AND NOT WAKE UP?: NO

## 2025-02-19 ENCOUNTER — HOSPITAL ENCOUNTER (OUTPATIENT)
Dept: RADIOLOGY | Facility: HOSPITAL | Age: 75
Discharge: HOME | End: 2025-02-19
Payer: MEDICARE

## 2025-02-19 DIAGNOSIS — D44.7 PARAGANGLIOMA (MULTI): ICD-10-CM

## 2025-02-19 PROCEDURE — 78815 PET IMAGE W/CT SKULL-THIGH: CPT

## 2025-02-20 ENCOUNTER — DOCUMENTATION (OUTPATIENT)
Dept: OTOLARYNGOLOGY | Facility: HOSPITAL | Age: 75
End: 2025-02-20
Payer: MEDICARE

## 2025-02-20 ENCOUNTER — PREP FOR PROCEDURE (OUTPATIENT)
Dept: OTOLARYNGOLOGY | Facility: HOSPITAL | Age: 75
End: 2025-02-20
Payer: MEDICARE

## 2025-02-20 DIAGNOSIS — E21.0 PRIMARY HYPERPARATHYROIDISM (MULTI): Primary | ICD-10-CM

## 2025-02-20 NOTE — PROGRESS NOTES
No chief complaint on file.     Date of Evaluation: 2/20/2025   HPI  Her nuclear medicine dotatate scan was negative for paraganglioma.  I reviewed these results with the patient and told her I believe we are dealing with a left parathyroid abnormality/adenoma.  I have recommended we proceed with neck exploration with excision of this mass that is likely a parathyroid adenoma.  We spoke over the phone today.    2/3/25: I reviewed all of her imaging studies with Dr. Fonseca in radiology.  The left neck mass is likely a parathyroid adenoma and the smaller area of illumination on nuclear medicine study is probably due to the cystic component within the mass noted on CT scan.  However we cannot be certain that this does not represent a paraganglioma.  Her endocrinologist felt as though it is a paraganglioma and she does have some blood work suggesting this is an underlying problem.  The recommendation was to check a nuclear medicine Dotatate scan to see if this lights up as a paraganglioma.  That will help in the differential.  I explained this to the patient and she will get the study done and I will see her following         Latosha Larson is a 75 y.o. female with hypercalcemia.  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  Bone density study shows osteopenia.  She complains of excessive fatigue.  She has some hoarseness.  She denies dysphagia odynophagia weight loss and otalgia.  Parathyroid hormone 198.  She has osteopenia on bone density study.  A thyroid ultrasound shows July 11, 2024:  Right lobe 2.8 cm, left lobe 2.9 cm.  Left 2.8 x 1.2 x 3.7 cm nodule T RADS 4 in the lateral thyroid    CT scan of neck August 2024 shows:  Assessment for parathyroid adenoma is suboptimal on  this monophasic examination. There is a 2.4 x 1.1 x 1.4 cm  heterogeneous oval mass seen medial to the left common carotid artery  and lateral to the presumed border of the thyroid  (axial series 4,  image 52, series 6, image 50) that also appears slightly  hyperenhancing relative to the thyroid, potentially representing a  parathyroid adenoma. The thyroid appears nonenlarged with additional  subcentimeter nodules.         Past Medical History:   Diagnosis Date    Allergic rhinitis 07/03/2023    Ankle fracture, left 07/03/2023    Body mass index (BMI) 33.0-33.9, adult 06/21/2021    Body mass index (BMI) of 33.0 to 33.9 in adult    Bursitis of right knee 10/08/2023    Cardiomegaly     Chronic back pain     Class 2 obesity with body mass index (BMI) of 35.0 to 35.9 in adult 02/27/2024    35.31 kg/m²    Closed dislocation of ankle 10/08/2023    Complement 4 deficiency (Multi)     denies    Compression of vein 07/03/2023    Depression     Diabetes mellitus (Multi)     Type II    DJD (degenerative joint disease)     DVT (deep venous thrombosis) (Multi)     2020  left leg    Dysphagia 07/03/2023    History of blood transfusion     2020  after surgery    HLD (hyperlipidemia)     HTN (hypertension)     denies    Hypercalcemia 10/08/2023    Hypothyroidism     IBS (irritable bowel syndrome)     Kidney stone 10/08/2023    Lactic acidosis 10/08/2023    Lower leg edema 10/08/2023    Lumbar spondylosis     Lung nodules     multiple    Macular degeneration     Malignant neoplasm of bladder     May-Thurner syndrome     OA (osteoarthritis)     Other amnesia 04/11/2022    Memory changes    Palpitations 07/03/2023    PE (pulmonary thromboembolism) (Multi)     2020    Peptic ulcer 07/10/2008    Right upper quadrant pain 09/03/2021    Abdominal pain, RUQ    Sepsis (Multi) 10/08/2023    Vitamin D deficiency     Wears hearing aid in both ears       Past Surgical History:   Procedure Laterality Date    BALLOON ANGIOPLASTY, ARTERY      iliac artery stent LLE    BLADDER TUMOR EXCISION  12/18/2022    Transurethral resection of bladder tumor    CATARACT EXTRACTION W/  INTRAOCULAR LENS IMPLANT Bilateral     Dr Ice     CERVICAL BIOPSY  W/ LOOP ELECTRODE EXCISION      cone biopsy     SECTION, LOW TRANSVERSE       section x3    COLONOSCOPY      CT ABDOMEN PELVIS ANGIOGRAM W AND/OR WO IV CONTRAST  2020    CT ABDOMEN PELVIS ANGIOGRAM W AND/OR WO IV CONTRAST [facility]    CYSTOSCOPY      with TURBT  multiple    IR INTERVENTION FILTER PLACEMENT      IVC FILTER RETRIEVAL      ORIF ANKLE FRACTURE Left     TONSILLECTOMY      VARICOSE VEIN SURGERY Left     varicose vein ligation          Medications:   Current Outpatient Medications   Medication Instructions    betamethasone dipropionate (Diprosone) 0.05 % lotion once daily.    blood sugar diagnostic strip 1 each, miscellaneous, 3 times daily    cholecalciferol (VITAMIN D-3) 125 mcg, Daily    cyanocobalamin (VITAMIN B-12) 100 mcg, Daily    diphenhydrAMINE (SOMINEX) 50 mg, Nightly PRN    Eliquis 5 mg, oral, 2 times daily    levothyroxine (SYNTHROID, LEVOXYL) 100 mcg, oral, Daily, Take on an empty stomach at the same time each day, either 30 to 60 minutes prior to breakfast    metFORMIN (GLUCOPHAGE) 1,000 mg, oral, 2 times daily (morning and late afternoon)    omega 3-dha-epa-fish oil (Fish OiL) 1,200 (144-216) mg capsule 1 capsule, Daily    pantoprazole (PROTONIX) 40 mg, Daily before breakfast    phenazopyridine (PYRIDIUM) 100 mg, oral, 3 times daily PRN    rosuvastatin (CRESTOR) 20 mg, oral, Daily    traMADol (ULTRAM) 50 mg, oral, Every 8 hours PRN        Allergies:  Allergies   Allergen Reactions    Cyclobenzaprine Other, Rash and Hives    Amoxicillin-Pot Clavulanate Other and Nausea/vomiting    Bee Venom Protein (Honey Bee) Swelling    Pneumococcal 23-Katelyn Ps Vaccine Other and Hives    Venom-Wasp Swelling    Venom-Wasp Protein Swelling        Physical Exam:  Last Recorded Vitals  There were no vitals taken for this visit. , There is no height or weight on file to calculate BMI.  []General appearance: Well-developed, well-nourished in no acute distress,  conversant with normal voice quality    Head/face: No erythema or edema or facial tenderness, and normal facial nerve function bilaterally    External ear: Clear external auditory canals with normal pinnae  Tube status: N/A  Middle ear: Tympanic membranes intact and mobile, middle ears normal.  Tympanic membrane perforation: N/A  Mastoid bowl: N/A  Hearing: Normal conversational awareness at normal speech thresholds    Nose visualized using: Anterior rhinoscopy  Nasal dorsum: Nontraumatic midline appearance  Septum: Midline, nonobstructing  Inferior turbinates: Normal, pink  Secretions: Dry    Oral cavity and oropharynx: Normal  Teeth: Good condition  Floor of mouth: without lesions  Palate: Normal hard palate, soft palate and uvula  Oropharynx: Clear, no lesions present  Buccal mucosa: Normal without masses or lesions  Lips: Normal    Nasopharynx: Normal on endoscopy.  Larynx and hypopharynx: Flexible laryngoscopy was performed secondary to an inadequate mirror examination.  With verbal informed consent the flexible laryngoscope was passed through the nasal cavity.  The patient had a normal epiglottis, AE folds, arytenoids, false vocal cords, true vocal cords, and normal vocal cord mobility bilaterally.  No significant mucosal masses or lesions noted    Neck:  Salivary glands: Normal bilateral parotid and submandibular glands by inspection and palpation.  Non-thyroid masses: No palpable masses or significant lymphadenopathy  Trachea: Midline  Thyroid: No thyromegaly or palpable nodules  Temporomandibular joint: Nontender  Cervical range of motion: Normal    Neurologic exam: Alert and oriented x3, appropriate affect.  Cranial nerves II-XII normal bilaterally  Extraocular movement: Extraocular movement intact, normal gaze alignment          Hyperparathyroidism       PLAN  The most likely diagnosis of the mass adjacent to the left thyroid is a parathyroid adenoma.  All of her labs and the biopsy suggest this other  than the low urine calcium.  I have recommended neck exploration with excision of parathyroid.  I discussed the procedure of minimally invasive parathyroid surgery.  I discussed the risks including but not limited to bleeding, infection, recurrent laryngeal nerve injury, hoarseness, hypoparathyroidism, failure to identify the abnormal gland, and recurrent disease requiring reoperation, and airway compromise.  I discussed the use of intraoperative PTH monitoring.  The patient indicates informed consent and will proceed with scheduling.  She recorded the conversation and gives consent to proceed.  I offered to bring her into the office to discuss further and she feels comfortable proceeding.  I did discuss the potential for a different diagnosis but all indications are that this is a parathyroid adenoma.  She gives consent for intraoperative decision making to include a thyroidectomy and dissection along the internal jugular vein and carotid artery        Eric Rojas MD

## 2025-03-06 LAB
T4 FREE SERPL-MCNC: 1.8 NG/DL (ref 0.8–1.8)
TSH SERPL-ACNC: 0.17 MIU/L (ref 0.4–4.5)

## 2025-03-27 ENCOUNTER — PRE-ADMISSION TESTING (OUTPATIENT)
Dept: PREADMISSION TESTING | Facility: HOSPITAL | Age: 75
End: 2025-03-27
Payer: MEDICARE

## 2025-03-27 VITALS
OXYGEN SATURATION: 99 % | BODY MASS INDEX: 32.1 KG/M2 | SYSTOLIC BLOOD PRESSURE: 137 MMHG | HEIGHT: 64 IN | HEART RATE: 87 BPM | RESPIRATION RATE: 16 BRPM | DIASTOLIC BLOOD PRESSURE: 86 MMHG | TEMPERATURE: 98.2 F | WEIGHT: 188 LBS

## 2025-03-27 DIAGNOSIS — Z01.818 PREOP TESTING: Primary | ICD-10-CM

## 2025-03-27 DIAGNOSIS — E11.9 TYPE 2 DIABETES MELLITUS WITHOUT COMPLICATION, WITHOUT LONG-TERM CURRENT USE OF INSULIN: ICD-10-CM

## 2025-03-27 LAB
ANION GAP SERPL CALCULATED.3IONS-SCNC: 10 MMOL/L (ref 10–20)
BUN SERPL-MCNC: 20 MG/DL (ref 6–23)
CALCIUM SERPL-MCNC: 10.8 MG/DL (ref 8.6–10.3)
CHLORIDE SERPL-SCNC: 108 MMOL/L (ref 98–107)
CO2 SERPL-SCNC: 26 MMOL/L (ref 21–32)
CREAT SERPL-MCNC: 0.93 MG/DL (ref 0.5–1.05)
EGFRCR SERPLBLD CKD-EPI 2021: 65 ML/MIN/1.73M*2
GLUCOSE SERPL-MCNC: 89 MG/DL (ref 74–99)
POTASSIUM SERPL-SCNC: 4.4 MMOL/L (ref 3.5–5.3)
SODIUM SERPL-SCNC: 140 MMOL/L (ref 136–145)

## 2025-03-27 PROCEDURE — 80048 BASIC METABOLIC PNL TOTAL CA: CPT

## 2025-03-27 PROCEDURE — 99204 OFFICE O/P NEW MOD 45 MIN: CPT | Performed by: NURSE PRACTITIONER

## 2025-03-27 PROCEDURE — 83036 HEMOGLOBIN GLYCOSYLATED A1C: CPT | Mod: TRILAB

## 2025-03-27 PROCEDURE — 36415 COLL VENOUS BLD VENIPUNCTURE: CPT

## 2025-03-27 ASSESSMENT — DUKE ACTIVITY SCORE INDEX (DASI)
CAN YOU DO HEAVY WORK AROUND THE HOUSE LIKE SCRUBBING FLOORS OR LIFTING AND MOVING HEAVY FURNITURE: NO
CAN YOU DO LIGHT WORK AROUND THE HOUSE LIKE DUSTING OR WASHING DISHES: YES
TOTAL_SCORE: 24.2
CAN YOU DO YARD WORK LIKE RAKING LEAVES, WEEDING OR PUSHING A MOWER: NO
CAN YOU TAKE CARE OF YOURSELF (EAT, DRESS, BATHE, OR USE TOILET): YES
CAN YOU HAVE SEXUAL RELATIONS: YES
DASI METS SCORE: 5.7
CAN YOU PARTICIPATE IN MODERATE RECREATIONAL ACTIVITIES LIKE GOLF, BOWLING, DANCING, DOUBLES TENNIS OR THROWING A BASEBALL OR FOOTBALL: NO
CAN YOU WALK A BLOCK OR TWO ON LEVEL GROUND: YES
CAN YOU DO MODERATE WORK AROUND THE HOUSE LIKE VACUUMING, SWEEPING FLOORS OR CARRYING GROCERIES: YES
CAN YOU CLIMB A FLIGHT OF STAIRS OR WALK UP A HILL: YES
CAN YOU WALK INDOORS, SUCH AS AROUND YOUR HOUSE: YES
CAN YOU RUN A SHORT DISTANCE: NO
CAN YOU PARTICIPATE IN STRENOUS SPORTS LIKE SWIMMING, SINGLES TENNIS, FOOTBALL, BASKETBALL, OR SKIING: NO

## 2025-03-27 ASSESSMENT — ENCOUNTER SYMPTOMS
HEMATOLOGIC/LYMPHATIC NEGATIVE: 1
ARTHRALGIAS: 1
PALPITATIONS: 1
RESPIRATORY NEGATIVE: 1
GASTROINTESTINAL NEGATIVE: 1
CONSTITUTIONAL NEGATIVE: 1
PSYCHIATRIC NEGATIVE: 1
EYES NEGATIVE: 1
ENDOCRINE NEGATIVE: 1
NEUROLOGICAL NEGATIVE: 1

## 2025-03-27 ASSESSMENT — PAIN SCALES - GENERAL: PAINLEVEL_OUTOF10: 0 - NO PAIN

## 2025-03-27 ASSESSMENT — PAIN - FUNCTIONAL ASSESSMENT: PAIN_FUNCTIONAL_ASSESSMENT: 0-10

## 2025-03-27 NOTE — PREPROCEDURE INSTRUCTIONS
Medication List            Accurate as of March 27, 2025  1:35 PM. Always use your most recent med list.                      cholecalciferol 125 mcg (5,000 units) capsule  Commonly known as: Vitamin D-3  Additional Medication Adjustments for Surgery: Take last dose 7 days before surgery     cyanocobalamin 100 mcg tablet  Commonly known as: Vitamin B-12  Additional Medication Adjustments for Surgery: Take last dose 7 days before surgery     diphenhydrAMINE 25 mg tablet  Commonly known as: Sominex  Medication Adjustments for Surgery: Take/Use as prescribed     Eliquis 5 mg tablet  Generic drug: apixaban  Take 1 tablet (5 mg) by mouth 2 times a day. Do not fill before January 27, 2025.  Medication Adjustments for Surgery: Take last dose 3 days before surgery  Notes to patient: Last dose will be 4/6/25 PM dose     Fish OiL 1,200 (144-216) mg capsule  Generic drug: omega 3-dha-epa-fish oil  Additional Medication Adjustments for Surgery: Take last dose 7 days before surgery     levothyroxine 100 mcg tablet  Commonly known as: Synthroid, Levoxyl  Take 1 tablet (100 mcg) by mouth early in the morning.. Take on an empty stomach at the same time each day, either 30 to 60 minutes prior to breakfast  Medication Adjustments for Surgery: Take on the morning of surgery     metFORMIN 500 mg tablet  Commonly known as: Glucophage  Take 2 tablets (1,000 mg) by mouth 2 times daily (morning and late afternoon). Do not fill before January 22, 2025.  Medication Adjustments for Surgery: Do Not take on the morning of surgery     pantoprazole 40 mg EC tablet  Commonly known as: ProtoNix  Medication Adjustments for Surgery: Take/Use as prescribed        rosuvastatin 20 mg tablet  Commonly known as: Crestor  Take 1 tablet (20 mg) by mouth once daily.  Medication Adjustments for Surgery: Take/Use as prescribed                       Why must I stop eating and drinking near surgery time?  With sedation, food or liquid in your stomach can  enter your lungs causing serious complications  Increases nausea and vomiting    When do I need to stop eating and drinking before my surgery?   Do not eat or drink after midnight the night before your surgery/procedure.  You may have small sips of water to take your medication.    PAT DISCHARGE INSTRUCTIONS    Please call the Same Day Surgery (SDS) Department of the hospital where your procedure will be performed after 2:00 PM the day before your surgery. If you are scheduled on a Monday, or a Tuesday following a Monday holiday, you will need to call on the last business day prior to your surgery.    [facility]  [address] [place] [address] [phone]  [facility]  [address] [address], [address] [phone]  [facility]  [address] [facility].  [place] [id number] [phone]    Please let your surgeon know if:      You develop any open sores, shingles, burning or painful urination as these may increase your risk of an infection.   You no longer wish to have the surgery.   Any other personal circumstances change that may lead to the need to cancel or defer this surgery-such as being sick or getting admitted to any hospital within one week of your planned procedure.    Your contact details change, such as a change of address or phone number.    Starting now:     Please DO NOT drink alcohol or smoke for 24 hours before surgery. It is well known that quitting smoking can make a huge difference to your health and recovery from surgery. The longer you abstain from smoking, the better your chances of a healthy recovery. If you need help with quitting, call 800-QUIT-NOW to be connected to a trained counselor who will discuss the best methods to help you quit.     Before your surgery:    Please stop all supplements 7 days prior to surgery. Or as directed by your  surgeon.   Please stop taking NSAID pain medicine such as Advil and Motrin 7 days before surgery.    If you develop any fever, cough, cold, rashes, cuts, scratches, scrapes, urinary symptoms or infection anywhere on your body (including teeth and gums) prior to surgery, please call your surgeon’s office as soon as possible. This may require treatment to reduce the chance of cancellation on the day of surgery.    The day before your surgery:   DIET- Please follow the diet instructions at the top of your packet.   Get a good night’s rest.  Use the special soap for bathing if you have been instructed to use one.    Scheduled surgery times may change and you will be notified if this occurs - please check your personal voicemail for any updates.     On the morning of surgery:   Wear comfortable, loose fitting clothes which open in the front. Please do not wear moisturizers, creams, lotions, makeup or perfume.    Please bring with you to surgery:   Photo ID and insurance card   Current list of medicines and allergies   Pacemaker/ Defibrillator/Heart stent cards   CPAP machine and mask    Slings/ splints/ crutches   A copy of your complete advanced directive/DHPOA.    Please do NOT bring with you to surgery:   All jewelry and valuables should be left at home.   Prosthetic devices such as contact lenses, hearing aids, dentures, eyelash extensions, hairpins and body piercings must be removed prior to going in to the surgical suite.    After outpatient surgery:   A responsible adult MUST accompany you at the time of discharge and stay with you for 24 hours after your surgery. You may NOT drive yourself home after surgery.    Do not drive, operate machinery, make critical decisions or do activities that require co-ordination or balance until after a night’s sleep.   Do not drink alcoholic beverages for 24 hours.   Instructions for resuming your medications will be provided by your surgeon.    CALL YOUR DOCTOR AFTER SURGERY IF  YOU HAVE:     Chills and/or a fever of 101° F or higher.    Redness, swelling, pus or drainage from your surgical wound or a bad smell from the wound.    Lightheadedness, fainting or confusion.    Persistent vomiting (throwing up) and are not able to eat or drink for 12 hours.    Three or more loose, watery bowel movements in 24 hours (diarrhea).   Difficulty or pain while urinating( after non-urological surgery)    Pain and swelling in your legs, especially if it is only on one side.    Difficulty breathing or are breathing faster than normal.    Any new concerning symptoms.

## 2025-03-27 NOTE — H&P (VIEW-ONLY)
CPM/PAT Evaluation       Name: Latosha Larson (Latosha Larson)  /Age: 1950/75 y.o.     In-Person       Chief Complaint: hyperparathyroidism    HPI    Pt is a 75 year old female with left parathyroid adenoma. Pt has been followed by her endocrinologist and ENT physician.  Pt has completed imaging and parathyroid mass biopsy. Pt denies SOB or dysphagia. Pt does report some voice hoarseness. Pt was examined by her ENT physician and has been scheduled for a parathyroidectomy. Pt denies CP, SOB, or dizziness.     Past Medical History:   Diagnosis Date    Allergic rhinitis 2023    Ankle fracture, left 2023    Bladder cancer (Multi)     Body mass index (BMI) 33.0-33.9, adult 2021    Body mass index (BMI) of 33.0 to 33.9 in adult    Bursitis of right knee 10/08/2023    Cardiomegaly     Chronic back pain     Class 2 obesity with body mass index (BMI) of 35.0 to 35.9 in adult 2024    35.31 kg/m²    Closed dislocation of ankle 10/08/2023    Complement 4 deficiency (Multi)     denies    Compression of vein 2023    Depression     Diabetes mellitus (Multi)     Type II    DJD (degenerative joint disease)     DVT (deep venous thrombosis) (Multi)       left leg    Fibromyalgia     GERD (gastroesophageal reflux disease)     History of blood transfusion       after surgery    HLD (hyperlipidemia)     HTN (hypertension)     denies    Hypercalcemia 10/08/2023    Hyperparathyroidism (Multi)     Hypothyroidism     IBS (irritable bowel syndrome)     Kidney stone 10/08/2023    Lactic acidosis 10/08/2023    Lower leg edema 10/08/2023    Lumbar spondylosis     Lung nodules     multiple    Macular degeneration     Malignant neoplasm of bladder     May-Thurner syndrome     OA (osteoarthritis)     Osteopenia     Palpitations 2023    PE (pulmonary thromboembolism) (Multi)         Peptic ulcer 07/10/2008    Pulmonary embolism     Pulmonary nodule     Right upper quadrant pain 2021     Abdominal pain, RUQ    Sepsis (Multi) 10/08/2023    Vitamin D deficiency     Wears hearing aid in both ears        Past Surgical History:   Procedure Laterality Date    BALLOON ANGIOPLASTY, ARTERY      iliac artery stent LLE    BLADDER TUMOR EXCISION  2022    Transurethral resection of bladder tumor    CATARACT EXTRACTION W/  INTRAOCULAR LENS IMPLANT Bilateral     Dr Alfonso    CERVICAL BIOPSY  W/ LOOP ELECTRODE EXCISION      cone biopsy     SECTION, LOW TRANSVERSE       section x3    COLONOSCOPY      CT ABDOMEN PELVIS ANGIOGRAM W AND/OR WO IV CONTRAST  2020    CT ABDOMEN PELVIS ANGIOGRAM W AND/OR WO IV CONTRAST [facility]    CYSTOSCOPY      with TURBT  multiple    IR INTERVENTION FILTER PLACEMENT      IVC FILTER RETRIEVAL      ORIF ANKLE FRACTURE Left     TONSILLECTOMY      VARICOSE VEIN SURGERY Left     varicose vein ligation     Social History     Tobacco Use    Smoking status: Former     Current packs/day: 0.00     Average packs/day: 1 pack/day for 20.0 years (20.0 ttl pk-yrs)     Types: Cigarettes     Start date:      Quit date:      Years since quittin.2    Smokeless tobacco: Never   Substance Use Topics    Alcohol use: Yes     Comment: Occasionally     Social History     Substance and Sexual Activity   Drug Use Not Currently    Types: Marijuana    Comment: back in high school       Patient  reports that she is not currently sexually active.    Family History   Problem Relation Name Age of Onset    Lupus Mother      Heart disease Mother      Heart disease Father      Cancer Father      Breast cancer Paternal Grandmother         Allergies   Allergen Reactions    Flexeril [Cyclobenzaprine] Hives, Rash and Other    Augmentin [Amoxicillin-Pot Clavulanate] Other and Nausea/vomiting    Bee Venom Protein (Honey Bee) Swelling    Pneumococcal 23-Katelyn Ps Vaccine Other and Hives    Venom-Wasp Swelling    Venom-Wasp Protein Swelling     Current Outpatient Medications    Medication Sig Dispense Refill    cholecalciferol (Vitamin D-3) 125 MCG (5000 UT) capsule Take 1 capsule (125 mcg) by mouth once daily.      cyanocobalamin (Vitamin B-12) 100 mcg tablet Take 1 tablet (100 mcg) by mouth once daily.      diphenhydrAMINE (Sominex) 25 mg tablet Take 2 tablets (50 mg) by mouth as needed at bedtime for sleep.      Eliquis 5 mg tablet Take 1 tablet (5 mg) by mouth 2 times a day. Do not fill before January 27, 2025.      levothyroxine (Synthroid, Levoxyl) 100 mcg tablet Take 1 tablet (100 mcg) by mouth early in the morning.. Take on an empty stomach at the same time each day, either 30 to 60 minutes prior to breakfast 90 tablet 3    metFORMIN (Glucophage) 500 mg tablet Take 2 tablets (1,000 mg) by mouth 2 times daily (morning and late afternoon). Do not fill before January 22, 2025.      omega 3-dha-epa-fish oil (Fish OiL) 1,200 (144-216) mg capsule Take 1 capsule (1,200 mg) by mouth once daily.      pantoprazole (ProtoNix) 40 mg EC tablet Take 1 tablet (40 mg) by mouth once daily in the morning. Take before meals.      rosuvastatin (Crestor) 20 mg tablet Take 1 tablet (20 mg) by mouth once daily. 90 tablet 3    betamethasone dipropionate (Diprosone) 0.05 % lotion once daily. (Patient not taking: Reported on 3/27/2025)      blood sugar diagnostic strip 1 each 3 times a day. 300 each 3    phenazopyridine (Pyridium) 100 mg tablet Take 1 tablet (100 mg) by mouth 3 times a day as needed for bladder spasms. (Patient not taking: Reported on 3/27/2025) 10 tablet 0    traMADol (Ultram) 50 mg tablet Take 1 tablet (50 mg) by mouth every 8 hours if needed for severe pain (7 - 10). (Patient not taking: Reported on 3/27/2025) 10 tablet 0     No current facility-administered medications for this visit.     Review of Systems   Constitutional: Negative.    HENT: Negative.     Eyes: Negative.    Respiratory: Negative.     Cardiovascular:  Positive for palpitations (H/O  occasional heart palpitation).  "  Gastrointestinal: Negative.    Endocrine: Negative.    Genitourinary: Negative.         H/O bladder cancer   Musculoskeletal:  Positive for arthralgias.   Skin: Negative.    Neurological: Negative.    Hematological: Negative.    Psychiatric/Behavioral: Negative.       Physical Exam  Vitals reviewed.   Constitutional:       Appearance: Normal appearance. She is obese.   HENT:      Head: Normocephalic and atraumatic.      Nose: Nose normal.      Mouth/Throat:      Mouth: Mucous membranes are moist.      Pharynx: Oropharynx is clear.   Eyes:      Extraocular Movements: Extraocular movements intact.      Conjunctiva/sclera: Conjunctivae normal.      Pupils: Pupils are equal, round, and reactive to light.   Cardiovascular:      Rate and Rhythm: Normal rate and regular rhythm.      Pulses: Normal pulses.      Heart sounds: Normal heart sounds.   Pulmonary:      Effort: Pulmonary effort is normal.      Breath sounds: Normal breath sounds.   Abdominal:      General: Bowel sounds are normal.      Palpations: Abdomen is soft.   Musculoskeletal:         General: Normal range of motion.      Cervical back: Normal range of motion and neck supple.      Right lower leg: Edema (trace edema) present.      Left lower leg: Edema (trace edema) present.   Skin:     General: Skin is warm and dry.   Neurological:      General: No focal deficit present.      Mental Status: She is alert and oriented to person, place, and time. Mental status is at baseline.   Psychiatric:         Mood and Affect: Mood normal.         Behavior: Behavior normal.         Thought Content: Thought content normal.         Judgment: Judgment normal.          PAT AIRWAY:   Airway:     Mallampati::  III    TM distance::  >3 FB    Neck ROM::  Full  normal      Visit Vitals  /86   Pulse 87   Temp 36.8 °C (98.2 °F) (Temporal)   Resp 16   Ht 1.626 m (5' 4\")   Wt 85.3 kg (188 lb)   LMP  (LMP Unknown)   SpO2 99%   BMI 32.27 kg/m²   OB Status Postmenopausal "   Smoking Status Former   BSA 1.96 m²     ASA: 3  CHADS: 2.8%  RCRI: 0.9%  Ariscat: 1.6%  DASI Risk Score      Flowsheet Row Pre-Admission Testing from 3/27/2025 in [facility] Pre-Admission Testing from 1/14/2025 in [facility]   Can you take care of yourself (eat, dress, bathe, or use toilet)?  2.75 filed at 03/27/2025 1331 2.75 filed at 01/14/2025 1103   Can you walk indoors, such as around your house? 1.75 filed at 03/27/2025 1331 1.75 filed at 01/14/2025 1103   Can you walk a block or two on level ground?  2.75 filed at 03/27/2025 1331 2.75 filed at 01/14/2025 1103   Can you climb a flight of stairs or walk up a hill? 5.5 filed at 03/27/2025 1331 5.5 filed at 01/14/2025 1103   Can you run a short distance? 0 filed at 03/27/2025 1331 0 filed at 01/14/2025 1103   Can you do light work around the house like dusting or washing dishes? 2.7 filed at 03/27/2025 1331 2.7 filed at 01/14/2025 1103   Can you do moderate work around the house like vacuuming, sweeping floors or carrying groceries? 3.5 filed at 03/27/2025 1331 3.5 filed at 01/14/2025 1103   Can you do heavy work around the house like scrubbing floors or lifting and moving heavy furniture?  0 filed at 03/27/2025 1331 8 filed at 01/14/2025 1103   Can you do yard work like raking leaves, weeding or pushing a mower? 0 filed at 03/27/2025 1331 0 filed at 01/14/2025 1103   Can you have sexual relations? 5.25 filed at 03/27/2025 1331 0 filed at 01/14/2025 1103   Can you participate in moderate recreational activities like golf, bowling, dancing, doubles tennis or throwing a baseball or football? 0 filed at 03/27/2025 1331 0 filed at 01/14/2025 1103   Can you participate in strenous sports like swimming, singles tennis, football, basketball, or skiing? 0 filed at 03/27/2025 1331 0 filed at 01/14/2025 1103   DASI SCORE 24.2 filed at 03/27/2025 1331 26.95 filed at 01/14/2025 1103   METS Score (Will be calculated only when all the questions  are answered) 5.7 filed at 03/27/2025 1331 6.1 filed at 01/14/2025 1103          Caprini DVT Assessment      Flowsheet Row Pre-Admission Testing from 1/14/2025 in [facility] Ultrasound Guided Procedure from 1/2/2025 in [facility] with Demetri Gonzales MD, Lida Sharp, ALEKSANDAR and Mallika Damico RN   DVT Score (IF A SCORE IS NOT CALCULATING, MUST SELECT A BMI TO COMPLETE) 9 filed at 01/14/2025 1132 5 filed at 01/02/2025 0948   Medical Factors History of DVT/PE filed at 01/14/2025 1132 --   Surgical Factors Minor surgery planned filed at 01/14/2025 1132 --   BMI (BMI MUST BE CHOSEN) 31-40 (Obesity) filed at 01/14/2025 1132 31-40 (Obesity) filed at 01/02/2025 0948          Modified Frailty Index    No data to display       [id number] Stroke Risk Points  Current as of just now        N/A 0 to 9 Points:      Last Change: N/A          The [id number] risk score (Lip GH, et al. 2009. © 2010 [employer]) quantifies the risk of stroke for a patient with atrial fibrillation. For patients without atrial fibrillation or under the age of 18 this score appears as N/A. Higher score values generally indicate higher risk of stroke.        This score is not applicable to this patient. Components are not calculated.          Revised Cardiac Risk Index      Flowsheet Row Pre-Admission Testing from 3/27/2025 in [facility] Pre-Admission Testing from 1/14/2025 in [facility]   High-Risk Surgery (Intraperitoneal, Intrathoracic,Suprainguinal vascular) 1 filed at 03/27/2025 1416 0 filed at 01/14/2025 1144   History of ischemic heart disease (History of MI, History of positive exercuse test, Current chest paint considered due to myocardial ischemia, Use of nitrate therapy, ECG with pathological Q Waves) 0 filed at 03/27/2025 1416 0 filed at 01/14/2025 1144   History of congestive heart failure (pulmonary edemia, bilateral rales or S3 gallop, Paroxysmal  nocturnal dyspnea, CXR showing pulmonary vascular redistribution) 0 filed at 03/27/2025 1416 0 filed at 01/14/2025 1144   History of cerebrovascular disease (Prior TIA or stroke) 0 filed at 03/27/2025 1416 0 filed at 01/14/2025 1144   Pre-operative insulin treatment 0 filed at 03/27/2025 1416 0 filed at 01/14/2025 1144   Pre-operative creatinine>2 mg/dl 0 filed at 03/27/2025 1416 0 filed at 01/14/2025 1144   Revised Cardiac Risk Calculator 1 filed at 03/27/2025 1416 0 filed at 01/14/2025 1144          Apfel Simplified Score      Flowsheet Row Pre-Admission Testing from 2/27/2024 in [facility]   Smoking status 1 filed at 02/27/2024 1356   History of motion sickness or PONV  0 filed at 02/27/2024 1356   Use of postoperative opioids 1 filed at 02/27/2024 1356   Apfel Simplified Score Calculator 3 filed at 02/27/2024 1356          Risk Analysis Index Results This Encounter    No data found in the last 10 encounters.       Stop Bang Score      Flowsheet Row Pre-Admission Testing from 3/27/2025 in [facility] Pre-Admission Testing from 1/14/2025 in [facility]   Do you snore loudly? 0 filed at 03/27/2025 1331 0 filed at 01/14/2025 1103   Do you often feel tired or fatigued after your sleep? 1 filed at 03/27/2025 1331 0 filed at 01/14/2025 1103   Has anyone ever observed you stop breathing in your sleep? 0 filed at 03/27/2025 1331 0 filed at 01/14/2025 1103   Do you have or are you being treated for high blood pressure? 0 filed at 03/27/2025 1331 0 filed at 01/14/2025 1103   Recent BMI (Calculated) 32.3 filed at 03/27/2025 1331 33.3 filed at 01/14/2025 1103   Is BMI greater than 35 kg/m2? 0=No filed at 03/27/2025 1331 0=No filed at 01/14/2025 1103   Age older than 50 years old? 1=Yes filed at 03/27/2025 1331 1=Yes filed at 01/14/2025 1103   Is your neck circumference greater than 17 inches (Male) or 16 inches (Female)? 0 filed at 03/27/2025 1331 0 filed at 01/14/2025 1103   Gender  - Male 0=No filed at 03/27/2025 1331 0=No filed at 01/14/2025 1103   STOP-BANG Total Score 2 filed at 03/27/2025 1331 1 filed at 01/14/2025 1103          Prodigy: High Risk  Total Score: 15              Prodigy Age Score      Prodigy Previous Opioid Use Score           ARISCAT Score for Postoperative Pulmonary Complications      Flowsheet Row Pre-Admission Testing from 1/14/2025 in [facility]   Age Calculated Score 3 filed at 01/14/2025 1146   Preoperative SpO2 0 filed at 01/14/2025 1146   Respiratory infection in the last month Either upper or lower (i.e., URI, bronchitis, pneumonia), with fever and antibiotic treatment 0 filed at 01/14/2025 1146   Preoperative anemia (Hgb less than 10 g/dl) 0 filed at 01/14/2025 1146   Surgical incision  0 filed at 01/14/2025 1146   Duration of surgery  0 filed at 01/14/2025 1146   Emergency Procedure  0 filed at 01/14/2025 1146   ARISCAT Total Score  3 filed at 01/14/2025 1146          Ricci Perioperative Risk for Myocardial Infarction or Cardiac Arrest (MIC)      Flowsheet Row Pre-Admission Testing from 1/14/2025 in [facility]   Calculated Age Score 1.46 filed at 01/14/2025 1146   Functional Status  0 filed at 01/14/2025 1146   ASA Class  -3.29 filed at 01/14/2025 1146   Creatinine 0 filed at 01/14/2025 1146   Type of Procedure  -0.26 filed at 01/14/2025 1146   MIC Total Score  -7.34 filed at 01/14/2025 1146   MIC % 0.06 filed at 01/14/2025 1146            Assessment and Plan:     Primary hyperparathyroidism: Parathyroidectomy   DM2: pt is taking Metformin. Checking HgbA1C in PAT.   Hypothyroidism: pt is taking levothyroxine. 3/5/2025 TSH: 0.17, Free thyroxine: 1.8.   Hyperlipidemia: pt is taking rosuvastatin.   GERD: Pt is taking pantoprazole.   H/O bladder cancer: s/p Multiple TURBTs. Pt has history of BCG treatments. Pt is followed by a urologist.   H/O May Thurner's syndrome s/p left iliac artery stent placement. Complicated by retroperitoneal  bleed requiring blood transfusion. Pt is followed by Vascular Dr Anupama Frost.   PE/ LLE DVT s/p IVC filter placed and later retrieved. Pt is taking Eliquis.  Pt is followed by Vascular Dr Anupama Frost.   Osteopenia  Allergic rhinitis  fibromyalgia  BMI: 32.27    BMP and HgbA1C collected in PAT.  CBC completed on 1/14/2025.    EKG completed on 1/14/2025.    Evelin Dumont, APRN-CNP

## 2025-03-27 NOTE — CPM/PAT H&P
CPM/PAT Evaluation       Name: Latosha Larson (Latosha Larson)  /Age: 1950/75 y.o.     In-Person       Chief Complaint: hyperparathyroidism    HPI    Pt is a 75 year old female with left parathyroid adenoma. Pt has been followed by her endocrinologist and ENT physician.  Pt has completed imaging and parathyroid mass biopsy. Pt denies SOB or dysphagia. Pt does report some voice hoarseness. Pt was examined by her ENT physician and has been scheduled for a parathyroidectomy. Pt denies CP, SOB, or dizziness.     Past Medical History:   Diagnosis Date    Allergic rhinitis 2023    Ankle fracture, left 2023    Bladder cancer (Multi)     Body mass index (BMI) 33.0-33.9, adult 2021    Body mass index (BMI) of 33.0 to 33.9 in adult    Bursitis of right knee 10/08/2023    Cardiomegaly     Chronic back pain     Class 2 obesity with body mass index (BMI) of 35.0 to 35.9 in adult 2024    35.31 kg/m²    Closed dislocation of ankle 10/08/2023    Complement 4 deficiency (Multi)     denies    Compression of vein 2023    Depression     Diabetes mellitus (Multi)     Type II    DJD (degenerative joint disease)     DVT (deep venous thrombosis) (Multi)       left leg    Fibromyalgia     GERD (gastroesophageal reflux disease)     History of blood transfusion       after surgery    HLD (hyperlipidemia)     HTN (hypertension)     denies    Hypercalcemia 10/08/2023    Hyperparathyroidism (Multi)     Hypothyroidism     IBS (irritable bowel syndrome)     Kidney stone 10/08/2023    Lactic acidosis 10/08/2023    Lower leg edema 10/08/2023    Lumbar spondylosis     Lung nodules     multiple    Macular degeneration     Malignant neoplasm of bladder     May-Thurner syndrome     OA (osteoarthritis)     Osteopenia     Palpitations 2023    PE (pulmonary thromboembolism) (Multi)         Peptic ulcer 07/10/2008    Pulmonary embolism     Pulmonary nodule     Right upper quadrant pain 2021     Abdominal pain, RUQ    Sepsis (Multi) 10/08/2023    Vitamin D deficiency     Wears hearing aid in both ears        Past Surgical History:   Procedure Laterality Date    BALLOON ANGIOPLASTY, ARTERY      iliac artery stent LLE    BLADDER TUMOR EXCISION  2022    Transurethral resection of bladder tumor    CATARACT EXTRACTION W/  INTRAOCULAR LENS IMPLANT Bilateral     Dr Alfonso    CERVICAL BIOPSY  W/ LOOP ELECTRODE EXCISION      cone biopsy     SECTION, LOW TRANSVERSE       section x3    COLONOSCOPY      CT ABDOMEN PELVIS ANGIOGRAM W AND/OR WO IV CONTRAST  2020    CT ABDOMEN PELVIS ANGIOGRAM W AND/OR WO IV CONTRAST [facility]    CYSTOSCOPY      with TURBT  multiple    IR INTERVENTION FILTER PLACEMENT      IVC FILTER RETRIEVAL      ORIF ANKLE FRACTURE Left     TONSILLECTOMY      VARICOSE VEIN SURGERY Left     varicose vein ligation     Social History     Tobacco Use    Smoking status: Former     Current packs/day: 0.00     Average packs/day: 1 pack/day for 20.0 years (20.0 ttl pk-yrs)     Types: Cigarettes     Start date:      Quit date:      Years since quittin.2    Smokeless tobacco: Never   Substance Use Topics    Alcohol use: Yes     Comment: Occasionally     Social History     Substance and Sexual Activity   Drug Use Not Currently    Types: Marijuana    Comment: back in high school       Patient  reports that she is not currently sexually active.    Family History   Problem Relation Name Age of Onset    Lupus Mother      Heart disease Mother      Heart disease Father      Cancer Father      Breast cancer Paternal Grandmother         Allergies   Allergen Reactions    Flexeril [Cyclobenzaprine] Hives, Rash and Other    Augmentin [Amoxicillin-Pot Clavulanate] Other and Nausea/vomiting    Bee Venom Protein (Honey Bee) Swelling    Pneumococcal 23-Katelyn Ps Vaccine Other and Hives    Venom-Wasp Swelling    Venom-Wasp Protein Swelling     Current Outpatient Medications    Medication Sig Dispense Refill    cholecalciferol (Vitamin D-3) 125 MCG (5000 UT) capsule Take 1 capsule (125 mcg) by mouth once daily.      cyanocobalamin (Vitamin B-12) 100 mcg tablet Take 1 tablet (100 mcg) by mouth once daily.      diphenhydrAMINE (Sominex) 25 mg tablet Take 2 tablets (50 mg) by mouth as needed at bedtime for sleep.      Eliquis 5 mg tablet Take 1 tablet (5 mg) by mouth 2 times a day. Do not fill before January 27, 2025.      levothyroxine (Synthroid, Levoxyl) 100 mcg tablet Take 1 tablet (100 mcg) by mouth early in the morning.. Take on an empty stomach at the same time each day, either 30 to 60 minutes prior to breakfast 90 tablet 3    metFORMIN (Glucophage) 500 mg tablet Take 2 tablets (1,000 mg) by mouth 2 times daily (morning and late afternoon). Do not fill before January 22, 2025.      omega 3-dha-epa-fish oil (Fish OiL) 1,200 (144-216) mg capsule Take 1 capsule (1,200 mg) by mouth once daily.      pantoprazole (ProtoNix) 40 mg EC tablet Take 1 tablet (40 mg) by mouth once daily in the morning. Take before meals.      rosuvastatin (Crestor) 20 mg tablet Take 1 tablet (20 mg) by mouth once daily. 90 tablet 3    betamethasone dipropionate (Diprosone) 0.05 % lotion once daily. (Patient not taking: Reported on 3/27/2025)      blood sugar diagnostic strip 1 each 3 times a day. 300 each 3    phenazopyridine (Pyridium) 100 mg tablet Take 1 tablet (100 mg) by mouth 3 times a day as needed for bladder spasms. (Patient not taking: Reported on 3/27/2025) 10 tablet 0    traMADol (Ultram) 50 mg tablet Take 1 tablet (50 mg) by mouth every 8 hours if needed for severe pain (7 - 10). (Patient not taking: Reported on 3/27/2025) 10 tablet 0     No current facility-administered medications for this visit.     Review of Systems   Constitutional: Negative.    HENT: Negative.     Eyes: Negative.    Respiratory: Negative.     Cardiovascular:  Positive for palpitations (H/O  occasional heart palpitation).  "  Gastrointestinal: Negative.    Endocrine: Negative.    Genitourinary: Negative.         H/O bladder cancer   Musculoskeletal:  Positive for arthralgias.   Skin: Negative.    Neurological: Negative.    Hematological: Negative.    Psychiatric/Behavioral: Negative.       Physical Exam  Vitals reviewed.   Constitutional:       Appearance: Normal appearance. She is obese.   HENT:      Head: Normocephalic and atraumatic.      Nose: Nose normal.      Mouth/Throat:      Mouth: Mucous membranes are moist.      Pharynx: Oropharynx is clear.   Eyes:      Extraocular Movements: Extraocular movements intact.      Conjunctiva/sclera: Conjunctivae normal.      Pupils: Pupils are equal, round, and reactive to light.   Cardiovascular:      Rate and Rhythm: Normal rate and regular rhythm.      Pulses: Normal pulses.      Heart sounds: Normal heart sounds.   Pulmonary:      Effort: Pulmonary effort is normal.      Breath sounds: Normal breath sounds.   Abdominal:      General: Bowel sounds are normal.      Palpations: Abdomen is soft.   Musculoskeletal:         General: Normal range of motion.      Cervical back: Normal range of motion and neck supple.      Right lower leg: Edema (trace edema) present.      Left lower leg: Edema (trace edema) present.   Skin:     General: Skin is warm and dry.   Neurological:      General: No focal deficit present.      Mental Status: She is alert and oriented to person, place, and time. Mental status is at baseline.   Psychiatric:         Mood and Affect: Mood normal.         Behavior: Behavior normal.         Thought Content: Thought content normal.         Judgment: Judgment normal.          PAT AIRWAY:   Airway:     Mallampati::  III    TM distance::  >3 FB    Neck ROM::  Full  normal      Visit Vitals  /86   Pulse 87   Temp 36.8 °C (98.2 °F) (Temporal)   Resp 16   Ht 1.626 m (5' 4\")   Wt 85.3 kg (188 lb)   LMP  (LMP Unknown)   SpO2 99%   BMI 32.27 kg/m²   OB Status Postmenopausal "   Smoking Status Former   BSA 1.96 m²     ASA: 3  CHADS: 2.8%  RCRI: 0.9%  Ariscat: 1.6%  DASI Risk Score      Flowsheet Row Pre-Admission Testing from 3/27/2025 in [facility] Pre-Admission Testing from 1/14/2025 in [facility]   Can you take care of yourself (eat, dress, bathe, or use toilet)?  2.75 filed at 03/27/2025 1331 2.75 filed at 01/14/2025 1103   Can you walk indoors, such as around your house? 1.75 filed at 03/27/2025 1331 1.75 filed at 01/14/2025 1103   Can you walk a block or two on level ground?  2.75 filed at 03/27/2025 1331 2.75 filed at 01/14/2025 1103   Can you climb a flight of stairs or walk up a hill? 5.5 filed at 03/27/2025 1331 5.5 filed at 01/14/2025 1103   Can you run a short distance? 0 filed at 03/27/2025 1331 0 filed at 01/14/2025 1103   Can you do light work around the house like dusting or washing dishes? 2.7 filed at 03/27/2025 1331 2.7 filed at 01/14/2025 1103   Can you do moderate work around the house like vacuuming, sweeping floors or carrying groceries? 3.5 filed at 03/27/2025 1331 3.5 filed at 01/14/2025 1103   Can you do heavy work around the house like scrubbing floors or lifting and moving heavy furniture?  0 filed at 03/27/2025 1331 8 filed at 01/14/2025 1103   Can you do yard work like raking leaves, weeding or pushing a mower? 0 filed at 03/27/2025 1331 0 filed at 01/14/2025 1103   Can you have sexual relations? 5.25 filed at 03/27/2025 1331 0 filed at 01/14/2025 1103   Can you participate in moderate recreational activities like golf, bowling, dancing, doubles tennis or throwing a baseball or football? 0 filed at 03/27/2025 1331 0 filed at 01/14/2025 1103   Can you participate in strenous sports like swimming, singles tennis, football, basketball, or skiing? 0 filed at 03/27/2025 1331 0 filed at 01/14/2025 1103   DASI SCORE 24.2 filed at 03/27/2025 1331 26.95 filed at 01/14/2025 1103   METS Score (Will be calculated only when all the questions  are answered) 5.7 filed at 03/27/2025 1331 6.1 filed at 01/14/2025 1103          Caprini DVT Assessment      Flowsheet Row Pre-Admission Testing from 1/14/2025 in [facility] Ultrasound Guided Procedure from 1/2/2025 in [facility] with Demetri Gonzales MD, Lida Sharp, ALEKSANDAR and Mallika Damico RN   DVT Score (IF A SCORE IS NOT CALCULATING, MUST SELECT A BMI TO COMPLETE) 9 filed at 01/14/2025 1132 5 filed at 01/02/2025 0948   Medical Factors History of DVT/PE filed at 01/14/2025 1132 --   Surgical Factors Minor surgery planned filed at 01/14/2025 1132 --   BMI (BMI MUST BE CHOSEN) 31-40 (Obesity) filed at 01/14/2025 1132 31-40 (Obesity) filed at 01/02/2025 0948          Modified Frailty Index    No data to display       [id number] Stroke Risk Points  Current as of just now        N/A 0 to 9 Points:      Last Change: N/A          The [id number] risk score (Lip GH, et al. 2009. © 2010 [employer]) quantifies the risk of stroke for a patient with atrial fibrillation. For patients without atrial fibrillation or under the age of 18 this score appears as N/A. Higher score values generally indicate higher risk of stroke.        This score is not applicable to this patient. Components are not calculated.          Revised Cardiac Risk Index      Flowsheet Row Pre-Admission Testing from 3/27/2025 in [facility] Pre-Admission Testing from 1/14/2025 in [facility]   High-Risk Surgery (Intraperitoneal, Intrathoracic,Suprainguinal vascular) 1 filed at 03/27/2025 1416 0 filed at 01/14/2025 1144   History of ischemic heart disease (History of MI, History of positive exercuse test, Current chest paint considered due to myocardial ischemia, Use of nitrate therapy, ECG with pathological Q Waves) 0 filed at 03/27/2025 1416 0 filed at 01/14/2025 1144   History of congestive heart failure (pulmonary edemia, bilateral rales or S3 gallop, Paroxysmal  nocturnal dyspnea, CXR showing pulmonary vascular redistribution) 0 filed at 03/27/2025 1416 0 filed at 01/14/2025 1144   History of cerebrovascular disease (Prior TIA or stroke) 0 filed at 03/27/2025 1416 0 filed at 01/14/2025 1144   Pre-operative insulin treatment 0 filed at 03/27/2025 1416 0 filed at 01/14/2025 1144   Pre-operative creatinine>2 mg/dl 0 filed at 03/27/2025 1416 0 filed at 01/14/2025 1144   Revised Cardiac Risk Calculator 1 filed at 03/27/2025 1416 0 filed at 01/14/2025 1144          Apfel Simplified Score      Flowsheet Row Pre-Admission Testing from 2/27/2024 in [facility]   Smoking status 1 filed at 02/27/2024 1356   History of motion sickness or PONV  0 filed at 02/27/2024 1356   Use of postoperative opioids 1 filed at 02/27/2024 1356   Apfel Simplified Score Calculator 3 filed at 02/27/2024 1356          Risk Analysis Index Results This Encounter    No data found in the last 10 encounters.       Stop Bang Score      Flowsheet Row Pre-Admission Testing from 3/27/2025 in [facility] Pre-Admission Testing from 1/14/2025 in [facility]   Do you snore loudly? 0 filed at 03/27/2025 1331 0 filed at 01/14/2025 1103   Do you often feel tired or fatigued after your sleep? 1 filed at 03/27/2025 1331 0 filed at 01/14/2025 1103   Has anyone ever observed you stop breathing in your sleep? 0 filed at 03/27/2025 1331 0 filed at 01/14/2025 1103   Do you have or are you being treated for high blood pressure? 0 filed at 03/27/2025 1331 0 filed at 01/14/2025 1103   Recent BMI (Calculated) 32.3 filed at 03/27/2025 1331 33.3 filed at 01/14/2025 1103   Is BMI greater than 35 kg/m2? 0=No filed at 03/27/2025 1331 0=No filed at 01/14/2025 1103   Age older than 50 years old? 1=Yes filed at 03/27/2025 1331 1=Yes filed at 01/14/2025 1103   Is your neck circumference greater than 17 inches (Male) or 16 inches (Female)? 0 filed at 03/27/2025 1331 0 filed at 01/14/2025 1103   Gender  - Male 0=No filed at 03/27/2025 1331 0=No filed at 01/14/2025 1103   STOP-BANG Total Score 2 filed at 03/27/2025 1331 1 filed at 01/14/2025 1103          Prodigy: High Risk  Total Score: 15              Prodigy Age Score      Prodigy Previous Opioid Use Score           ARISCAT Score for Postoperative Pulmonary Complications      Flowsheet Row Pre-Admission Testing from 1/14/2025 in [facility]   Age Calculated Score 3 filed at 01/14/2025 1146   Preoperative SpO2 0 filed at 01/14/2025 1146   Respiratory infection in the last month Either upper or lower (i.e., URI, bronchitis, pneumonia), with fever and antibiotic treatment 0 filed at 01/14/2025 1146   Preoperative anemia (Hgb less than 10 g/dl) 0 filed at 01/14/2025 1146   Surgical incision  0 filed at 01/14/2025 1146   Duration of surgery  0 filed at 01/14/2025 1146   Emergency Procedure  0 filed at 01/14/2025 1146   ARISCAT Total Score  3 filed at 01/14/2025 1146          Ricci Perioperative Risk for Myocardial Infarction or Cardiac Arrest (MIC)      Flowsheet Row Pre-Admission Testing from 1/14/2025 in [facility]   Calculated Age Score 1.46 filed at 01/14/2025 1146   Functional Status  0 filed at 01/14/2025 1146   ASA Class  -3.29 filed at 01/14/2025 1146   Creatinine 0 filed at 01/14/2025 1146   Type of Procedure  -0.26 filed at 01/14/2025 1146   MIC Total Score  -7.34 filed at 01/14/2025 1146   MIC % 0.06 filed at 01/14/2025 1146            Assessment and Plan:     Primary hyperparathyroidism: Parathyroidectomy   DM2: pt is taking Metformin. Checking HgbA1C in PAT.   Hypothyroidism: pt is taking levothyroxine. 3/5/2025 TSH: 0.17, Free thyroxine: 1.8.   Hyperlipidemia: pt is taking rosuvastatin.   GERD: Pt is taking pantoprazole.   H/O bladder cancer: s/p Multiple TURBTs. Pt has history of BCG treatments. Pt is followed by a urologist.   H/O May Thurner's syndrome s/p left iliac artery stent placement. Complicated by retroperitoneal  bleed requiring blood transfusion. Pt is followed by Vascular Dr Anupama Frost.   PE/ LLE DVT s/p IVC filter placed and later retrieved. Pt is taking Eliquis.  Pt is followed by Vascular Dr Anupama Frost.   Osteopenia  Allergic rhinitis  fibromyalgia  BMI: 32.27    BMP and HgbA1C collected in PAT.  CBC completed on 1/14/2025.    EKG completed on 1/14/2025.    Evelin Dumont, APRN-CNP

## 2025-03-28 LAB
EST. AVERAGE GLUCOSE BLD GHB EST-MCNC: 117 MG/DL
HBA1C MFR BLD: 5.7 %

## 2025-04-10 ENCOUNTER — HOSPITAL ENCOUNTER (OUTPATIENT)
Facility: HOSPITAL | Age: 75
Setting detail: OUTPATIENT SURGERY
Discharge: HOME | End: 2025-04-10
Attending: OTOLARYNGOLOGY | Admitting: OTOLARYNGOLOGY
Payer: MEDICARE

## 2025-04-10 ENCOUNTER — ANESTHESIA EVENT (OUTPATIENT)
Dept: OPERATING ROOM | Facility: HOSPITAL | Age: 75
End: 2025-04-10
Payer: MEDICARE

## 2025-04-10 ENCOUNTER — ANESTHESIA (OUTPATIENT)
Dept: OPERATING ROOM | Facility: HOSPITAL | Age: 75
End: 2025-04-10
Payer: MEDICARE

## 2025-04-10 VITALS
BODY MASS INDEX: 32.27 KG/M2 | WEIGHT: 188 LBS | TEMPERATURE: 97.9 F | DIASTOLIC BLOOD PRESSURE: 92 MMHG | SYSTOLIC BLOOD PRESSURE: 157 MMHG | RESPIRATION RATE: 18 BRPM | OXYGEN SATURATION: 99 % | HEART RATE: 95 BPM

## 2025-04-10 DIAGNOSIS — E21.0 PRIMARY HYPERPARATHYROIDISM (MULTI): ICD-10-CM

## 2025-04-10 LAB — GLUCOSE BLD MANUAL STRIP-MCNC: 130 MG/DL (ref 74–99)

## 2025-04-10 PROCEDURE — 3700000001 HC GENERAL ANESTHESIA TIME - INITIAL BASE CHARGE: Performed by: OTOLARYNGOLOGY

## 2025-04-10 PROCEDURE — 2720000007 HC OR 272 NO HCPCS: Performed by: OTOLARYNGOLOGY

## 2025-04-10 PROCEDURE — 7100000009 HC PHASE TWO TIME - INITIAL BASE CHARGE: Performed by: OTOLARYNGOLOGY

## 2025-04-10 PROCEDURE — 7100000002 HC RECOVERY ROOM TIME - EACH INCREMENTAL 1 MINUTE: Performed by: OTOLARYNGOLOGY

## 2025-04-10 PROCEDURE — 2500000004 HC RX 250 GENERAL PHARMACY W/ HCPCS (ALT 636 FOR OP/ED): Performed by: OTOLARYNGOLOGY

## 2025-04-10 PROCEDURE — 7100000001 HC RECOVERY ROOM TIME - INITIAL BASE CHARGE: Performed by: OTOLARYNGOLOGY

## 2025-04-10 PROCEDURE — 38542 EXPLORE DEEP NODE(S) NECK: CPT | Performed by: OTOLARYNGOLOGY

## 2025-04-10 PROCEDURE — 2500000004 HC RX 250 GENERAL PHARMACY W/ HCPCS (ALT 636 FOR OP/ED): Performed by: NURSE ANESTHETIST, CERTIFIED REGISTERED

## 2025-04-10 PROCEDURE — 82947 ASSAY GLUCOSE BLOOD QUANT: CPT

## 2025-04-10 PROCEDURE — 2500000005 HC RX 250 GENERAL PHARMACY W/O HCPCS: Performed by: NURSE ANESTHETIST, CERTIFIED REGISTERED

## 2025-04-10 PROCEDURE — 3600000003 HC OR TIME - INITIAL BASE CHARGE - PROCEDURE LEVEL THREE: Performed by: OTOLARYNGOLOGY

## 2025-04-10 PROCEDURE — 2500000004 HC RX 250 GENERAL PHARMACY W/ HCPCS (ALT 636 FOR OP/ED): Performed by: ANESTHESIOLOGY

## 2025-04-10 PROCEDURE — 3700000002 HC GENERAL ANESTHESIA TIME - EACH INCREMENTAL 1 MINUTE: Performed by: OTOLARYNGOLOGY

## 2025-04-10 PROCEDURE — 7100000010 HC PHASE TWO TIME - EACH INCREMENTAL 1 MINUTE: Performed by: OTOLARYNGOLOGY

## 2025-04-10 PROCEDURE — 3600000008 HC OR TIME - EACH INCREMENTAL 1 MINUTE - PROCEDURE LEVEL THREE: Performed by: OTOLARYNGOLOGY

## 2025-04-10 RX ORDER — HYDROMORPHONE HYDROCHLORIDE 0.2 MG/ML
0.2 INJECTION INTRAMUSCULAR; INTRAVENOUS; SUBCUTANEOUS EVERY 5 MIN PRN
Status: DISCONTINUED | OUTPATIENT
Start: 2025-04-10 | End: 2025-04-10 | Stop reason: HOSPADM

## 2025-04-10 RX ORDER — FENTANYL CITRATE 50 UG/ML
50 INJECTION, SOLUTION INTRAMUSCULAR; INTRAVENOUS EVERY 5 MIN PRN
Status: DISCONTINUED | OUTPATIENT
Start: 2025-04-10 | End: 2025-04-10 | Stop reason: HOSPADM

## 2025-04-10 RX ORDER — CEFAZOLIN SODIUM 2 G/100ML
2 INJECTION, SOLUTION INTRAVENOUS ONCE
Status: COMPLETED | OUTPATIENT
Start: 2025-04-10 | End: 2025-04-10

## 2025-04-10 RX ORDER — MEPERIDINE HYDROCHLORIDE 25 MG/ML
12.5 INJECTION INTRAMUSCULAR; INTRAVENOUS; SUBCUTANEOUS EVERY 10 MIN PRN
Status: DISCONTINUED | OUTPATIENT
Start: 2025-04-10 | End: 2025-04-10 | Stop reason: HOSPADM

## 2025-04-10 RX ORDER — LIDOCAINE HYDROCHLORIDE AND EPINEPHRINE 10; 10 UG/ML; MG/ML
INJECTION, SOLUTION INFILTRATION; PERINEURAL AS NEEDED
Status: DISCONTINUED | OUTPATIENT
Start: 2025-04-10 | End: 2025-04-10 | Stop reason: HOSPADM

## 2025-04-10 RX ORDER — ONDANSETRON 4 MG/1
4 TABLET, ORALLY DISINTEGRATING ORAL EVERY 6 HOURS
Status: DISCONTINUED | OUTPATIENT
Start: 2025-04-10 | End: 2025-04-10 | Stop reason: HOSPADM

## 2025-04-10 RX ORDER — ALBUTEROL SULFATE 0.83 MG/ML
2.5 SOLUTION RESPIRATORY (INHALATION) ONCE
Status: DISCONTINUED | OUTPATIENT
Start: 2025-04-10 | End: 2025-04-10 | Stop reason: HOSPADM

## 2025-04-10 RX ORDER — PHENYLEPHRINE HYDROCHLORIDE 10 MG/ML
INJECTION INTRAVENOUS AS NEEDED
Status: DISCONTINUED | OUTPATIENT
Start: 2025-04-10 | End: 2025-04-10

## 2025-04-10 RX ORDER — LABETALOL HYDROCHLORIDE 5 MG/ML
5 INJECTION, SOLUTION INTRAVENOUS ONCE AS NEEDED
Status: COMPLETED | OUTPATIENT
Start: 2025-04-10 | End: 2025-04-10

## 2025-04-10 RX ORDER — LIDOCAINE HYDROCHLORIDE 20 MG/ML
INJECTION, SOLUTION INFILTRATION; PERINEURAL AS NEEDED
Status: DISCONTINUED | OUTPATIENT
Start: 2025-04-10 | End: 2025-04-10

## 2025-04-10 RX ORDER — ONDANSETRON HYDROCHLORIDE 2 MG/ML
4 INJECTION, SOLUTION INTRAVENOUS ONCE AS NEEDED
Status: DISCONTINUED | OUTPATIENT
Start: 2025-04-10 | End: 2025-04-10 | Stop reason: HOSPADM

## 2025-04-10 RX ORDER — NORETHINDRONE AND ETHINYL ESTRADIOL 0.5-0.035
KIT ORAL AS NEEDED
Status: DISCONTINUED | OUTPATIENT
Start: 2025-04-10 | End: 2025-04-10

## 2025-04-10 RX ORDER — PROPOFOL 10 MG/ML
INJECTION, EMULSION INTRAVENOUS AS NEEDED
Status: DISCONTINUED | OUTPATIENT
Start: 2025-04-10 | End: 2025-04-10

## 2025-04-10 RX ORDER — SODIUM CHLORIDE, SODIUM LACTATE, POTASSIUM CHLORIDE, CALCIUM CHLORIDE 600; 310; 30; 20 MG/100ML; MG/100ML; MG/100ML; MG/100ML
100 INJECTION, SOLUTION INTRAVENOUS CONTINUOUS
Status: DISCONTINUED | OUTPATIENT
Start: 2025-04-10 | End: 2025-04-10 | Stop reason: HOSPADM

## 2025-04-10 RX ORDER — SUCCINYLCHOLINE CHLORIDE 100 MG/5ML
SYRINGE (ML) INTRAVENOUS AS NEEDED
Status: DISCONTINUED | OUTPATIENT
Start: 2025-04-10 | End: 2025-04-10

## 2025-04-10 RX ORDER — FENTANYL CITRATE 50 UG/ML
INJECTION, SOLUTION INTRAMUSCULAR; INTRAVENOUS AS NEEDED
Status: DISCONTINUED | OUTPATIENT
Start: 2025-04-10 | End: 2025-04-10

## 2025-04-10 RX ORDER — HYDROCODONE BITARTRATE AND ACETAMINOPHEN 5; 325 MG/1; MG/1
1 TABLET ORAL EVERY 6 HOURS PRN
Status: DISCONTINUED | OUTPATIENT
Start: 2025-04-10 | End: 2025-04-10 | Stop reason: HOSPADM

## 2025-04-10 RX ORDER — LIDOCAINE HYDROCHLORIDE 10 MG/ML
0.1 INJECTION, SOLUTION INFILTRATION; PERINEURAL ONCE
Status: DISCONTINUED | OUTPATIENT
Start: 2025-04-10 | End: 2025-04-10 | Stop reason: HOSPADM

## 2025-04-10 RX ORDER — MIDAZOLAM HYDROCHLORIDE 1 MG/ML
1 INJECTION, SOLUTION INTRAMUSCULAR; INTRAVENOUS ONCE AS NEEDED
Status: DISCONTINUED | OUTPATIENT
Start: 2025-04-10 | End: 2025-04-10 | Stop reason: HOSPADM

## 2025-04-10 RX ORDER — ACETAMINOPHEN 325 MG/1
650 TABLET ORAL EVERY 6 HOURS PRN
Status: DISCONTINUED | OUTPATIENT
Start: 2025-04-10 | End: 2025-04-10 | Stop reason: HOSPADM

## 2025-04-10 RX ADMIN — PHENYLEPHRINE HYDROCHLORIDE 200 MCG: 10 INJECTION INTRAVENOUS at 11:08

## 2025-04-10 RX ADMIN — PHENYLEPHRINE HYDROCHLORIDE 300 MCG: 10 INJECTION INTRAVENOUS at 11:22

## 2025-04-10 RX ADMIN — EPHEDRINE SULFATE 10 MG: 50 INJECTION, SOLUTION INTRAVENOUS at 11:34

## 2025-04-10 RX ADMIN — PHENYLEPHRINE HYDROCHLORIDE 200 MCG: 10 INJECTION INTRAVENOUS at 11:20

## 2025-04-10 RX ADMIN — PHENYLEPHRINE HYDROCHLORIDE 100 MCG: 10 INJECTION INTRAVENOUS at 11:34

## 2025-04-10 RX ADMIN — CEFAZOLIN SODIUM 2 G: 2 INJECTION, SOLUTION INTRAVENOUS at 10:57

## 2025-04-10 RX ADMIN — REMIFENTANIL HYDROCHLORIDE 0.1 MCG/KG/MIN: 1 INJECTION, POWDER, LYOPHILIZED, FOR SOLUTION INTRAVENOUS at 10:58

## 2025-04-10 RX ADMIN — SODIUM CHLORIDE, POTASSIUM CHLORIDE, SODIUM LACTATE AND CALCIUM CHLORIDE: 600; 310; 30; 20 INJECTION, SOLUTION INTRAVENOUS at 10:48

## 2025-04-10 RX ADMIN — HYDROMORPHONE HYDROCHLORIDE 0.2 MG: 0.2 INJECTION, SOLUTION INTRAMUSCULAR; INTRAVENOUS; SUBCUTANEOUS at 12:11

## 2025-04-10 RX ADMIN — Medication 160 MG: at 10:53

## 2025-04-10 RX ADMIN — EPHEDRINE SULFATE 5 MG: 50 INJECTION, SOLUTION INTRAVENOUS at 11:26

## 2025-04-10 RX ADMIN — DEXAMETHASONE SODIUM PHOSPHATE 10 MG: 4 INJECTION, SOLUTION INTRAMUSCULAR; INTRAVENOUS at 11:04

## 2025-04-10 RX ADMIN — FENTANYL CITRATE 50 MCG: 50 INJECTION INTRAMUSCULAR; INTRAVENOUS at 12:25

## 2025-04-10 RX ADMIN — LABETALOL HYDROCHLORIDE 5 MG: 5 INJECTION INTRAVENOUS at 13:00

## 2025-04-10 RX ADMIN — FENTANYL CITRATE 50 MCG: 50 INJECTION INTRAMUSCULAR; INTRAVENOUS at 12:36

## 2025-04-10 RX ADMIN — LIDOCAINE HYDROCHLORIDE 2 ML: 20 INJECTION, SOLUTION INFILTRATION; PERINEURAL at 10:53

## 2025-04-10 RX ADMIN — PHENYLEPHRINE HYDROCHLORIDE 200 MCG: 10 INJECTION INTRAVENOUS at 11:18

## 2025-04-10 RX ADMIN — PROPOFOL 200 MG: 10 INJECTION, EMULSION INTRAVENOUS at 10:53

## 2025-04-10 RX ADMIN — FENTANYL CITRATE 50 MCG: 0.05 INJECTION, SOLUTION INTRAMUSCULAR; INTRAVENOUS at 10:53

## 2025-04-10 ASSESSMENT — PAIN - FUNCTIONAL ASSESSMENT
PAIN_FUNCTIONAL_ASSESSMENT: 0-10

## 2025-04-10 ASSESSMENT — COLUMBIA-SUICIDE SEVERITY RATING SCALE - C-SSRS
1. IN THE PAST MONTH, HAVE YOU WISHED YOU WERE DEAD OR WISHED YOU COULD GO TO SLEEP AND NOT WAKE UP?: NO
6. HAVE YOU EVER DONE ANYTHING, STARTED TO DO ANYTHING, OR PREPARED TO DO ANYTHING TO END YOUR LIFE?: NO
2. HAVE YOU ACTUALLY HAD ANY THOUGHTS OF KILLING YOURSELF?: NO

## 2025-04-10 ASSESSMENT — PAIN DESCRIPTION - DESCRIPTORS
DESCRIPTORS: ACHING
DESCRIPTORS: ACHING;SORE
DESCRIPTORS: ACHING;SORE
DESCRIPTORS: SORE
DESCRIPTORS: SORE
DESCRIPTORS: ACHING;SORE
DESCRIPTORS: ACHING

## 2025-04-10 ASSESSMENT — PAIN SCALES - GENERAL
PAINLEVEL_OUTOF10: 7
PAINLEVEL_OUTOF10: 5 - MODERATE PAIN
PAINLEVEL_OUTOF10: 3
PAINLEVEL_OUTOF10: 4
PAINLEVEL_OUTOF10: 3
PAINLEVEL_OUTOF10: 7
PAINLEVEL_OUTOF10: 0 - NO PAIN
PAINLEVEL_OUTOF10: 5 - MODERATE PAIN
PAINLEVEL_OUTOF10: 0 - NO PAIN

## 2025-04-10 NOTE — ANESTHESIA POSTPROCEDURE EVALUATION
Patient: Latosha Larson    Procedure Summary       Date: 04/10/25 Room / Location: [facility] OR 01 / [facility]    Anesthesia Start: 1048 Anesthesia Stop: 1156    Procedure: Left neck exploration (Neck) Diagnosis:       Primary hyperparathyroidism (Multi)      (Primary hyperparathyroidism (Multi) [E21.0])    Surgeons: Eric Rojas MD Responsible Provider: David Oglesby DO    Anesthesia Type: general ASA Status: 3            Anesthesia Type: general    Vitals Value Taken Time   BP  04/10/25 1206   Temp  04/10/25 1206   Pulse  04/10/25 1206   Resp  04/10/25 1206   SpO2  04/10/25 1206       Anesthesia Post Evaluation    Patient location during evaluation: bedside  Patient participation: complete - patient participated  Level of consciousness: awake  Pain management: adequate  Airway patency: patent  Cardiovascular status: acceptable  Respiratory status: acceptable  Hydration status: acceptable  Postoperative Nausea and Vomiting: none    There were no known notable events for this encounter.

## 2025-04-10 NOTE — ANESTHESIA PROCEDURE NOTES
Airway  Date/Time: 4/10/2025 10:55 AM  Urgency: elective    Airway not difficult    Staffing  Performed: CRNA   Authorized by: David Oglesby DO    Performed by: ERICA Esteban-SANA  Patient location during procedure: OR    Indications and Patient Condition  Indications for airway management: anesthesia  Spontaneous ventilation: present  Sedation level: deep  Preoxygenated: yes  Patient position: sniffing  Mask difficulty assessment: 2 - vent by mask + OA or adjuvant +/- NMBA  Planned trial extubation    Final Airway Details  Final airway type: endotracheal airway      Successful airway: ETT  Cuffed: yes   Successful intubation technique: video laryngoscopy  Blade: Luis F  Blade size: #3  ETT size (mm): 7.0  Cormack-Lehane Classification: grade I - full view of glottis  Placement verified by: capnometry and palpation of cuff   Cuff volume (mL): 8  Measured from: teeth  ETT to teeth (cm): 21  Number of attempts at approach: 1    Additional Comments  Paramedic student

## 2025-04-10 NOTE — PERIOPERATIVE NURSING NOTE
1327- Arrived to [facility] 16 via cart with RN. Alert, emotional surgery was not completed as planned, Support given. Sister to bedside. VSS. Denies nausea, IV saline locked. Anterior neck incision with clean steri strips, ice in place. Pain tolerable. Call light within reach. Snack and drink given. Discussed phase II POC for total of 3 hour post op observation. Tentative discharge 3 pm.   1400- Tolerating PO. Pain tolerable, declines oral pain medication. Ice pack removed from incision per pt request. Up to bathroom with slow steady gait, voided unmeasured amount of urine  1430- VSS. Pain slowly regressing.   1500- VSS. Pain tolerable 3/10 in throat and surgical incision. IV discontinued. Reviewed discharge instructions, questions answered. Discharged home.

## 2025-04-10 NOTE — OP NOTE
OP NOTE     Date: 4/10/2025  OR Location: [facility]    Name: Latosha Larson : 1950 Age: 75 y.o. MRN: [id number]  Sex: female      Diagnosis  Pre-op Diagnosis  Left neck mass possible primary hyperparathyroidism Post-op Diagnosis     Same     Procedures    Left neck exploration  [id number] - TX EXPLORE DEEP NODE(S), NECK       Surgeons      Eric Rojas MD     Assistant:  Michelle Calzada CNP  The listed physician assistant / nurse practitioner  served as the first surgical assistant as there is no available qualified resident.  The assistance in this case was critical for the key portions including patient positioning and prep, retraction, and wound closure.     Procedure Summary  Anesthesia: General  Anesthesia Staff: David Oglesby DO   Estimated Blood Loss: Minimal      Specimen:   None      Findings: 2 x 3 send for mass sleeve adherent to the posterior medial aspect carotid artery.  Easily  from the vagus nerve and jugular vein and thyroid gland.  I could not establish a good tissue plane between the mass and the carotid and I am concerned this may be a pseudoaneurysm or neoplasm.  Mass was not resected and will be referred to head and neck oncology and/or vascular surgery    Indications: Latosha Larson is a 75 y.o. year old female patient with a left lower neck mass.  She has symptoms of hyperparathyroidism with conflicting lab values that could represent FHH versus primary hyperparathyroidism.  She has a 2 x 3 cm mass adjacent to the thyroid as well as the carotid artery and jugular vein.  An ultrasound-guided fine-needle aspiration of this mass suggested parathyroid tissue.  Exploration was recommended      The patient was seen in the preoperative area. The risks, benefits, complications, treatment options, non-operative alternatives, expected recovery and outcomes were discussed with the patient. The possibilities of reaction to medication, pulmonary aspiration, injury to surrounding structures,  bleeding, recurrent infection, the need for additional procedures, failure to diagnose a condition, and creating a complication requiring transfusion or operation were discussed with the patient. The patient concurred with the proposed plan, giving informed consent.  The site of surgery was properly noted/marked if necessary per policy. The patient has been actively warmed in preoperative area. Preoperative antibiotics are not indicated. Venous thrombosis prophylaxis have been ordered including bilateral sequential compression devices and unilateral sequential compression device    Procedure Details: The patient was brought to the operating room placed on the operating table in the supine position.  After blood pressure and cardiac monitors were applied the patient was placed under general anesthesia and orally intubated with a nerve monitoring endotracheal tube.  The neck was extended with a shoulder roll and head donut.  The anterior neck was prepped and draped in the usual sterile fashion.  A low collar incision was mapped out and infiltrated with 1% lidocaine with 1 100,000 epinephrine.  After allowing for vaso constriction incision was made down through skin and subcutaneous tissue.  The median raphae a between the strap muscles divided in the vertical midline.  The left strap muscles are elevated up off of the left thyroid.   There was a fibrous adhesion between the thyroid and great vessels.  The thyroid was carefully dissected away from the great vessels and there was no visible mass.  I then explored the great vessel area.  I incised the fascia.  There was a palpable mass on the posterior medial aspect of the carotid artery.  This  from the vagus and jugular vein easily.  There was not a good tissue plane between this mass and the carotid artery.  I was concerned about neoplasm or possible pseudoaneurysm and elected to not pursue further without having vascular surgery available.  I decided to  close the wound at this point and refer the patient.  The strap muscles and deep tissues were reapproximated with 3 0 Vicryl suture in the skin edges were approximated with a running 4 0 Monocryl suture.  Benzoin and Steri-Strip was applied.  The patient was allowed to awaken from anesthesia and was extubated in the operating room.  She was transported to the postanesthesia care unit in stable condition having tolerated the procedure well.  There were no complications.   Complications:  None; patient tolerated the procedure well.    Disposition: PACU - hemodynamically stable.  Condition: stable             Eric Rojas MD  Phone Number: [phone]

## 2025-04-10 NOTE — ANESTHESIA PREPROCEDURE EVALUATION
Patient: Latosha Larson    Procedure Information       Date/Time: 04/10/25 1055    Procedure: Parathyroidectomy    [facility]: [facility] / [facility]    Surgeons: Eric Rojas MD            Relevant Problems   Cardiac   (+) Embolism and thrombosis of arteries of the lower extremities   (+) Hypertensive disorder   (+) Mixed hyperlipidemia      Pulmonary   (+) Multiple lung nodules      Neuro   (+) Anxiety   (+) Depression, major, single episode, mild (CMS-HCC)      GI   (+) Irritable bowel syndrome with diarrhea      Endocrine   (+) Acquired hypothyroidism   (+) Class 1 obesity with body mass index (BMI) of 34.0 to 34.9 in adult   (+) Primary hyperparathyroidism (Multi)   (+) Type 2 diabetes mellitus      Hematology   (+) Deep venous thrombosis (Multi)   (+) Embolism and thrombosis of arteries of the lower extremities      Musculoskeletal   (+) Degenerative joint disease   (+) Dextroscoliosis      HEENT   (+) Chronic sinusitis       Clinical information reviewed:   Tobacco  Allergies  Meds   Med Hx  Surg Hx  OB Status  Fam Hx  Soc   Hx        NPO Detail:  NPO/Void Status  Carbohydrate Drink Given Prior to Surgery? : N  Date of Last Liquid: 04/09/25  Time of Last Liquid: 2230  Date of Last Solid: 04/09/25  Time of Last Solid: 1830  Last Intake Type: Clear fluids  Time of Last Void: 0900         Physical Exam    Airway  Mallampati: III  TM distance: >3 FB     Cardiovascular    Dental    Pulmonary    Abdominal        Anesthesia Plan    History of general anesthesia?: yes  History of complications of general anesthesia?: no    ASA 3     general     intravenous induction   Anesthetic plan and risks discussed with patient.

## 2025-04-16 ENCOUNTER — APPOINTMENT (OUTPATIENT)
Dept: OTOLARYNGOLOGY | Facility: CLINIC | Age: 75
End: 2025-04-16
Payer: MEDICARE

## 2025-04-16 VITALS — WEIGHT: 182 LBS | BODY MASS INDEX: 31.07 KG/M2 | HEIGHT: 64 IN | TEMPERATURE: 98.2 F

## 2025-04-16 DIAGNOSIS — E83.52 HYPERCALCEMIA: Primary | ICD-10-CM

## 2025-04-16 DIAGNOSIS — E21.2 OTHER HYPERPARATHYROIDISM (MULTI): ICD-10-CM

## 2025-04-16 DIAGNOSIS — R22.1 NECK MASS: ICD-10-CM

## 2025-04-16 PROCEDURE — 1157F ADVNC CARE PLAN IN RCRD: CPT | Performed by: OTOLARYNGOLOGY

## 2025-04-16 PROCEDURE — 1036F TOBACCO NON-USER: CPT | Performed by: OTOLARYNGOLOGY

## 2025-04-16 PROCEDURE — 1123F ACP DISCUSS/DSCN MKR DOCD: CPT | Performed by: OTOLARYNGOLOGY

## 2025-04-16 PROCEDURE — 3044F HG A1C LEVEL LT 7.0%: CPT | Performed by: OTOLARYNGOLOGY

## 2025-04-16 PROCEDURE — 99024 POSTOP FOLLOW-UP VISIT: CPT | Performed by: OTOLARYNGOLOGY

## 2025-04-16 PROCEDURE — 1159F MED LIST DOCD IN RCRD: CPT | Performed by: OTOLARYNGOLOGY

## 2025-04-16 PROCEDURE — 3008F BODY MASS INDEX DOCD: CPT | Performed by: OTOLARYNGOLOGY

## 2025-04-16 NOTE — PROGRESS NOTES
Chief Complaint   Patient presents with    Post-op     POST OP 4/10/2025 LT NECK PTH, COUGH      Date of Evaluation: 4/16/2025   HPI  She returns status post parathyroid neck exploration April 10, 2025.  At the time of exploration there was a mass densely adherent to the posterior aspect of  Dr. Keyes next week      Her nuclear medicine dotatate scan was negative for paraganglioma.  I reviewed these results with the patient and told her I believe we are dealing with a left parathyroid abnormality/adenoma.  I have recommended we proceed with neck exploration with excision of this mass that is likely a parathyroid adenoma.      2/3/25: I reviewed all of her imaging studies with Dr. Fonseca in radiology.  The left neck mass is likely a parathyroid adenoma and the smaller area of illumination on nuclear medicine study is probably due to the cystic component within the mass noted on CT scan.  However we cannot be certain that this does not represent a paraganglioma.  Her endocrinologist felt as though it is a paraganglioma and she does have some blood work suggesting this is an underlying problem.  The recommendation was to check a nuclear medicine Dotatate scan to see if this lights up as a paraganglioma.  That will help in the differential.  I explained this to the patient and she will get the study done and I will see her following Latosha Larson is a 75 y.o. female with hypercalcemia.  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  Bone density study shows osteopenia.  She complains of excessive fatigue.  She has some hoarseness.  She denies dysphagia odynophagia weight loss and otalgia.  Parathyroid hormone 198.  She has osteopenia on bone density study.  A thyroid ultrasound shows July 11, 2024:  Right lobe 2.8 cm, left lobe 2.9 cm.  Left 2.8 x 1.2 x 3.7 cm nodule T RADS 4 in the lateral thyroid    CT scan of neck August 2024 shows:  Assessment for  parathyroid adenoma is suboptimal on  this monophasic examination. There is a 2.4 x 1.1 x 1.4 cm  heterogeneous oval mass seen medial to the left common carotid artery  and lateral to the presumed border of the thyroid (axial series 4,  image 52, series 6, image 50) that also appears slightly  hyperenhancing relative to the thyroid, potentially representing a  parathyroid adenoma. The thyroid appears nonenlarged with additional  subcentimeter nodules.         Past Medical History:   Diagnosis Date    Allergic rhinitis 07/03/2023    Ankle fracture, left 07/03/2023    Arthritis 2015    knees    Bladder cancer (Multi)     Body mass index (BMI) 33.0-33.9, adult 06/21/2021    Body mass index (BMI) of 33.0 to 33.9 in adult    Bursitis of right knee 10/08/2023    Cardiomegaly     Cataract 2013    removed in June/July 2014    Chronic back pain     Class 2 obesity with body mass index (BMI) of 35.0 to 35.9 in adult 02/27/2024    35.31 kg/m²    Closed dislocation of ankle 10/08/2023    Cognitive decline Dec, 2020    Complement 4 deficiency (Multi)     denies    Compression of vein 07/03/2023    Depression     Diabetes mellitus (Multi)     Type II    DJD (degenerative joint disease)     DVT (deep venous thrombosis) (Multi)     2020  left leg    Dysphagia 07/03/2023    Fibromyalgia     Fibromyalgia, primary 1981    Went to numerous docs before getting diagnosed    GERD (gastroesophageal reflux disease)     Gestational diabetes 1978, 1982, 1986    History of blood transfusion     2020  after surgery    HL (hearing loss) 2015    HLD (hyperlipidemia)     HTN (hypertension)     denies    Hypercalcemia 10/08/2023    Hyperparathyroidism (Multi)     Hypothyroidism     IBS (irritable bowel syndrome)     Kidney stone 10/08/2023    Lactic acidosis 10/08/2023    Lower leg edema 10/08/2023    Lumbar spondylosis     Lung nodules     multiple    Macular degeneration     Malignant neoplasm of bladder     May-Thurner syndrome     OA  (osteoarthritis)     Osteopenia     Palpitations 2023    PE (pulmonary thromboembolism) (Multi)         Peptic ulcer 07/10/2008    Pregnant ([facility]) births 1978, 1983, 1986    Pulmonary embolism     Pulmonary nodule     Right upper quadrant pain 2021    Abdominal pain, RUQ    Sepsis (Multi) 10/08/2023    Type 2 diabetes mellitus 2005    had gestational diabetes    Urinary tract infection about 3 in 70s    Not since the 1980s    Vitamin D deficiency     Wears hearing aid in both ears       Past Surgical History:   Procedure Laterality Date    BALLOON ANGIOPLASTY, ARTERY      iliac artery stent LLE    BLADDER TUMOR EXCISION  2022    Transurethral resection of bladder tumor    CATARACT EXTRACTION W/  INTRAOCULAR LENS IMPLANT Bilateral     Dr Alfonso    CERVICAL BIOPSY  W/ LOOP ELECTRODE EXCISION      cone biopsy     SECTION, LOW TRANSVERSE       section x3    COLONOSCOPY      CT ABDOMEN PELVIS ANGIOGRAM W AND/OR WO IV CONTRAST  2020    CT ABDOMEN PELVIS ANGIOGRAM W AND/OR WO IV CONTRAST [facility]    CYSTOSCOPY      with TURBT  multiple    IR INTERVENTION FILTER PLACEMENT      IVC FILTER RETRIEVAL      ORIF ANKLE FRACTURE Left     OTHER SURGICAL HISTORY      PARATHYROID GLAND SURGERY Left 04/10/2025    TONSILLECTOMY      TUBAL LIGATION  88    VARICOSE VEIN SURGERY Left     varicose vein ligation          Medications:   Current Outpatient Medications   Medication Instructions    betamethasone dipropionate (Diprosone) 0.05 % lotion once daily.    blood sugar diagnostic strip 1 each, miscellaneous, 3 times daily    cholecalciferol (VITAMIN D-3) 125 mcg, Daily    cyanocobalamin (VITAMIN B-12) 100 mcg, Daily    diphenhydrAMINE (SOMINEX) 50 mg, Nightly PRN    Eliquis 5 mg, oral, 2 times daily    levothyroxine (SYNTHROID, LEVOXYL) 100 mcg, oral, Daily, Take on an empty stomach at the same time each day, either 30 to 60 minutes prior to breakfast    metFORMIN (GLUCOPHAGE)  "1,000 mg, oral, 2 times daily (morning and late afternoon)    omega 3-dha-epa-fish oil (Fish OiL) 1,200 (144-216) mg capsule 1 capsule, Daily    pantoprazole (PROTONIX) 40 mg, Daily before breakfast    phenazopyridine (PYRIDIUM) 100 mg, oral, 3 times daily PRN    rosuvastatin (CRESTOR) 20 mg, oral, Daily    traMADol (ULTRAM) 50 mg, oral, Every 8 hours PRN        Allergies:  Allergies   Allergen Reactions    Flexeril [Cyclobenzaprine] Hives, Rash and Other    Augmentin [Amoxicillin-Pot Clavulanate] Other and Nausea/vomiting    Bee Venom Protein (Honey Bee) Swelling    Pneumococcal 23-Katelyn Ps Vaccine Other and Hives    Venom-Wasp Swelling    Venom-Wasp Protein Swelling        Physical Exam:  Last Recorded Vitals  Temperature 36.8 °C (98.2 °F), height 1.626 m (5' 4\"), weight 82.6 kg (182 lb). , Body mass index is 31.24 kg/m².  []General appearance: Well-developed, well-nourished in no acute distress, conversant with normal voice quality    Head/face: No erythema or edema or facial tenderness, and normal facial nerve function bilaterally    External ear: Clear external auditory canals with normal pinnae  Tube status: N/A  Middle ear: Tympanic membranes intact and mobile, middle ears normal.  Tympanic membrane perforation: N/A  Mastoid bowl: N/A  Hearing: Normal conversational awareness at normal speech thresholds    Nose visualized using: Anterior rhinoscopy  Nasal dorsum: Nontraumatic midline appearance  Septum: Midline, nonobstructing  Inferior turbinates: Normal, pink  Secretions: Dry    Oral cavity and oropharynx: Normal  Teeth: Good condition  Floor of mouth: without lesions  Palate: Normal hard palate, soft palate and uvula  Oropharynx: Clear, no lesions present  Buccal mucosa: Normal without masses or lesions  Lips: Normal    Nasopharynx: Normal on endoscopy.  Larynx and hypopharynx: Flexible laryngoscopy was performed secondary to an inadequate mirror examination.  With verbal informed consent the flexible " laryngoscope was passed through the nasal cavity.  The patient had a normal epiglottis, AE folds, arytenoids, false vocal cords, true vocal cords, and normal vocal cord mobility bilaterally.  No significant mucosal masses or lesions noted    Neck: Incision healing well  Salivary glands: Normal bilateral parotid and submandibular glands by inspection and palpation.  Non-thyroid masses: No palpable masses or significant lymphadenopathy  Trachea: Midline  Thyroid: No thyromegaly or palpable nodules  Temporomandibular joint: Nontender  Cervical range of motion: Normal    Neurologic exam: Alert and oriented x3, appropriate affect.  Cranial nerves II-XII normal bilaterally  Extraocular movement: Extraocular movement intact, normal gaze alignment          Latosha was seen today for post-op.  Diagnoses and all orders for this visit:  Hypercalcemia (Primary)  Other hyperparathyroidism (Multi)  Neck mass         PLAN  I will await Dr. Keyes's evaluation        Eric Rojas MD

## 2025-04-23 ENCOUNTER — OFFICE VISIT (OUTPATIENT)
Dept: OTOLARYNGOLOGY | Facility: HOSPITAL | Age: 75
End: 2025-04-23
Payer: MEDICARE

## 2025-04-23 DIAGNOSIS — R22.1 NECK MASS: Primary | ICD-10-CM

## 2025-04-23 PROCEDURE — 1160F RVW MEDS BY RX/DR IN RCRD: CPT | Performed by: OTOLARYNGOLOGY

## 2025-04-23 PROCEDURE — 31575 DIAGNOSTIC LARYNGOSCOPY: CPT | Performed by: OTOLARYNGOLOGY

## 2025-04-23 PROCEDURE — 99204 OFFICE O/P NEW MOD 45 MIN: CPT | Performed by: OTOLARYNGOLOGY

## 2025-04-23 PROCEDURE — 99214 OFFICE O/P EST MOD 30 MIN: CPT | Mod: 25 | Performed by: OTOLARYNGOLOGY

## 2025-04-23 PROCEDURE — 1159F MED LIST DOCD IN RCRD: CPT | Performed by: OTOLARYNGOLOGY

## 2025-04-23 PROCEDURE — 3044F HG A1C LEVEL LT 7.0%: CPT | Performed by: OTOLARYNGOLOGY

## 2025-04-23 PROCEDURE — 1123F ACP DISCUSS/DSCN MKR DOCD: CPT | Performed by: OTOLARYNGOLOGY

## 2025-04-23 PROCEDURE — 1157F ADVNC CARE PLAN IN RCRD: CPT | Performed by: OTOLARYNGOLOGY

## 2025-04-23 ASSESSMENT — PATIENT HEALTH QUESTIONNAIRE - PHQ9
SUM OF ALL RESPONSES TO PHQ9 QUESTIONS 1 & 2: 0
2. FEELING DOWN, DEPRESSED OR HOPELESS: NOT AT ALL
1. LITTLE INTEREST OR PLEASURE IN DOING THINGS: NOT AT ALL

## 2025-04-23 NOTE — PROGRESS NOTES
CC:neck mass    Consulted by:dr fan    HPI:     History of fatigue osteopenia ankle fracture and rheumatologic condition as well as hypothyroidism with a left neck mass noted on ultrasound    Has been hypercalcemic with an elevated PTH    Has been seen by endocrinology and based on                Past medical history:    Past surgical history:    Social history: smoking, drinking, lives with, independent     Family history:  Reviewed and not relevant to the presenting complaint    Current medications:      Reviewed as noted in current orders     Allergies:                               Reviewed and as noted in current orders    ROS:  All other systems have been reviewed and are negative for complaint. I personally reviewed the intake form that was scanned in today    PE:  CONSTITUTIONAL:  Vitals  -reviewed from intake field, well developed, well nourished.    VOICE:    RESPIRATION:  Breathing comfortably, no stridor.  CV:  No clubbing/cyanosis/edema in hands.  EYES:  EOM Intact, sclera normal.  NEURO:  Alert and oriented times 3, Cranial nerves II-XII intact and symmetric bilaterally.  HEAD AND FACE:  Symmetric facial features,  no masses or lesions, sinuses nontender to palpation.  SALIVARY GLANDS:  Parotid and submandibular glands normal bilaterally.  EARS:  Normal external ears, external auditory canals, and TMs to otoscopy, normal hearing to whispered voice.  NOSE:  External nose midline, anterior rhinoscopy is normal with limited visualization to the anterior aspect of the inferior turbinates.  No lesions noted.    ORAL CAVITY/OROPHARYNX/LIPS:  Normal mucous membranes, normal floor of mouth/tongue/OP, no masses or lesions are noted.  PHARYNGEAL WALLS AND NASOPHARYNX:  No masses noted. Mucosa appears clean and moist  NECK/LYMPH:  No LAD, no thyroid masses. Trachea palpably midline  SKIN:  Neck skin is without scar or injury  PSYCH:  Alert and oriented with appropriate mood and affect  Radiology reviewed:   I  personally reviewed the     A/P:

## 2025-04-24 DIAGNOSIS — R22.1 NECK MASS: ICD-10-CM

## 2025-04-26 NOTE — PROGRESS NOTES
Subjective   Patient ID: Latosha Larson is a 75 y.o. female.    PROCEDURE NOTE:    PREOPERATIVE DIAGNOSIS:  recurrent LG NMIBC,   status post BCG  Last recurrence: 01/25/2025 underwent a TURBT     POSTOPERATIVE DIAGNOSIS:  Same    OPERATION:  Flexible Cystourethroscopy    SURGEON:  Stefany Phipps MD    ANESTHESIA:  2%  lidocaine jelly    COMPLICATIONS:  None    EBL: Minimal    SPECIMEN:  Voided urine was not collected and submitted for cytology.    DISPOSITION:  The patient was discharged home after the procedure, per routine.    INDICATIONS: :  Ms. Larson is a 75 y.o. patient with a history of recurrent LG NMIBC  who presents today for Cystoscopy.     The indications, risks and benefits of this procedure were discussed with the patient, consent was obtained prior to the procedure, and to the best of my judgement the patient seemed to understand and agree to the procedure.    PROCEDURE:  The patient  was brought into the procedure suite and informed consent was reviewed and confirmed. Vital signs were obtained prior to the procedure: LMP  (LMP Unknown) .  The patient was escorted onto the stretcher, placed supine, prepped with betadine and draped in the usual standard surgical fashion.  Intraurethral 2% viscous lidocaine jelly was used for local analgesia.  A 16 Chinese flexible cystourethroscope was inserted into the urethra.   The urethra was normal.  Upon entering the bladder the entire bladder was surveyed in a 360 degree fashion.  The left and right ureteral orifices were in normal orthotopic position effluxing clear yellow urine, bilaterally.   There was no evidence of any bladder lesions, foreign objects, stones or evidence of any mucosal changes. The cystoscope was then retroflexed.  The bladder neck was then further examined without any evidence of lesions. The scope was then removed and in an antegrade fashion, the urethra and bladder were again resurveyed with no evidence of additional lesions.  The  cystoscope was then fully removed.   The patient tolerated the procedure well.  Vitals were stable after the procedure.  The patient was able to void and was discharged home.  Verbal and written Post procedure instructions were reviewed with the patient.    IMPRESSION:  Negative cystoscopy    PLAN:  Repeat cystoscopy in 3 months      HPI  75 y.o. female presents for a cystoscopy today in office. During a cystoscopy on 12/4/2024, the patient had a 2.5 cm papillary tumor on the dome. The patient underwent TURBT on 01/21/2025. She is status post BCG in early 2024.      NM PET CT DOTATATE; 2/19/2025   IMPRESSION:  1. Minimal somatostatin receptor expression within soft tissue lesion  adjacent to left thyroid lobe, findings are not consistent with  paraganglioma.  2. No evidence of somatostatin receptor expressing disease elsewhere.    Review of Systems  A complete review of systems was performed. All systems are noted to be negative unless indicated in the history of present illness, impression, active problem list, or past histories.     Objective   Physical Exam    Assessment/Plan   Diagnoses and all orders for this visit:  Malignant neoplasm of anterior wall of urinary bladder (Multi)  -     Cystoscopy      75 y.o. female presents for a cystoscopy today in office. During a cystoscopy on 12/4/2024, the patient had a 2.5 cm papillary tumor on the dome. The patient underwent TURBT on 01/21/2025. Status post BCG treatments in early 2024.     The patient underwent a cystoscopy today in office. The patient tolerated the procedure well without incidence. The details are above. The results are a negative cystoscopy. She will return in 3 months for a repeat cystoscopy.       Plan:  Repeat cystoscopy in 3 months       Scribe Attestation   By signing my name below, IArsh, Scribe attestation that this documentation has been prepared under the direction and in the presence of Stefany Phipps MD.

## 2025-04-28 ENCOUNTER — APPOINTMENT (OUTPATIENT)
Dept: UROLOGY | Facility: CLINIC | Age: 75
End: 2025-04-28
Payer: MEDICARE

## 2025-04-28 DIAGNOSIS — C67.3 MALIGNANT NEOPLASM OF ANTERIOR WALL OF URINARY BLADDER (MULTI): ICD-10-CM

## 2025-04-28 PROCEDURE — 99213 OFFICE O/P EST LOW 20 MIN: CPT | Performed by: STUDENT IN AN ORGANIZED HEALTH CARE EDUCATION/TRAINING PROGRAM

## 2025-04-28 PROCEDURE — 52000 CYSTOURETHROSCOPY: CPT | Performed by: STUDENT IN AN ORGANIZED HEALTH CARE EDUCATION/TRAINING PROGRAM

## 2025-05-05 DIAGNOSIS — E06.3 HYPOTHYROIDISM DUE TO HASHIMOTO THYROIDITIS: Primary | ICD-10-CM

## 2025-05-05 DIAGNOSIS — E83.52 HYPERCALCEMIA: ICD-10-CM

## 2025-05-08 ENCOUNTER — HOSPITAL ENCOUNTER (OUTPATIENT)
Dept: RADIOLOGY | Facility: HOSPITAL | Age: 75
Discharge: HOME | End: 2025-05-08
Payer: MEDICARE

## 2025-05-08 ENCOUNTER — HOSPITAL ENCOUNTER (OUTPATIENT)
Dept: VASCULAR MEDICINE | Facility: HOSPITAL | Age: 75
Discharge: HOME | End: 2025-05-08
Payer: MEDICARE

## 2025-05-08 ENCOUNTER — APPOINTMENT (OUTPATIENT)
Dept: VASCULAR MEDICINE | Facility: HOSPITAL | Age: 75
End: 2025-05-08
Payer: MEDICARE

## 2025-05-08 ENCOUNTER — TELEPHONE (OUTPATIENT)
Dept: PRIMARY CARE | Facility: CLINIC | Age: 75
End: 2025-05-08
Payer: MEDICARE

## 2025-05-08 DIAGNOSIS — R09.89 OTHER SPECIFIED SYMPTOMS AND SIGNS INVOLVING THE CIRCULATORY AND RESPIRATORY SYSTEMS: ICD-10-CM

## 2025-05-08 DIAGNOSIS — R22.1 NECK MASS: ICD-10-CM

## 2025-05-08 PROCEDURE — 70498 CT ANGIOGRAPHY NECK: CPT

## 2025-05-08 PROCEDURE — 93880 EXTRACRANIAL BILAT STUDY: CPT

## 2025-05-08 PROCEDURE — 93880 EXTRACRANIAL BILAT STUDY: CPT | Performed by: SURGERY

## 2025-05-08 PROCEDURE — 2550000001 HC RX 255 CONTRASTS: Performed by: OTOLARYNGOLOGY

## 2025-05-08 RX ADMIN — IOHEXOL 75 ML: 350 INJECTION, SOLUTION INTRAVENOUS at 13:51

## 2025-05-12 DIAGNOSIS — R22.1 NECK MASS: ICD-10-CM

## 2025-05-13 ENCOUNTER — TELEPHONE (OUTPATIENT)
Dept: OTOLARYNGOLOGY | Facility: HOSPITAL | Age: 75
End: 2025-05-13
Payer: MEDICARE

## 2025-05-13 NOTE — TELEPHONE ENCOUNTER
Patient calling for radiology results done last week.          Spoke with patient, reviewed her carotid duplex as well as CT angiography    She is also met with endocrinology and repeat labs are being drawn in June for her hyperthyroidism      Will present her at tumor board next week for an opinion and for a radiology review given the intraoperative findings of densely adherent to the carotid artery but with the interpretation of a partially necrotic mass and interval growth raising concern for parathyroid carcinoma however PTH does not fit with this      She is scheduled to have the blood work redrawn      Will call her after the tumor board has reviewed the neck steps      We discussed the relationship and intraoperative findings of being densely adherent to the carotid artery based on the findings of an experienced thyroid surgeon    Surgical intervention may require having vascular surgery on standby for patching or replacement if surgical reexploration is attempted

## 2025-05-19 DIAGNOSIS — R22.1 NECK MASS: ICD-10-CM

## 2025-05-22 ENCOUNTER — APPOINTMENT (OUTPATIENT)
Dept: PRIMARY CARE | Facility: CLINIC | Age: 75
End: 2025-05-22
Payer: MEDICARE

## 2025-05-23 ENCOUNTER — TUMOR BOARD CONFERENCE (OUTPATIENT)
Dept: HEMATOLOGY/ONCOLOGY | Facility: HOSPITAL | Age: 75
End: 2025-05-23
Payer: MEDICARE

## 2025-05-30 ENCOUNTER — TELEPHONE (OUTPATIENT)
Dept: OTOLARYNGOLOGY | Facility: HOSPITAL | Age: 75
End: 2025-05-30
Payer: MEDICARE

## 2025-06-06 ENCOUNTER — HOSPITAL ENCOUNTER (EMERGENCY)
Facility: HOSPITAL | Age: 75
Discharge: HOME | End: 2025-06-07
Attending: EMERGENCY MEDICINE
Payer: MEDICARE

## 2025-06-06 ENCOUNTER — CLINICAL SUPPORT (OUTPATIENT)
Dept: EMERGENCY MEDICINE | Facility: HOSPITAL | Age: 75
End: 2025-06-06
Payer: MEDICARE

## 2025-06-06 ENCOUNTER — OFFICE VISIT (OUTPATIENT)
Dept: URGENT CARE | Age: 75
End: 2025-06-06
Payer: MEDICARE

## 2025-06-06 ENCOUNTER — APPOINTMENT (OUTPATIENT)
Dept: RADIOLOGY | Facility: HOSPITAL | Age: 75
End: 2025-06-06
Payer: MEDICARE

## 2025-06-06 VITALS
DIASTOLIC BLOOD PRESSURE: 80 MMHG | RESPIRATION RATE: 18 BRPM | TEMPERATURE: 98 F | OXYGEN SATURATION: 98 % | HEART RATE: 87 BPM | SYSTOLIC BLOOD PRESSURE: 140 MMHG

## 2025-06-06 DIAGNOSIS — R10.13 EPIGASTRIC DISCOMFORT: ICD-10-CM

## 2025-06-06 DIAGNOSIS — B34.9 VIRAL SYNDROME: Primary | ICD-10-CM

## 2025-06-06 DIAGNOSIS — R07.9 CHEST PAIN, UNSPECIFIED TYPE: Primary | ICD-10-CM

## 2025-06-06 LAB
ALBUMIN SERPL BCP-MCNC: 4.4 G/DL (ref 3.4–5)
ALP SERPL-CCNC: 63 U/L (ref 33–136)
ALT SERPL W P-5'-P-CCNC: 11 U/L (ref 7–45)
ANION GAP BLDV CALCULATED.4IONS-SCNC: 11 MMOL/L (ref 10–25)
ANION GAP SERPL CALC-SCNC: 16 MMOL/L (ref 10–20)
APTT PPP: 29 SECONDS (ref 26–36)
AST SERPL W P-5'-P-CCNC: 15 U/L (ref 9–39)
BASE EXCESS BLDV CALC-SCNC: -3.2 MMOL/L (ref -2–3)
BASOPHILS # BLD AUTO: 0.06 X10*3/UL (ref 0–0.1)
BASOPHILS NFR BLD AUTO: 0.7 %
BILIRUB SERPL-MCNC: 0.5 MG/DL (ref 0–1.2)
BODY TEMPERATURE: 37 DEGREES CELSIUS
BUN SERPL-MCNC: 28 MG/DL (ref 6–23)
CA-I BLDV-SCNC: 1.28 MMOL/L (ref 1.1–1.33)
CALCIUM SERPL-MCNC: 10.6 MG/DL (ref 8.6–10.6)
CARDIAC TROPONIN I PNL SERPL HS: <3 NG/L (ref 0–34)
CHLORIDE BLDV-SCNC: 103 MMOL/L (ref 98–107)
CHLORIDE SERPL-SCNC: 100 MMOL/L (ref 98–107)
CO2 SERPL-SCNC: 23 MMOL/L (ref 21–32)
CREAT SERPL-MCNC: 1.06 MG/DL (ref 0.5–1.05)
EGFRCR SERPLBLD CKD-EPI 2021: 55 ML/MIN/1.73M*2
EOSINOPHIL # BLD AUTO: 0.21 X10*3/UL (ref 0–0.4)
EOSINOPHIL NFR BLD AUTO: 2.4 %
ERYTHROCYTE [DISTWIDTH] IN BLOOD BY AUTOMATED COUNT: 13.7 % (ref 11.5–14.5)
GLUCOSE BLDV-MCNC: 93 MG/DL (ref 74–99)
GLUCOSE SERPL-MCNC: 125 MG/DL (ref 74–99)
HCO3 BLDV-SCNC: 22.7 MMOL/L (ref 22–26)
HCT VFR BLD AUTO: 35.9 % (ref 36–46)
HCT VFR BLD EST: 32 % (ref 36–46)
HGB BLD-MCNC: 11.6 G/DL (ref 12–16)
HGB BLDV-MCNC: 10.6 G/DL (ref 12–16)
IMM GRANULOCYTES # BLD AUTO: 0.04 X10*3/UL (ref 0–0.5)
IMM GRANULOCYTES NFR BLD AUTO: 0.5 % (ref 0–0.9)
INHALED O2 CONCENTRATION: 21 %
INR PPP: 1 (ref 0.9–1.1)
LACTATE BLDV-SCNC: 2.2 MMOL/L (ref 0.4–2)
LYMPHOCYTES # BLD AUTO: 3.06 X10*3/UL (ref 0.8–3)
LYMPHOCYTES NFR BLD AUTO: 35.1 %
MAGNESIUM SERPL-MCNC: 1.76 MG/DL (ref 1.6–2.4)
MCH RBC QN AUTO: 30.1 PG (ref 26–34)
MCHC RBC AUTO-ENTMCNC: 32.3 G/DL (ref 32–36)
MCV RBC AUTO: 93 FL (ref 80–100)
MONOCYTES # BLD AUTO: 0.51 X10*3/UL (ref 0.05–0.8)
MONOCYTES NFR BLD AUTO: 5.9 %
NEUTROPHILS # BLD AUTO: 4.83 X10*3/UL (ref 1.6–5.5)
NEUTROPHILS NFR BLD AUTO: 55.4 %
NRBC BLD-RTO: 0 /100 WBCS (ref 0–0)
OXYHGB MFR BLDV: 49.7 % (ref 45–75)
PCO2 BLDV: 43 MM HG (ref 41–51)
PH BLDV: 7.33 PH (ref 7.33–7.43)
PLATELET # BLD AUTO: 303 X10*3/UL (ref 150–450)
PO2 BLDV: 29 MM HG (ref 35–45)
POTASSIUM BLDV-SCNC: 4.5 MMOL/L (ref 3.5–5.3)
POTASSIUM SERPL-SCNC: 4.2 MMOL/L (ref 3.5–5.3)
PROT SERPL-MCNC: 7 G/DL (ref 6.4–8.2)
PROTHROMBIN TIME: 11 SECONDS (ref 9.8–12.4)
RBC # BLD AUTO: 3.85 X10*6/UL (ref 4–5.2)
SAO2 % BLDV: 50 % (ref 45–75)
SODIUM BLDV-SCNC: 132 MMOL/L (ref 136–145)
SODIUM SERPL-SCNC: 135 MMOL/L (ref 136–145)
TSH SERPL-ACNC: 0.75 MIU/L (ref 0.44–3.98)
WBC # BLD AUTO: 8.7 X10*3/UL (ref 4.4–11.3)

## 2025-06-06 PROCEDURE — 71045 X-RAY EXAM CHEST 1 VIEW: CPT

## 2025-06-06 PROCEDURE — 93010 ELECTROCARDIOGRAM REPORT: CPT | Performed by: EMERGENCY MEDICINE

## 2025-06-06 PROCEDURE — 87636 SARSCOV2 & INF A&B AMP PRB: CPT

## 2025-06-06 PROCEDURE — 93005 ELECTROCARDIOGRAM TRACING: CPT

## 2025-06-06 PROCEDURE — 84443 ASSAY THYROID STIM HORMONE: CPT

## 2025-06-06 PROCEDURE — 99285 EMERGENCY DEPT VISIT HI MDM: CPT | Performed by: EMERGENCY MEDICINE

## 2025-06-06 PROCEDURE — 82805 BLOOD GASES W/O2 SATURATION: CPT

## 2025-06-06 PROCEDURE — 71275 CT ANGIOGRAPHY CHEST: CPT

## 2025-06-06 PROCEDURE — 36415 COLL VENOUS BLD VENIPUNCTURE: CPT

## 2025-06-06 PROCEDURE — 84484 ASSAY OF TROPONIN QUANT: CPT | Performed by: EMERGENCY MEDICINE

## 2025-06-06 PROCEDURE — 36415 COLL VENOUS BLD VENIPUNCTURE: CPT | Performed by: EMERGENCY MEDICINE

## 2025-06-06 PROCEDURE — 2550000001 HC RX 255 CONTRASTS

## 2025-06-06 PROCEDURE — 2500000001 HC RX 250 WO HCPCS SELF ADMINISTERED DRUGS (ALT 637 FOR MEDICARE OP)

## 2025-06-06 PROCEDURE — 84132 ASSAY OF SERUM POTASSIUM: CPT | Mod: 59

## 2025-06-06 PROCEDURE — 83735 ASSAY OF MAGNESIUM: CPT

## 2025-06-06 PROCEDURE — 85610 PROTHROMBIN TIME: CPT | Performed by: EMERGENCY MEDICINE

## 2025-06-06 PROCEDURE — 85025 COMPLETE CBC W/AUTO DIFF WBC: CPT | Performed by: EMERGENCY MEDICINE

## 2025-06-06 PROCEDURE — 71045 X-RAY EXAM CHEST 1 VIEW: CPT | Performed by: RADIOLOGY

## 2025-06-06 PROCEDURE — 99285 EMERGENCY DEPT VISIT HI MDM: CPT | Mod: 25 | Performed by: EMERGENCY MEDICINE

## 2025-06-06 PROCEDURE — 71275 CT ANGIOGRAPHY CHEST: CPT | Performed by: RADIOLOGY

## 2025-06-06 PROCEDURE — 80053 COMPREHEN METABOLIC PANEL: CPT | Performed by: EMERGENCY MEDICINE

## 2025-06-06 RX ORDER — GUAIFENESIN 100 MG/5ML
200 LIQUID ORAL ONCE
Status: COMPLETED | OUTPATIENT
Start: 2025-06-06 | End: 2025-06-06

## 2025-06-06 RX ORDER — ACETAMINOPHEN 325 MG/1
975 TABLET ORAL ONCE
Status: COMPLETED | OUTPATIENT
Start: 2025-06-06 | End: 2025-06-06

## 2025-06-06 RX ADMIN — ACETAMINOPHEN 975 MG: 325 TABLET ORAL at 22:52

## 2025-06-06 RX ADMIN — GUAIFENESIN 200 MG: 200 SOLUTION ORAL at 22:52

## 2025-06-06 RX ADMIN — IOHEXOL 85 ML: 350 INJECTION, SOLUTION INTRAVENOUS at 23:33

## 2025-06-06 ASSESSMENT — PAIN SCALES - GENERAL
PAINLEVEL_OUTOF10: 5 - MODERATE PAIN
PAINLEVEL_OUTOF10: 0 - NO PAIN
PAINLEVEL_OUTOF10: 0 - NO PAIN

## 2025-06-06 ASSESSMENT — ENCOUNTER SYMPTOMS: SINUS COMPLAINT: 1

## 2025-06-06 ASSESSMENT — PAIN DESCRIPTION - DESCRIPTORS: DESCRIPTORS: PRESSURE

## 2025-06-06 ASSESSMENT — PAIN - FUNCTIONAL ASSESSMENT
PAIN_FUNCTIONAL_ASSESSMENT: 0-10

## 2025-06-06 ASSESSMENT — PAIN DESCRIPTION - LOCATION: LOCATION: CHEST

## 2025-06-06 NOTE — ED TRIAGE NOTES
Pt presents to ED with stating on Saturday night she got the worst heartburn she has ever had, with spinal pain, sinus drainage and a dry cough. Pt had chest pressure that felt like a stutter. Pt states she felt strange all week. States she took her old allergy pills, is still not feeling right and feels fatigued

## 2025-06-06 NOTE — PROGRESS NOTES
Subjective   Patient ID: Latosha Larson is a 75 y.o. female. They present today with a chief complaint of Sinus Problem (1 week).    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  Patient states starting on May 31 she began to develop what she describes as very severe heartburn.  States it was not getting better with her typical home remedies.  She is reporting onset of a dry nonproductive cough as well.  She does report some chest pressure and shortness of breath as well.  No abdominal pain or vomiting however does report associated nausea.  She has any generalized weakness however does report some fatigue.      Sinus Problem      Past Medical History  Allergies as of 06/06/2025 - Reviewed 06/06/2025   Allergen Reaction Noted    Flexeril [cyclobenzaprine] Hives, Rash, and Other 07/10/2008    Augmentin [amoxicillin-pot clavulanate] Other and Nausea/vomiting 07/03/2023    Bee venom protein (honey bee) Swelling 06/11/2024    Pneumococcal 23-florin ps vaccine Other and Hives 07/03/2023    Venom-wasp Swelling 01/21/2025    Venom-wasp protein Swelling 01/21/2025       Prescriptions Prior to Admission[1]       Medical History[2]    Surgical History[3]     reports that she quit smoking about 33 years ago. Her smoking use included cigarettes. She started smoking about 53 years ago. She has a 25 pack-year smoking history. She has never used smokeless tobacco. She reports current alcohol use. She reports that she does not use drugs.    Review of Systems  Review of Systems                               Objective    Vitals:    06/06/25 1528   BP: 140/80   Pulse: 87   Resp: 18   Temp: 36.7 °C (98 °F)   SpO2: 98%     No LMP recorded (lmp unknown). Patient is postmenopausal.    Physical Exam  Constitutional: Alert, oriented,  cooperative, in no acute distress. Appears well nourished and well hydrated.    Head: Normocephalic, atraumatic    Skin: Intact, dry skin. No lesions, rash, petechiae, or purpura.    Eyes: PERRL, EOMs intact, conjunctiva pink with no erythema or exudates. No scleral icterus. Eyelids without lesions.    ENT: Hearing grossly intact. No external deformities. Tympanic membranes intact with visible landmarks. Nares patent, mucus membranes moist. Dentition without lesions. Pharynx clear, uvula midline.    Neck: Trachea midline, no lymphadenopathy. No meningismus.     Pulmonary: Lungs clear to auscultation bilaterally with good chest wall excursion. No rales, rhonchi, or wheezing. No accessory muscle use or stridor.     Cardiac: Regular rate and rhythm. Normal S1 and S2 with no murmur, rub, or gallop. No JVD, carotids without bruits. 2+ DP and PT pulses.     Abdomen: Soft, nontender, normoactive bowel sounds. No palpable organomegaly or mass. No rebound or guarding. No CVA tenderness.     Genitourinary: Exam deferred.    Musculoskeletal: Full range of motion in extremities. No pain, edema, or deformities. Peripheral pulses full and equal. No cyanosis, or clubbing.     Neurological: Cranial nerves II through XII are grossly intact. Normal sensation, no weakness, no focal findings identified.     Psychiatric: Appropriate mood and affect. Calm.  Procedures    Point of Care Test & Imaging Results from this visit    Imaging  No results found.    Cardiology, Vascular, and Other Imaging  No other imaging results found for the past 2 days      Diagnostic study results (if any) were reviewed by Lwarence Mccloud PA-C.    Assessment/Plan   Allergies, medications, history, and pertinent labs/EKGs/Imaging reviewed by Lawrence Mccloud PA-C.     Medical Decision Making  Patient was seen eval in the clinic for multiple complaints including epigastric discomfort with associated nausea chest pressure and shortness of breath.  On  exam patient is nontoxic well-appearing is awake comfortably no acute distress.  Vital signs are stable, afebrile.  Chest is clear, heart is regular, belly is diffusely soft and nontender.  Given patient's age gender and race I do have a very high concern for presentation of atypical chest pain.  EKG was obtained which reveals a normal sinus rhythm with a ventricular rate of 90 bpm, normal axis, normal CT, QRS, QTc intervals, no acute ST segment elevations or depressions.  However, there are obvious T wave inversions in the precordial leads.  Given patient's age history physical exam and EKG changes I feel she warrants further evaluation emergent department to be assessed for underlying ACS.  I had a very sensitive conversation with the patient regarding my impression plan there is report to the ED and she expresses understanding and agreement this plan of care.  She refused EMS transport states she will drive herself to the ED.    Orders and Diagnoses  There are no diagnoses linked to this encounter.      Medical Admin Record      Follow Up Instructions  No follow-ups on file.    Patient disposition: Home    Electronically signed by Lawrence Mccloud PA-C  3:54 PM         [1] (Not in a hospital admission)  [2]   Past Medical History:  Diagnosis Date    Allergic rhinitis 07/03/2023    Ankle fracture, left 07/03/2023    Arthritis 2015    knees    Bladder cancer (Multi)     Body mass index (BMI) 33.0-33.9, adult 06/21/2021    Body mass index (BMI) of 33.0 to 33.9 in adult    Bursitis of right knee 10/08/2023    Cardiomegaly     Cataract 2013    removed in June/July 2014    Chronic back pain     Class 2 obesity with body mass index (BMI) of 35.0 to 35.9 in adult 02/27/2024    35.31 kg/m²    Closed dislocation of ankle 10/08/2023    Cognitive decline Dec, 2020    Complement 4 deficiency (Multi)     denies    Compression of vein 07/03/2023    Depression     Diabetes mellitus (Multi)     Type II    DJD (degenerative  joint disease)     DVT (deep venous thrombosis) (Multi)       left leg    Dysphagia 2023    Fibromyalgia     Fibromyalgia, primary 1981    Went to numerous docs before getting diagnosed    GERD (gastroesophageal reflux disease)     Gestational diabetes , ,     History of blood transfusion     2020  after surgery    HL (hearing loss)     HLD (hyperlipidemia)     HTN (hypertension)     denies    Hypercalcemia 10/08/2023    Hyperparathyroidism (Multi)     Hypothyroidism     IBS (irritable bowel syndrome)     Kidney stone 10/08/2023    Lactic acidosis 10/08/2023    Lower leg edema 10/08/2023    Lumbar spondylosis     Lung nodules     multiple    Macular degeneration     Malignant neoplasm of bladder     May-Thurner syndrome     OA (osteoarthritis)     Osteopenia     Palpitations 2023    PE (pulmonary thromboembolism) (Multi)         Peptic ulcer 07/10/2008    Pregnant ([facility]) births , ,     Pulmonary embolism     Pulmonary nodule     Right upper quadrant pain 2021    Abdominal pain, RUQ    Sepsis (Multi) 10/08/2023    Type 2 diabetes mellitus 2005    had gestational diabetes    Urinary tract infection about 3 in 70s    Not since the 1980s    Vitamin D deficiency     Wears hearing aid in both ears    [3]   Past Surgical History:  Procedure Laterality Date    BALLOON ANGIOPLASTY, ARTERY      iliac artery stent LLE    BLADDER TUMOR EXCISION  2022    Transurethral resection of bladder tumor    CATARACT EXTRACTION W/  INTRAOCULAR LENS IMPLANT Bilateral     Dr Alfonso    CERVICAL BIOPSY  W/ LOOP ELECTRODE EXCISION      cone biopsy     SECTION, LOW TRANSVERSE       section x3    COLONOSCOPY      CT ABDOMEN PELVIS ANGIOGRAM W AND/OR WO IV CONTRAST  2020    CT ABDOMEN PELVIS ANGIOGRAM W AND/OR WO IV CONTRAST [facility]    CYSTOSCOPY      with TURBT  multiple    IR INTERVENTION FILTER PLACEMENT      IVC FILTER RETRIEVAL      ORIF ANKLE  FRACTURE Left     OTHER SURGICAL HISTORY      PARATHYROID GLAND SURGERY Left 04/10/2025    TONSILLECTOMY      TUBAL LIGATION  4/28/87    VARICOSE VEIN SURGERY Left     varicose vein ligation

## 2025-06-07 VITALS
HEART RATE: 72 BPM | RESPIRATION RATE: 16 BRPM | DIASTOLIC BLOOD PRESSURE: 87 MMHG | OXYGEN SATURATION: 99 % | HEIGHT: 64 IN | BODY MASS INDEX: 30.9 KG/M2 | WEIGHT: 181 LBS | SYSTOLIC BLOOD PRESSURE: 142 MMHG | TEMPERATURE: 97.9 F

## 2025-06-07 LAB
APPEARANCE UR: CLEAR
ATRIAL RATE: 93 BPM
BILIRUB UR STRIP.AUTO-MCNC: NEGATIVE MG/DL
CARDIAC TROPONIN I PNL SERPL HS: <3 NG/L (ref 0–34)
COLOR UR: COLORLESS
FLUAV RNA RESP QL NAA+PROBE: NOT DETECTED
FLUBV RNA RESP QL NAA+PROBE: NOT DETECTED
GLUCOSE UR STRIP.AUTO-MCNC: NORMAL MG/DL
HOLD SPECIMEN: NORMAL
KETONES UR STRIP.AUTO-MCNC: NEGATIVE MG/DL
LEUKOCYTE ESTERASE UR QL STRIP.AUTO: NEGATIVE
NITRITE UR QL STRIP.AUTO: NEGATIVE
P AXIS: 46 DEGREES
P OFFSET: 211 MS
P ONSET: 153 MS
PH UR STRIP.AUTO: 5 [PH]
PR INTERVAL: 152 MS
PROT UR STRIP.AUTO-MCNC: NEGATIVE MG/DL
Q ONSET: 229 MS
QRS COUNT: 15 BEATS
QRS DURATION: 62 MS
QT INTERVAL: 328 MS
QTC CALCULATION(BAZETT): 407 MS
QTC FREDERICIA: 379 MS
R AXIS: 2 DEGREES
RBC # UR STRIP.AUTO: NEGATIVE MG/DL
SARS-COV-2 RNA RESP QL NAA+PROBE: NOT DETECTED
SP GR UR STRIP.AUTO: 1.04
T AXIS: 42 DEGREES
T OFFSET: 393 MS
UROBILINOGEN UR STRIP.AUTO-MCNC: NORMAL MG/DL
VENTRICULAR RATE: 93 BPM

## 2025-06-07 PROCEDURE — 81003 URINALYSIS AUTO W/O SCOPE: CPT

## 2025-06-07 NOTE — DISCHARGE INSTRUCTIONS
You presented to the ED for sinus congestion as well as generalized fatigue.  Your symptoms noted to likely be secondary to a viral syndrome.  Your lab work as well as imaging was unremarkable in the ED.  Follow-up with primary care within the next 2 to 3 days.  Please return to the emergency department for any new or worsening symptoms including but not limited to concerning pain, difficulty breathing, and fatigue.  The phone number for the primary care clinic at [facility] is [phone].     What Type Of Note Output Would You Prefer (Optional)?: Standard Output Hpi Title: Evaluation of Skin Lesions How Severe Are Your Spot(S)?: mild Have Your Spot(S) Been Treated In The Past?: has not been treated

## 2025-06-07 NOTE — ED PROVIDER NOTES
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  Patient has noted feeling unwell since this past Saturday.  She noted that her symptoms initially began with a heartburn sensation that radiated up into her chest and into her back.  Noted that her symptoms resolved after drinking water.  Noted that over the next several days, she has had generalized fatigue and sinus congestion.  She was noted to take \"a home allergy medication \"with mild improvement of her symptoms.  She was noted to be seen in urgent care today and sent to the ED for evaluation of ACS.  Notes that her cough has been dry nonproductive.  She was noted to have chest pressure as well as dyspnea over at urgent care.  Patient is denying the symptoms at this time.  Denied having any dyspnea since her symptoms began this past Saturday.  Noted chest pressure when she was having her heartburn sensation on Saturday that resolved Sunday morning.  Notes that she has been intermittently compliant with her Eliquis only taking the morning and not at night.  Notes that she is supposed to be on 2.5 mg of Eliquis twice daily.  Notes compliance with remainder of her medications including her levothyroxine.  Denied trauma, falls, fevers, chills, chest pain, difficulty breathing, paroxysmal nocturnal dyspnea, worsening lower extremity edema, orthopnea, exertional dyspnea, abdominal pain, and dysuria.    Limitations to history: None  Independent historian(s): Patient  Records Reviewed: Recent available ED and inpatient notes reviewed in EMR.    PMHx/PSHx:  Per HPI.   - has a past medical history of Allergic rhinitis (07/03/2023), Ankle fracture, left (07/03/2023), Arthritis (2015), Bladder cancer " (Multi), Body mass index (BMI) 33.0-33.9, adult (2021), Bursitis of right knee (10/08/2023), Cardiomegaly, Cataract (), Chronic back pain, Class 2 obesity with body mass index (BMI) of 35.0 to 35.9 in adult (2024), Closed dislocation of ankle (10/08/2023), Cognitive decline (Dec, 2020), Complement 4 deficiency (Multi), Compression of vein (2023), Depression, Diabetes mellitus (Multi), DJD (degenerative joint disease), DVT (deep venous thrombosis) (Multi), Dysphagia (2023), Fibromyalgia, Fibromyalgia, primary (), GERD (gastroesophageal reflux disease), Gestational diabetes (, , ), History of blood transfusion, HL (hearing loss) (), HLD (hyperlipidemia), HTN (hypertension), Hypercalcemia (10/08/2023), Hyperparathyroidism (Multi), Hypothyroidism, IBS (irritable bowel syndrome), Kidney stone (10/08/2023), Lactic acidosis (10/08/2023), Lower leg edema (10/08/2023), Lumbar spondylosis, Lung nodules, Macular degeneration, Malignant neoplasm of bladder, May-Thurner syndrome, OA (osteoarthritis), Osteopenia, Palpitations (2023), PE (pulmonary thromboembolism) (Multi), Peptic ulcer (07/10/2008), Pregnant ([facility]) (births , , ), Pulmonary embolism, Pulmonary nodule, Right upper quadrant pain (2021), Sepsis (Multi) (10/08/2023), Type 2 diabetes mellitus (), Urinary tract infection (about 3 in 70s), Vitamin D deficiency, and Wears hearing aid in both ears.  - has a past surgical history that includes  section, low transverse; Bladder tumor excision (2022); CT angio abdomen pelvis w and or wo IV IV contrast (2020); Cystoscopy; ORIF ankle fracture (Left); IR intervention filter placement; IVC filter retrieval; Cervical biopsy w/ loop electrode excision; Varicose vein surgery (Left); Balloon angioplasty, artery; Cataract extraction w/  intraocular lens implant (Bilateral); Colonoscopy; Tonsillectomy; Other surgical history; Tubal  ligation (4/28/87); and Parathyroid gland surgery (Left, 04/10/2025).    Medications:  Reviewed in EMR. See EMR for complete list of medications and doses.    Allergies:  Flexeril [cyclobenzaprine], Augmentin [amoxicillin-pot clavulanate], Bee venom protein (honey bee), Pneumococcal 23-florin ps vaccine, Venom-wasp, and Venom-wasp protein    Social History:  - Tobacco:  reports that she quit smoking about 33 years ago. Her smoking use included cigarettes. She started smoking about 53 years ago. She has a 25 pack-year smoking history. She has never used smokeless tobacco.   - Alcohol:  reports current alcohol use.   - Illicit Drugs:  reports no history of drug use.     ROS:  Per HPI.       ???????????????????????????????????????????????????????????????  Triage Vitals:  T 36.6 °C (97.9 °F)  HR 98  BP (!) 166/101  RR 17  O2 99 %      Physical Exam  Vitals and nursing note reviewed.   Constitutional:       General: She is not in acute distress.  HENT:      Head: Normocephalic and atraumatic.      Nose: Congestion present.      Mouth/Throat:      Mouth: Mucous membranes are moist.   Eyes:      Conjunctiva/sclera: Conjunctivae normal.   Neck:      Comments: No JVD.  Cardiovascular:      Rate and Rhythm: Normal rate and regular rhythm.      Pulses: Normal pulses.   Pulmonary:      Effort: Pulmonary effort is normal. No respiratory distress.      Breath sounds: Normal breath sounds.   Abdominal:      Palpations: Abdomen is soft.      Tenderness: There is no abdominal tenderness.   Skin:     General: Skin is warm.   Neurological:      General: No focal deficit present.      Mental Status: She is alert.         ???????????????????????????????????????????????????????????????  Labs:   Labs Reviewed   CBC WITH AUTO DIFFERENTIAL - Abnormal       Result Value    WBC 8.7      nRBC 0.0      RBC 3.85 (*)     Hemoglobin 11.6 (*)     Hematocrit 35.9 (*)     MCV 93      MCH 30.1      MCHC 32.3      RDW 13.7      Platelets 303       Neutrophils % 55.4      Immature Granulocytes %, Automated 0.5      Lymphocytes % 35.1      Monocytes % 5.9      Eosinophils % 2.4      Basophils % 0.7      Neutrophils Absolute 4.83      Immature Granulocytes Absolute, Automated 0.04      Lymphocytes Absolute 3.06 (*)     Monocytes Absolute 0.51      Eosinophils Absolute 0.21      Basophils Absolute 0.06     COMPREHENSIVE METABOLIC PANEL   TROPONIN SERIES- (INITIAL, 1 HR)    Narrative:     The following orders were created for panel order Troponin Series, (0, 1 HR).  Procedure                               Abnormality         Status                     ---------                               -----------         ------                     Troponin I, High Sensiti...[[id number]]                      In process                 Troponin, High Sensitivi...[[id number]]                                                   Please view results for these tests on the individual orders.   SERIAL TROPONIN-INITIAL   SERIAL TROPONIN, 1 HOUR   COAGULATION SCREEN   MAGNESIUM   SARS-COV-2 AND INFLUENZA A/B PCR   TSH WITH REFLEX TO FREE T4 IF ABNORMAL   URINALYSIS WITH REFLEX CULTURE AND MICROSCOPIC    Narrative:     The following orders were created for panel order Urinalysis with Reflex Culture and Microscopic.  Procedure                               Abnormality         Status                     ---------                               -----------         ------                     Urinalysis with Reflex C...[[id number]]                                                 Extra Urine Gray Tube[[id number]]                                                         Please view results for these tests on the individual orders.   BLOOD GAS VENOUS FULL PANEL   URINALYSIS WITH REFLEX CULTURE AND MICROSCOPIC   EXTRA URINE GRAY TUBE        Imaging:   XR chest 1 view   Final Result   No radiographic evidence of acute cardiopulmonary pathology.        MACRO:   None.        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  Patient presented hypertensive 166/101 with remainder of vitals WNL.  Physical exam finding significant for congestion.  Low clinical concern for acute aortic process including aortic dissection and pneumothorax.  Given that patient is intermittently compliant with Eliquis, CT PE ordered.  Cardiac workup ordered.  Given history of hypothyroidism, TSH ordered.  Venous full panel ordered to evaluate for glucose abnormality as well as calcium level.  Patient does not have a history of hypercalcemia.  Viral testing ordered.  CXR without any acute cardiopulmonary process.  EKG without ischemic findings.  UA ordered to evaluate for UTI.  Please see ED course and disposition for remainder of care.    ED Course:  ED Course as of 06/08/25 1610   Fri Jun 06, 2025   2240 EKG: Rate is 93, sinus rhythm, normal axis, no interval prolongation, no st elevation or depression.  When compared to EKG on 1/14/2020 review of EKG does not show any signs of STEMI, complete heart block, asystole, V-fib. [MH]   2302 CBC without leukocytosis and mild anemia with a hemoglobin 11.6.  Initial troponin noted to be <3.  TSH and magnesium are normal.  Metabolic panel sent for renal function mildly worse compared to baseline. [MH]   2355 Blood Gas Venous Full Panel(!)  Venous full panel with normal pH and mildly elevated lactate of 2.2. [MH]   2355 CT angio chest for pulmonary embolism  IMPRESSION:  No acute pulmonary embolus to the segmental level.      Enlarged main pulmonary artery measures up to 3.7 cm. Findings may  be  seen with pulmonary arterial hypertension.      Opacities along the dependent aspect of the lower lobes bilaterally  as well as the lingula favored to represent atelectasis.   []   Sat Jun 07, 2025   0059 COVID and flu testing negative.  Delta troponin normal. []   0210 Urinalysis with Reflex Culture and Microscopic(!)  UA without findings concerning for UTI. [MH]      ED Course User Index  [MH] Arvind Valencia MD         Diagnoses as of 06/08/25 1610   Viral syndrome       Social Determinants Limiting Care:  None identified    Disposition:  Patient tolerated p.o. and ambulated with a steady gait.  Upon reevaluation, patient notes feeling significantly better.  Her symptoms are likely secondary to viral syndrome as well as GERD.  Discussed ED findings, taking Protonix as well as Eliquis as prescribed, plan for outpatient primary care follow-up within the next week, and strict return precautions for any new or worsening symptoms including but not limited to concerning pain, difficulty breathing, and changes in mental status.  Patient stated understanding and agreement with the plan.  All questions were answered.  Patient discharged in stable condition.    Arvind Valencia MD   Emergency Medicine PGY-3  [facility]    Comment: Please note this report has been produced using speech recognition software and may contain errors related to that system including errors in grammar, punctuation, and spelling as well as words and phrases that may be inappropriate.  If there are any questions or concerns please feel free to contact the dictating provider for clarification.    Procedures ? Smart[employer] last updated 6/6/2025 10:31 PM        Arvind Valencia MD  Resident  06/08/25 [id number]

## 2025-06-09 DIAGNOSIS — R22.1 NECK MASS: ICD-10-CM

## 2025-06-09 LAB — HOLD SPECIMEN: NORMAL

## 2025-06-13 ENCOUNTER — TUMOR BOARD CONFERENCE (OUTPATIENT)
Dept: HEMATOLOGY/ONCOLOGY | Facility: HOSPITAL | Age: 75
End: 2025-06-13
Payer: MEDICARE

## 2025-06-13 DIAGNOSIS — R22.1 NECK MASS: Primary | ICD-10-CM

## 2025-06-13 LAB
25(OH)D3+25(OH)D2 SERPL-MCNC: 40 NG/ML (ref 30–100)
ALBUMIN SERPL-MCNC: 4.5 G/DL (ref 3.6–5.1)
BUN SERPL-MCNC: 22 MG/DL (ref 7–25)
BUN/CREAT SERPL: 21 (CALC) (ref 6–22)
CALCIUM SERPL-MCNC: 11.2 MG/DL (ref 8.6–10.4)
CHLORIDE SERPL-SCNC: 103 MMOL/L (ref 98–110)
CO2 SERPL-SCNC: 24 MMOL/L (ref 20–32)
CREAT SERPL-MCNC: 1.03 MG/DL (ref 0.6–1)
EGFRCR SERPLBLD CKD-EPI 2021: 57 ML/MIN/1.73M2
GLUCOSE SERPL-MCNC: 138 MG/DL (ref 65–99)
PHOSPHATE SERPL-MCNC: 2.8 MG/DL (ref 2.1–4.3)
POTASSIUM SERPL-SCNC: 5.1 MMOL/L (ref 3.5–5.3)
PTH-INTACT SERPL-MCNC: 153 PG/ML (ref 16–77)
SODIUM SERPL-SCNC: 136 MMOL/L (ref 135–146)
T4 FREE SERPL-MCNC: 1.7 NG/DL (ref 0.8–1.8)
TSH SERPL-ACNC: 1.08 MIU/L (ref 0.4–4.5)

## 2025-06-16 NOTE — TUMOR BOARD NOTE
Patient's presentation at Multidisciplinary Head & Neck Tumor Board will be deferred until next week.     Order replaced for Tumor Board Request.

## 2025-06-17 ENCOUNTER — TELEPHONE (OUTPATIENT)
Dept: OTOLARYNGOLOGY | Facility: CLINIC | Age: 75
End: 2025-06-17
Payer: MEDICARE

## 2025-06-17 NOTE — TELEPHONE ENCOUNTER
Left patient message regarding finalizing a plan discussing the review of her biopsy which is consistent with parathyroid tissue and the recommendation for reexploration with vascular on standby

## 2025-06-18 ENCOUNTER — TELEPHONE (OUTPATIENT)
Dept: OTOLARYNGOLOGY | Facility: CLINIC | Age: 75
End: 2025-06-18
Payer: MEDICARE

## 2025-06-18 NOTE — TELEPHONE ENCOUNTER
spoke with the patient    Pathology was reviewed and consistent with parathyroid tissue both on histology as well as positive for PTH      Does not appear to be pseudoaneurysm of the carotid based on vascular studies      Discussed with patient reexploration with preparation for thyroidectomy as well as management of the carotid as indicated based on intraoperative findings with either patch or segmental resection at the discretion of vascular surgery      She understands the risks which were reviewed in detail      Annette can you please arrange

## 2025-06-19 ENCOUNTER — APPOINTMENT (OUTPATIENT)
Dept: CARDIOLOGY | Facility: CLINIC | Age: 75
End: 2025-06-19
Payer: MEDICARE

## 2025-06-19 VITALS
WEIGHT: 182 LBS | OXYGEN SATURATION: 98 % | DIASTOLIC BLOOD PRESSURE: 82 MMHG | BODY MASS INDEX: 31.24 KG/M2 | SYSTOLIC BLOOD PRESSURE: 149 MMHG | HEART RATE: 85 BPM

## 2025-06-19 DIAGNOSIS — Z79.01 CHRONIC ANTICOAGULATION: ICD-10-CM

## 2025-06-19 DIAGNOSIS — I87.1 MAY-THURNER SYNDROME: Primary | ICD-10-CM

## 2025-06-19 PROCEDURE — 1160F RVW MEDS BY RX/DR IN RCRD: CPT | Performed by: INTERNAL MEDICINE

## 2025-06-19 PROCEDURE — 1159F MED LIST DOCD IN RCRD: CPT | Performed by: INTERNAL MEDICINE

## 2025-06-19 PROCEDURE — G2211 COMPLEX E/M VISIT ADD ON: HCPCS | Performed by: INTERNAL MEDICINE

## 2025-06-19 PROCEDURE — 1036F TOBACCO NON-USER: CPT | Performed by: INTERNAL MEDICINE

## 2025-06-19 PROCEDURE — 99213 OFFICE O/P EST LOW 20 MIN: CPT | Performed by: INTERNAL MEDICINE

## 2025-06-19 PROCEDURE — 3077F SYST BP >= 140 MM HG: CPT | Performed by: INTERNAL MEDICINE

## 2025-06-19 PROCEDURE — 3044F HG A1C LEVEL LT 7.0%: CPT | Performed by: INTERNAL MEDICINE

## 2025-06-19 PROCEDURE — 3079F DIAST BP 80-89 MM HG: CPT | Performed by: INTERNAL MEDICINE

## 2025-06-19 PROCEDURE — 99202 OFFICE O/P NEW SF 15 MIN: CPT

## 2025-06-19 NOTE — PROGRESS NOTES
Vascular Medicine established visit    History of Present Illness:  Latosha Larson is a/an 75 y.o. woman with history of left leg DVT; May-Thurner syndrome with stent done at [facility] complicated by retroperitoneal bleed. Had IVC filter, which was ultimately retrieved.     Notices bilateral ankle swelling, worse toward the end of the day, better first thing in the morning.     She continues on anticoagulation with apixban 2.5 mg bid (long-term secondary prevention dose).      She denies bleeding including epistaxis, gingival bleeding, hemoptysis, hematemesis, hematochezia, melena, hematuria, and vaginal bleeding.     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 Pathology has been benign but needs to see vascular surgery. Originally they thought maybe it was a paraganglioma.      Medical History[1]  Surgical History[2]  Social History[3]  Family History[4]  Current Medications[5]    Physical Examination:  Blood pressure 149/82, pulse 85, weight 82.6 kg (182 lb), SpO2 98%.  No distress  No JVD or carotid bruits  Lungs clear bilaterally  Heart regular and without murmurs  Abdomen soft and non-tender  No leg swelling  Pulses intact      Pertinent Labs:  Component      Latest Ref Rng 6/6/2025 6/12/2025   WBC      4.4 - 11.3 x10*3/uL 8.7     nRBC      0.0 - 0.0 /100 WBCs 0.0     RBC      4.00 - 5.20 x10*6/uL 3.85 (L)     HEMOGLOBIN      12.0 - 16.0 g/dL 11.6 (L)     HEMATOCRIT      36.0 - 46.0 % 35.9 (L)     MCV      80 - 100 fL 93     MCH      26.0 - 34.0 pg 30.1     MCHC      32.0 - 36.0 g/dL 32.3     RED CELL DISTRIBUTION WIDTH      11.5 - 14.5 % 13.7     Platelets      150 - 450 x10*3/uL 303     Neutrophils %      40.0 - 80.0 % 55.4     Immature Granulocytes %, Automated      0.0 - 0.9 % 0.5     Lymphocytes %      13.0 - 44.0 % 35.1     Monocytes %      2.0 - 10.0 % 5.9     Eosinophils %      0.0 - 6.0 % 2.4     Basophils %      0.0 - 2.0 % 0.7     Neutrophils Absolute      1.60 - 5.50 x10*3/uL  4.83     Immature Granulocytes Absolute, Automated      0.00 - 0.50 x10*3/uL 0.04     Lymphocytes Absolute      0.80 - 3.00 x10*3/uL 3.06 (H)     Monocytes Absolute      0.05 - 0.80 x10*3/uL 0.51     Eosinophils Absolute      0.00 - 0.40 x10*3/uL 0.21     Basophils Absolute      0.00 - 0.10 x10*3/uL 0.06     GLUCOSE      65 - 99 mg/dL  138 (H)    UREA NITROGEN (BUN)      7 - 25 mg/dL  22    CREATININE      0.60 - 1.00 mg/dL  1.03 (H)    EGFR      > OR = 60 mL/min/1.73m2  57 (L)    BUN/CREATININE RATIO      6 - 22 (calc)  21    SODIUM      135 - 146 mmol/L  136    POTASSIUM      3.5 - 5.3 mmol/L  5.1    CHLORIDE      98 - 110 mmol/L  103    CARBON DIOXIDE      20 - 32 mmol/L  24    CALCIUM      8.6 - 10.4 mg/dL  11.2 (H)    PHOSPHATE (AS PHOSPHORUS)      2.1 - 4.3 mg/dL  2.8    ALBUMIN      3.6 - 5.1 g/dL  4.5       Legend:  (L) Low  (H) High    Pertinent Imaging:    Diagnoses and all orders for this visit:  May-Thurner syndrome (Primary)  Chronic anticoagulation  Continue indefinite anticoagulation     Follow up in one year.    Anupama Frost MD, MS           [1]   Past Medical History:  Diagnosis Date    Allergic rhinitis 07/03/2023    Ankle fracture, left 07/03/2023    Arthritis 2015    knees    Bladder cancer (Multi)     Body mass index (BMI) 33.0-33.9, adult 06/21/2021    Body mass index (BMI) of 33.0 to 33.9 in adult    Bursitis of right knee 10/08/2023    Cardiomegaly     Cataract 2013    removed in June/July 2014    Chronic back pain     Class 2 obesity with body mass index (BMI) of 35.0 to 35.9 in adult 02/27/2024    35.31 kg/m²    Closed dislocation of ankle 10/08/2023    Cognitive decline Dec, 2020    Complement 4 deficiency (Multi)     denies    Compression of vein 07/03/2023    Depression     Diabetes mellitus (Multi)     Type II    DJD (degenerative joint disease)     DVT (deep venous thrombosis) (Multi)     2020  left leg    Dysphagia 07/03/2023    Fibromyalgia     Fibromyalgia, primary 1981    Went  to numerous docs before getting diagnosed    GERD (gastroesophageal reflux disease)     Gestational diabetes , 1982,     History of blood transfusion       after surgery    HL (hearing loss)     HLD (hyperlipidemia)     HTN (hypertension)     denies    Hypercalcemia 10/08/2023    Hyperparathyroidism (Multi)     Hypothyroidism     IBS (irritable bowel syndrome)     Kidney stone 10/08/2023    Lactic acidosis 10/08/2023    Lower leg edema 10/08/2023    Lumbar spondylosis     Lung nodules     multiple    Macular degeneration     Malignant neoplasm of bladder     May-Thurner syndrome     OA (osteoarthritis)     Osteopenia     Palpitations 2023    PE (pulmonary thromboembolism) (Multi)         Peptic ulcer 07/10/2008    Pregnant ([facility]) births , ,     Pulmonary embolism     Pulmonary nodule     Right upper quadrant pain 2021    Abdominal pain, RUQ    Sepsis (Multi) 10/08/2023    Type 2 diabetes mellitus     had gestational diabetes    Urinary tract infection about 3 in 70s    Not since the 1980s    Vitamin D deficiency     Wears hearing aid in both ears    [2]   Past Surgical History:  Procedure Laterality Date    BALLOON ANGIOPLASTY, ARTERY      iliac artery stent LLE    BLADDER TUMOR EXCISION  2022    Transurethral resection of bladder tumor    CATARACT EXTRACTION W/  INTRAOCULAR LENS IMPLANT Bilateral     Dr Alfonso    CERVICAL BIOPSY  W/ LOOP ELECTRODE EXCISION      cone biopsy     SECTION, LOW TRANSVERSE       section x3    COLONOSCOPY      CT ABDOMEN PELVIS ANGIOGRAM W AND/OR WO IV CONTRAST  2020    CT ABDOMEN PELVIS ANGIOGRAM W AND/OR WO IV CONTRAST [facility]    CYSTOSCOPY      with TURBT  multiple    IR INTERVENTION FILTER PLACEMENT      IVC FILTER RETRIEVAL      ORIF ANKLE FRACTURE Left     OTHER SURGICAL HISTORY      PARATHYROID GLAND SURGERY Left 04/10/2025    TONSILLECTOMY      TUBAL LIGATION  88    VARICOSE VEIN SURGERY  Left     varicose vein ligation   [3]   Social History  Tobacco Use    Smoking status: Former     Current packs/day: 0.00     Average packs/day: 1 pack/day for 25.0 years (25.0 ttl pk-yrs)     Types: Cigarettes     Start date:      Quit date:      Years since quittin.4    Smokeless tobacco: Never   Vaping Use    Vaping status: Never Used   Substance Use Topics    Alcohol use: Yes     Comment: I open a bottle of wine about once a month.    Drug use: Never     Types: Marijuana     Comment: back in high school   [4]   Family History  Problem Relation Name Age of Onset    Lupus Mother Evelin Garcíastbasia     Heart disease Mother Evelin Costello     Arthritis Mother Evelin Costello     Heart disease Father David Costello     Cancer Father David Costello     Arthritis Father David Costello     Hearing loss Father David Costello     Hypertension Father David Trang     Stroke Father David Costello     Vision loss Father David Costello     Arthritis Brother Yan Costello     Diabetes Brother Yan Costello     Hearing loss Brother Yan Costello     Breast cancer Paternal Grandmother      Accidental death Other Alber Larson (Son)     Alcohol abuse Other Alber Larson (Son)    [5]   Current Outpatient Medications   Medication Sig Dispense Refill    betamethasone dipropionate (Diprosone) 0.05 % lotion once daily.      blood sugar diagnostic strip 1 each 3 times a day. 300 each 3    cholecalciferol (Vitamin D-3) 125 MCG (5000 UT) capsule Take 1 capsule (125 mcg) by mouth once daily.      cyanocobalamin (Vitamin B-12) 100 mcg tablet Take 1 tablet (100 mcg) by mouth once daily.      diphenhydrAMINE (Sominex) 25 mg tablet Take 2 tablets (50 mg) by mouth as needed at bedtime for sleep.      Eliquis 5 mg tablet Take 1 tablet (5 mg) by mouth 2 times a day. Do not fill before 2025.      levothyroxine (Synthroid, Levoxyl) 100 mcg tablet Take 1 tablet (100 mcg) by mouth early in the morning.. Take on an empty stomach  at the same time each day, either 30 to 60 minutes prior to breakfast 90 tablet 3    metFORMIN (Glucophage) 500 mg tablet Take 2 tablets (1,000 mg) by mouth 2 times daily (morning and late afternoon). Do not fill before January 22, 2025.      omega 3-dha-epa-fish oil (Fish OiL) 1,200 (144-216) mg capsule Take 1 capsule (1,200 mg) by mouth once daily.      pantoprazole (ProtoNix) 40 mg EC tablet Take 1 tablet (40 mg) by mouth once daily in the morning. Take before meals. (Patient not taking: Reported on 4/23/2025)      phenazopyridine (Pyridium) 100 mg tablet Take 1 tablet (100 mg) by mouth 3 times a day as needed for bladder spasms. 10 tablet 0    rosuvastatin (Crestor) 20 mg tablet Take 1 tablet (20 mg) by mouth once daily. 90 tablet 3    traMADol (Ultram) 50 mg tablet Take 1 tablet (50 mg) by mouth every 8 hours if needed for severe pain (7 - 10). 10 tablet 0     No current facility-administered medications for this visit.

## 2025-06-20 ENCOUNTER — TELEPHONE (OUTPATIENT)
Dept: URGENT CARE | Age: 75
End: 2025-06-20

## 2025-06-20 ENCOUNTER — TUMOR BOARD CONFERENCE (OUTPATIENT)
Dept: HEMATOLOGY/ONCOLOGY | Facility: HOSPITAL | Age: 75
End: 2025-06-20
Payer: MEDICARE

## 2025-06-20 DIAGNOSIS — R22.1 NECK MASS: Primary | ICD-10-CM

## 2025-06-24 ENCOUNTER — DOCUMENTATION (OUTPATIENT)
Dept: ENDOCRINOLOGY | Facility: HOSPITAL | Age: 75
End: 2025-06-24
Payer: MEDICARE

## 2025-06-24 NOTE — PROGRESS NOTES
Latest Reference Range & Units 06/12/25 13:56   GLUCOSE 65 - 99 mg/dL 138 (H)   SODIUM 135 - 146 mmol/L 136   POTASSIUM 3.5 - 5.3 mmol/L 5.1   CHLORIDE 98 - 110 mmol/L 103   CARBON DIOXIDE 20 - 32 mmol/L 24   UREA NITROGEN (BUN) 7 - 25 mg/dL 22   CREATININE 0.60 - 1.00 mg/dL 1.03 (H)   EGFR > OR = 60 mL/min/1.73m2 57 (L)   BUN/CREATININE RATIO 6 - 22 (calc) 21   CALCIUM 8.6 - 10.4 mg/dL 11.2 (H)   PHOSPHATE (AS PHOSPHORUS) 2.1 - 4.3 mg/dL 2.8   ALBUMIN 3.6 - 5.1 g/dL 4.5   PARATHYROID HORMONE, INTACT 16 - 77 pg/mL 153 (H)   T4, FREE 0.8 - 1.8 ng/dL 1.7   TSH 0.40 - 4.50 mIU/L 1.08   VITAMIN D,25-OH,TOTAL,IA 30 - 100 ng/mL 40   (H): Data is abnormally high  (L): Data is abnormally low    PTH trended down and TSH is now WNL. Results discussed with patient via phoned, she was asked to continue same dose of LT4 100 mcg 6 tabs per week and asked patient to repeat RFP and 24h urine Ca, [place], Na and Cr to confirm [facility] dx as seen on her previous sample. However patient reported that she's been overwhelmed with her medical bills from all the testing that she's been having and would rather not repeat her 24h urine.

## 2025-06-26 ENCOUNTER — TELEPHONE (OUTPATIENT)
Dept: CARDIOLOGY | Facility: HOSPITAL | Age: 75
End: 2025-06-26
Payer: MEDICARE

## 2025-06-26 DIAGNOSIS — I87.1 MAY-THURNER SYNDROME: ICD-10-CM

## 2025-06-26 RX ORDER — APIXABAN 5 MG/1
5 TABLET, FILM COATED ORAL 2 TIMES DAILY
Qty: 60 TABLET | Refills: 0 | Status: SHIPPED | OUTPATIENT
Start: 2025-06-26

## 2025-07-01 ENCOUNTER — APPOINTMENT (OUTPATIENT)
Dept: PRIMARY CARE | Facility: CLINIC | Age: 75
End: 2025-07-01
Payer: MEDICARE

## 2025-07-01 VITALS
WEIGHT: 186 LBS | HEIGHT: 64 IN | OXYGEN SATURATION: 98 % | SYSTOLIC BLOOD PRESSURE: 129 MMHG | HEART RATE: 76 BPM | BODY MASS INDEX: 31.76 KG/M2 | DIASTOLIC BLOOD PRESSURE: 85 MMHG

## 2025-07-01 DIAGNOSIS — R53.82 CHRONIC FATIGUE: ICD-10-CM

## 2025-07-01 DIAGNOSIS — R22.1 NECK MASS: ICD-10-CM

## 2025-07-01 DIAGNOSIS — Z00.00 MEDICARE ANNUAL WELLNESS VISIT, SUBSEQUENT: Primary | ICD-10-CM

## 2025-07-01 DIAGNOSIS — R09.82 PND (POST-NASAL DRIP): ICD-10-CM

## 2025-07-01 DIAGNOSIS — M46.96 UNSPECIFIED INFLAMMATORY SPONDYLOPATHY, LUMBAR REGION: ICD-10-CM

## 2025-07-01 DIAGNOSIS — Z13.220 SCREENING FOR HYPERLIPIDEMIA: ICD-10-CM

## 2025-07-01 DIAGNOSIS — E21.0 PRIMARY HYPERPARATHYROIDISM (MULTI): ICD-10-CM

## 2025-07-01 DIAGNOSIS — E06.3 HASHIMOTO'S DISEASE: ICD-10-CM

## 2025-07-01 DIAGNOSIS — Z00.00 ROUTINE GENERAL MEDICAL EXAMINATION AT HEALTH CARE FACILITY: ICD-10-CM

## 2025-07-01 DIAGNOSIS — E11.9 TYPE 2 DIABETES MELLITUS WITHOUT COMPLICATION, WITHOUT LONG-TERM CURRENT USE OF INSULIN: ICD-10-CM

## 2025-07-01 PROBLEM — N17.9 ACUTE RENAL FAILURE SYNDROME: Status: RESOLVED | Noted: 2023-10-08 | Resolved: 2025-07-01

## 2025-07-01 PROCEDURE — 1159F MED LIST DOCD IN RCRD: CPT | Performed by: NURSE PRACTITIONER

## 2025-07-01 PROCEDURE — 1158F ADVNC CARE PLAN TLK DOCD: CPT | Performed by: NURSE PRACTITIONER

## 2025-07-01 PROCEDURE — 1036F TOBACCO NON-USER: CPT | Performed by: NURSE PRACTITIONER

## 2025-07-01 PROCEDURE — G0439 PPPS, SUBSEQ VISIT: HCPCS | Performed by: NURSE PRACTITIONER

## 2025-07-01 PROCEDURE — 99397 PER PM REEVAL EST PAT 65+ YR: CPT | Performed by: NURSE PRACTITIONER

## 2025-07-01 PROCEDURE — 1170F FXNL STATUS ASSESSED: CPT | Performed by: NURSE PRACTITIONER

## 2025-07-01 PROCEDURE — 99213 OFFICE O/P EST LOW 20 MIN: CPT | Performed by: NURSE PRACTITIONER

## 2025-07-01 PROCEDURE — 1160F RVW MEDS BY RX/DR IN RCRD: CPT | Performed by: NURSE PRACTITIONER

## 2025-07-01 PROCEDURE — 3079F DIAST BP 80-89 MM HG: CPT | Performed by: NURSE PRACTITIONER

## 2025-07-01 PROCEDURE — 3044F HG A1C LEVEL LT 7.0%: CPT | Performed by: NURSE PRACTITIONER

## 2025-07-01 PROCEDURE — 3074F SYST BP LT 130 MM HG: CPT | Performed by: NURSE PRACTITIONER

## 2025-07-01 PROCEDURE — 3008F BODY MASS INDEX DOCD: CPT | Performed by: NURSE PRACTITIONER

## 2025-07-01 ASSESSMENT — ACTIVITIES OF DAILY LIVING (ADL)
GROCERY_SHOPPING: INDEPENDENT
DRESSING: INDEPENDENT
TAKING_MEDICATION: INDEPENDENT
DOING_HOUSEWORK: INDEPENDENT
BATHING: INDEPENDENT
MANAGING_FINANCES: INDEPENDENT

## 2025-07-01 ASSESSMENT — ENCOUNTER SYMPTOMS
OCCASIONAL FEELINGS OF UNSTEADINESS: 1
DEPRESSION: 0
LOSS OF SENSATION IN FEET: 0

## 2025-07-01 ASSESSMENT — PATIENT HEALTH QUESTIONNAIRE - PHQ9
1. LITTLE INTEREST OR PLEASURE IN DOING THINGS: NOT AT ALL
SUM OF ALL RESPONSES TO PHQ9 QUESTIONS 1 AND 2: 0
2. FEELING DOWN, DEPRESSED OR HOPELESS: NOT AT ALL

## 2025-07-01 NOTE — PROGRESS NOTES
"Subjective   Patient ID: Latosha Larson is a 75 y.o. female who presents for Medicare Annual Wellness Visit Subsequent.    75 year old female here for annual CPE and Medicare Wellness Exam  Her rheum retired. Was monitored for parathyroid nodule  ENT following for parathyroid adenoma, attempted surgery but too close to Carotid. Awaiting input from vascular surgery  Hypothyroid-treated by endo  DM2- A1C 3/2025- 5.7% on MF. She will adjust according to her BS. Monitoring 4-5 times a day  Chronic back pain- DEXA reviewed- osteopenia. Pain in the thoracic spine.   Approx 1 month ago- viral URI-cough. Still has a dry cough and clearing of throat.   Gets hiccups that last a few seconds  Occasionally gets lightheaded in bed when change positions, rolling in bed. No falls  GERD- Sees GI. On PPI. Dr Martínez  DVT- on Eliquis. Cardio    DM2, GERD, May Thurner syndrome, history of DVT, degenerative joint disease, primary hypothyroidism, history of bladder CA s/p tumor resection, osteoporosis, hypercalcemia, neck mass      Prevention:  Breast Ca screen: paternal grandmother Breast Ca. Mamm 5/2024. Declines screening  Colon Ca Screen: no fam HX  Lung Ca Screen: non smoker. No fam HX. Quit smoking x 33 years  DEXA: 2023- Osteopenia. Her endo said it was osteoporosis, small fractures in the spine. She is asking for a specialist to help with pain  CT Cardiac Score: father- CVA in his 60's; CV disease both parents- Dad- MI, stent  Diabetes Screen: personal HX GDM, pre diabetes. Brother- DM2  Opthal: wears Bifocals glasses. Routine OV  Dental: routine . Recent cap replaced.   Exercise: little energy. Not very active.             Review of Systems    Objective   /85   Pulse 76   Ht 1.626 m (5' 4\")   Wt 84.4 kg (186 lb)   LMP  (LMP Unknown)   SpO2 98%   BMI 31.93 kg/m²     Physical Exam  Vitals reviewed.   Constitutional:       General: She is not in acute distress.     Appearance: Normal appearance. She is obese.   HENT:    "   Head: Normocephalic and atraumatic.   Eyes:      Extraocular Movements: Extraocular movements intact.      Conjunctiva/sclera: Conjunctivae normal.      Pupils: Pupils are equal, round, and reactive to light.   Neck:      Vascular: No carotid bruit.   Cardiovascular:      Rate and Rhythm: Normal rate and regular rhythm.      Pulses: Normal pulses.      Heart sounds: Normal heart sounds. No murmur heard.  Pulmonary:      Effort: Pulmonary effort is normal.      Breath sounds: Normal breath sounds.   Abdominal:      General: Bowel sounds are normal. There is no distension.      Palpations: Abdomen is soft.      Tenderness: There is no abdominal tenderness.   Musculoskeletal:         General: Normal range of motion.      Cervical back: Normal range of motion and neck supple.      Right lower leg: Edema (trace non pitting) present.      Left lower leg: Edema (trace non pitting) present.   Lymphadenopathy:      Cervical: No cervical adenopathy.   Skin:     General: Skin is warm and dry.   Neurological:      General: No focal deficit present.      Mental Status: She is alert and oriented to person, place, and time. Mental status is at baseline.   Psychiatric:         Mood and Affect: Mood normal.         Behavior: Behavior normal.         Thought Content: Thought content normal.         Assessment/Plan   Diagnoses and all orders for this visit:  Medicare annual wellness visit, subsequent  PND (post-nasal drip)  Comments:  try pepcid. I think she needs to get the surgery to see if it improves her swallowing  Orders:  -     Referral to Primary Care  Type 2 diabetes mellitus without complication, without long-term current use of insulin  Comments:  update the A1C. but the most recent was well controlled on MF  Orders:  -     Urine Microalbumin - Lab collect; Future  -     Hemoglobin A1C - Lab collect; Future  Routine general medical examination at health care facility  Comments:  declines screening  Orders:  -     1 Year  Follow Up In Primary Care - Wellness Exam; Future  Screening for hyperlipidemia  Comments:  update FLP  Orders:  -     Lipid Panel; Future  Chronic fatigue  Unspecified inflammatory spondylopathy, lumbar region  Comments:  she is requesting an appt with specialist to eval her pain  Orders:  -     Referral to Spine Surgery; Future  Primary hyperparathyroidism (Multi)  Comments:  Endo following  Hashimoto's disease  Comments:  Endo following  Neck mass  Other orders  -     Follow Up In Primary Care - Medicare Annual  -     Follow Up In Primary Care - Established; Future

## 2025-07-09 ENCOUNTER — OFFICE VISIT (OUTPATIENT)
Dept: VASCULAR SURGERY | Facility: HOSPITAL | Age: 75
End: 2025-07-09
Payer: MEDICARE

## 2025-07-09 VITALS
WEIGHT: 188.2 LBS | HEART RATE: 91 BPM | BODY MASS INDEX: 32.13 KG/M2 | OXYGEN SATURATION: 98 % | HEIGHT: 64 IN | SYSTOLIC BLOOD PRESSURE: 138 MMHG | DIASTOLIC BLOOD PRESSURE: 85 MMHG

## 2025-07-09 DIAGNOSIS — R22.1 NECK MASS: Primary | ICD-10-CM

## 2025-07-09 PROCEDURE — 99204 OFFICE O/P NEW MOD 45 MIN: CPT | Performed by: SURGERY

## 2025-07-09 PROCEDURE — 3044F HG A1C LEVEL LT 7.0%: CPT | Performed by: SURGERY

## 2025-07-09 PROCEDURE — 1159F MED LIST DOCD IN RCRD: CPT | Performed by: SURGERY

## 2025-07-09 PROCEDURE — 3079F DIAST BP 80-89 MM HG: CPT | Performed by: SURGERY

## 2025-07-09 PROCEDURE — 3008F BODY MASS INDEX DOCD: CPT | Performed by: SURGERY

## 2025-07-09 PROCEDURE — 3075F SYST BP GE 130 - 139MM HG: CPT | Performed by: SURGERY

## 2025-07-09 PROCEDURE — 1036F TOBACCO NON-USER: CPT | Performed by: SURGERY

## 2025-07-09 PROCEDURE — 99214 OFFICE O/P EST MOD 30 MIN: CPT | Performed by: SURGERY

## 2025-07-09 PROCEDURE — 1126F AMNT PAIN NOTED NONE PRSNT: CPT | Performed by: SURGERY

## 2025-07-09 ASSESSMENT — PAIN SCALES - GENERAL: PAINLEVEL_OUTOF10: 0-NO PAIN

## 2025-07-09 NOTE — PROGRESS NOTES
Vascular Surgery Clinic Note    CC: left neck mass    HPI:  Latosha Larson is 75 y.o. female with history of osteopenia, ankle fracture, hypothyroidism, and rheumatologic condition who was found to have a left neck mass suspicious for primary hyperparathyroidism.  She underwent attempted resection however the lesion was noted to be adherent to the left common carotid artery and subsequently aborted.  She is scheduled for repeat attempt at resection with vascular available for help if carotid reconstruction is needed.      Past Vascular History:  None    Past Vascular Testing:  None    Medical History:  Problem List[1]     Meds:   Medications Ordered Prior to Encounter[2]     Allergies:   RX Allergies[3]    SH:    Social Drivers of Health     Tobacco Use: Medium Risk (7/9/2025)    Patient History     Smoking Tobacco Use: Former     Smokeless Tobacco Use: Never     Passive Exposure: Not on file   Alcohol Use: Not At Risk (11/14/2024)    AUDIT-C     Frequency of Alcohol Consumption: Monthly or less     Average Number of Drinks: 1 or 2     Frequency of Binge Drinking: Never   Financial Resource Strain: Not on file   Food Insecurity: Not on file   Transportation Needs: Not on file   Physical Activity: Not on file   Stress: Not on file   Social Connections: Not on file   Intimate Partner Violence: Not on file   Depression: Not at risk (7/1/2025)    PHQ-2     PHQ-2 Score: 0   Housing Stability: Not on file   Utilities: Not on file   Digital Equity: Not on file   Health Literacy: Not on file        FH:  Family History[4]     ROS:  All systems were reviewed and are negative except as per HPI.    Objective:  Vitals:  Vitals:    07/09/25 1139   BP: 138/85   Pulse:    SpO2:         Exam:  Constitutional: normal, well appearing  HEENT: normocephalic  CV: regular rate  RESP: symmetric expansion, unlabored breathing  GI: soft, nontender, nondistended  MSK: normal ROM  INT: no lesions  PSYCH: appropriate mood  NEURO: no  deficits  VASC: 2+ palpable bilateral radial pulses    Assessment & Plan:  Latosha Larson is 75 y.o. female with left neck mass suspicious for primary hyperparathyroidism  - Tentative OR date of 8/20 with Dr. Keyes  - Discussed options for reconstruction including primary repair vs vein patch vs interposition graft  - Vein mapping ordered and scheduled to be done 8/13    Wesley Boyd MD         [1]   Patient Active Problem List  Diagnosis    May-Thurner syndrome    Depression, major, single episode, mild    Chronic back pain    Mixed hyperlipidemia    Multiple lung nodules    Personal history of DVT (deep vein thrombosis)    Type 2 diabetes mellitus    Urothelial carcinoma    Vitamin B12 deficiency (non anemic)    Unspecified inflammatory spondylopathy, lumbar region    Embolism and thrombosis of arteries of the lower extremities    Acquired hypothyroidism    Medicare annual wellness visit, subsequent    Anxiety    Artificial lens present    Biliary colic    Bladder mass    Irritable bladder    Cardiomegaly    Chronic fatigue    Chronic sinusitis    Complement 4 deficiency (Multi)    Deep venous thrombosis (Multi)    Degenerative joint disease    Dextroscoliosis    Early dry stage nonexudative age-related macular degeneration of both eyes    Has daytime drowsiness    Autoimmune thyroiditis    Hashimoto's disease    Hypertensive disorder    Idiopathic chronic gout of multiple sites without tophus    Insomnia due to medical condition    Irritable bowel syndrome with diarrhea    Lumbar spondylosis    Memory difficulty    Class 1 obesity with body mass index (BMI) of 34.0 to 34.9 in adult    Systemic involvement of connective tissue, unspecified    Primary osteoarthritis of knees, bilateral    Raised antibody titer    Refractive errors    Snoring    Vitamin D deficiency    Hormone imbalance    Malignant neoplasm of urinary bladder (Multi)    Anterior basement membrane dystrophy of both eyes    Primary  hyperparathyroidism (Multi)    Neck mass   [2]   Current Outpatient Medications on File Prior to Visit   Medication Sig Dispense Refill    betamethasone dipropionate (Diprosone) 0.05 % lotion once daily.      blood sugar diagnostic strip 1 each 3 times a day. 300 each 3    cholecalciferol (Vitamin D-3) 125 MCG (5000 UT) capsule Take 1 capsule (125 mcg) by mouth once daily.      cyanocobalamin (Vitamin B-12) 100 mcg tablet Take 1 tablet (100 mcg) by mouth once daily.      Eliquis 5 mg tablet Take 1 tablet (5 mg) by mouth 2 times a day. 60 tablet 0    levothyroxine (Synthroid, Levoxyl) 100 mcg tablet Take 1 tablet (100 mcg) by mouth early in the morning.. Take on an empty stomach at the same time each day, either 30 to 60 minutes prior to breakfast 90 tablet 3    metFORMIN (Glucophage) 500 mg tablet Take 2 tablets (1,000 mg) by mouth 2 times daily (morning and late afternoon). Do not fill before January 22, 2025.      omega 3-dha-epa-fish oil (Fish OiL) 1,200 (144-216) mg capsule Take 1 capsule (1,200 mg) by mouth once daily.      pantoprazole (ProtoNix) 40 mg EC tablet Take 1 tablet (40 mg) by mouth once daily in the morning. Take before meals.      rosuvastatin (Crestor) 20 mg tablet Take 1 tablet (20 mg) by mouth once daily. 90 tablet 3    diphenhydrAMINE (Sominex) 25 mg tablet Take 2 tablets (50 mg) by mouth as needed at bedtime for sleep. (Patient not taking: Reported on 7/9/2025)      phenazopyridine (Pyridium) 100 mg tablet Take 1 tablet (100 mg) by mouth 3 times a day as needed for bladder spasms. (Patient not taking: Reported on 7/9/2025) 10 tablet 0    traMADol (Ultram) 50 mg tablet Take 1 tablet (50 mg) by mouth every 8 hours if needed for severe pain (7 - 10). (Patient not taking: Reported on 7/9/2025) 10 tablet 0     No current facility-administered medications on file prior to visit.   [3]   Allergies  Allergen Reactions    Flexeril [Cyclobenzaprine] Hives, Rash and Other    Augmentin [Amoxicillin-Pot  Clavulanate] Other and Nausea/vomiting    Bee Venom Protein (Honey Bee) Swelling    Pneumococcal 23-Katelyn Ps Vaccine Other and Hives    Venom-Wasp Swelling    Venom-Wasp Protein Swelling   [4]   Family History  Problem Relation Name Age of Onset    Lupus Mother Evelin Costello     Heart disease Mother Evelin Costello     Arthritis Mother Evelin Costello     Heart disease Father David Costello     Cancer Father David Costello     Arthritis Father David Costello     Hearing loss Father David Tiwaribasia     Hypertension Father David Trang     Stroke Father David Kariebasia     Vision loss Father David Raquelbasia     Arthritis Brother Yan Tiwaribasia     Diabetes Brother Yan Costello     Hearing loss Brother Yan Costello     Breast cancer Paternal Grandmother      Accidental death Other Alber Larson (Son)     Alcohol abuse Other Alber Larson (Son)

## 2025-07-10 DIAGNOSIS — R22.1 NECK MASS: ICD-10-CM

## 2025-07-26 NOTE — PROGRESS NOTES
Subjective   Patient ID: Latosha Larson is a 75 y.o. female.    PROCEDURE NOTE:    PREOPERATIVE DIAGNOSIS:  recurrent LG NMIBC,   status post BCG  Last recurrence: 01/25/2025 underwent a TURBT     POSTOPERATIVE DIAGNOSIS:  Same    OPERATION:  Flexible Cystourethroscopy    SURGEON:  Stefany Phipps MD    ANESTHESIA:  2%  lidocaine jelly    COMPLICATIONS:  None    EBL: Minimal    SPECIMEN:  Voided urine was not collected and submitted for cytology.    DISPOSITION:  The patient was discharged home after the procedure, per routine.    INDICATIONS: :  Ms. Larson is a 75 y.o. patient with a history of recurrent LG NMIBC who presents today for Cystoscopy.     The indications, risks and benefits of this procedure were discussed with the patient, consent was obtained prior to the procedure, and to the best of my judgement the patient seemed to understand and agree to the procedure.    PROCEDURE:  The patient  was brought into the procedure suite and informed consent was reviewed and confirmed. Vital signs were obtained prior to the procedure: LMP  (LMP Unknown) .  The patient was escorted onto the stretcher, placed supine, prepped with betadine and draped in the usual standard surgical fashion.  Intraurethral 2% viscous lidocaine jelly was used for local analgesia.  A 16 Polish flexible cystourethroscope was inserted into the urethra.   The urethra was normal.  Upon entering the bladder the entire bladder was surveyed in a 360 degree fashion.  The left and right ureteral orifices were in normal orthotopic position effluxing clear yellow urine, bilaterally.   There was no evidence of any bladder lesions, foreign objects, stones or evidence of any mucosal changes. The cystoscope was then retroflexed.  The bladder neck was then further examined without any evidence of lesions. The scope was then removed and in an antegrade fashion, the urethra and bladder were again resurveyed with no evidence of additional lesions.  The  cystoscope was then fully removed.   The patient tolerated the procedure well.  Vitals were stable after the procedure.  The patient was able to void and was discharged home.  Verbal and written Post procedure instructions were reviewed with the patient.    IMPRESSION:  No recurrence    PLAN:  Repeat cystoscopy in 3 months     HPI  75 y.o. female presents for a cystoscopy today in office. During a cystoscopy on 12/4/2024, the patient had a 2.5 cm papillary tumor on the dome. The patient underwent TURBT on 01/21/2025.  She had a negative cystoscopy on 04/28/2025.     She is status post BCG in early 2024.      Review of Systems  A complete review of systems was performed. All systems are noted to be negative unless indicated in the history of present illness, impression, active problem list, or past histories.     Objective   Physical Exam    Assessment/Plan   Diagnoses and all orders for this visit:  Malignant neoplasm of anterior wall of urinary bladder (Multi)  -     Cystoscopy; Future      75 y.o. female presents for a cystoscopy today in office. During a cystoscopy on 12/4/2024, the patient had a 2.5 cm papillary tumor on the dome. The patient underwent TURBT on 01/21/2025.  She had a negative cystoscopy on 04/28/2025. She has previously underwent BCG treatments in early 2024.     The patient underwent a cystoscopy today in office. The patient tolerated the procedure well without incidence. The details are above. The results are no recurrence. I have ordered a repeat cystoscopy in 3 months.      Plan:  Repeat cystoscopy in 3 months       Scribe Attestation   By signing my name below, I, Arsh Saleem, Scribe attestation that this documentation has been prepared under the direction and in the presence of Stefany Phipps MD.

## 2025-07-28 ENCOUNTER — CLINICAL SUPPORT (OUTPATIENT)
Dept: PREADMISSION TESTING | Facility: HOSPITAL | Age: 75
End: 2025-07-28
Payer: MEDICARE

## 2025-07-28 ENCOUNTER — APPOINTMENT (OUTPATIENT)
Dept: UROLOGY | Facility: CLINIC | Age: 75
End: 2025-07-28
Payer: MEDICARE

## 2025-07-28 DIAGNOSIS — C67.3 MALIGNANT NEOPLASM OF ANTERIOR WALL OF URINARY BLADDER (MULTI): ICD-10-CM

## 2025-07-28 PROCEDURE — 52000 CYSTOURETHROSCOPY: CPT | Performed by: STUDENT IN AN ORGANIZED HEALTH CARE EDUCATION/TRAINING PROGRAM

## 2025-07-28 PROCEDURE — 99213 OFFICE O/P EST LOW 20 MIN: CPT | Performed by: STUDENT IN AN ORGANIZED HEALTH CARE EDUCATION/TRAINING PROGRAM

## 2025-07-28 RX ORDER — FAMOTIDINE 20 MG/1
TABLET, FILM COATED ORAL
COMMUNITY

## 2025-07-28 ASSESSMENT — ENCOUNTER SYMPTOMS
NEUROLOGICAL NEGATIVE: 1
CARDIOVASCULAR NEGATIVE: 1
ENDOCRINE NEGATIVE: 1
MUSCULOSKELETAL NEGATIVE: 1
GASTROINTESTINAL NEGATIVE: 1
RESPIRATORY NEGATIVE: 1
BRUISES/BLEEDS EASILY: 1
NECK NEGATIVE: 1
CONSTITUTIONAL NEGATIVE: 1

## 2025-07-28 ASSESSMENT — DUKE ACTIVITY SCORE INDEX (DASI)
CAN YOU PARTICIPATE IN STRENOUS SPORTS LIKE SWIMMING, SINGLES TENNIS, FOOTBALL, BASKETBALL, OR SKIING: NO
CAN YOU HAVE SEXUAL RELATIONS: YES
CAN YOU DO YARD WORK LIKE RAKING LEAVES, WEEDING OR PUSHING A MOWER: NO
DASI METS SCORE: 6.7
CAN YOU DO MODERATE WORK AROUND THE HOUSE LIKE VACUUMING, SWEEPING FLOORS OR CARRYING GROCERIES: YES
CAN YOU WALK A BLOCK OR TWO ON LEVEL GROUND: YES
CAN YOU PARTICIPATE IN MODERATE RECREATIONAL ACTIVITIES LIKE GOLF, BOWLING, DANCING, DOUBLES TENNIS OR THROWING A BASEBALL OR FOOTBALL: NO
CAN YOU TAKE CARE OF YOURSELF (EAT, DRESS, BATHE, OR USE TOILET): YES
CAN YOU RUN A SHORT DISTANCE: NO
CAN YOU DO HEAVY WORK AROUND THE HOUSE LIKE SCRUBBING FLOORS OR LIFTING AND MOVING HEAVY FURNITURE: YES
TOTAL_SCORE: 32.2
CAN YOU DO LIGHT WORK AROUND THE HOUSE LIKE DUSTING OR WASHING DISHES: YES
CAN YOU WALK INDOORS, SUCH AS AROUND YOUR HOUSE: YES
CAN YOU CLIMB A FLIGHT OF STAIRS OR WALK UP A HILL: YES

## 2025-07-28 NOTE — CPM/PAT H&P
[facility]/PAT Evaluation       Name: Latosha Larson (Latosha Larson)  /Age: 1950/75 y.o.     { PAT Visit Type:17418}      Chief Complaint: ***    HPI    Latosha Larson is scheduled for EXCISION, LESION, NECK - Left, PARATHYROIDECTOMY - Left  LOBECTOMY, THYROID - Left, DISSECTION, NECK - Left, EXPLORATION, BLOOD VESSEL, HEAD AND NECK REGION - Left, CREATION, BYPASS, ARTERIAL, CAROTID, USING GRAFT - Left with Dr. Keyes and Dr. Boyd on 25.  Medical History[1]    Surgical History[2]    Patient  reports that she is not currently sexually active and has had partner(s) who are male. She reports using the following methods of birth control/protection: Abstinence, Coitus interruptus, Condom Male, Diaphragm, Post-menopausal, Rhythm, and Female Sterilization.    Family History[3]    Allergies[4]    Prior to Admission medications   Medication Sig Start Date End Date Taking? Authorizing Provider   betamethasone dipropionate (Diprosone) 0.05 % lotion once daily. 24   Historical Provider, MD   blood sugar diagnostic strip 1 each 3 times a day. 24   ERICA Loaiza-CNP   cholecalciferol (Vitamin D-3) 125 MCG (5000 UT) capsule Take 1 capsule (125 mcg) by mouth once daily.    Historical Provider, MD   cyanocobalamin (Vitamin B-12) 100 mcg tablet Take 1 tablet (100 mcg) by mouth once daily.    Historical Provider, MD   diphenhydrAMINE (Sominex) 25 mg tablet Take 2 tablets (50 mg) by mouth as needed at bedtime for sleep.  Patient not taking: Reported on 2025    Historical Provider, MD   Eliquis 5 mg tablet Take 1 tablet (5 mg) by mouth 2 times a day. 25   Rosetta Hensley MD   levothyroxine (Synthroid, Levoxyl) 100 mcg tablet Take 1 tablet (100 mcg) by mouth early in the morning.. Take on an empty stomach at the same time each day, either 30 to 60 minutes prior to breakfast 24  Willy Lord MD   metFORMIN (Glucophage) 500 mg tablet Take 2 tablets (1,000 mg) by mouth 2 times daily  (morning and late afternoon). Do not fill before January 22, 2025. 1/22/25   FAITH Ruffin   omega 3-dha-epa-fish oil (Fish OiL) 1,200 (144-216) mg capsule Take 1 capsule (1,200 mg) by mouth once daily.    Historical Provider, MD   pantoprazole (ProtoNix) 40 mg EC tablet Take 1 tablet (40 mg) by mouth once daily in the morning. Take before meals. 10/19/21   Historical Provider, MD   phenazopyridine (Pyridium) 100 mg tablet Take 1 tablet (100 mg) by mouth 3 times a day as needed for bladder spasms.  Patient not taking: Reported on 7/9/2025 1/21/25   FAITH Ruffin   rosuvastatin (Crestor) 20 mg tablet Take 1 tablet (20 mg) by mouth once daily. 11/18/24   FAITH Loaiza   traMADol (Ultram) 50 mg tablet Take 1 tablet (50 mg) by mouth every 8 hours if needed for severe pain (7 - 10).  Patient not taking: Reported on 7/9/2025 1/21/25   FAITH Ruffin        PAT ROS:   Constitutional:   neg    Neuro/Psych:   neg    Eyes:    use of corrective lenses  Ears:    hearing loss   hearing aides  Nose:   neg    Mouth:   neg    Throat:   neg    Neck:   neg    Cardio:   neg    Respiratory:   neg    Endocrine:   neg    GI:   neg    :   neg    Musculoskeletal:   neg    Hematologic:    bruises/bleeds easily   history of blood transfusion  Skin:  neg        PAT Physical Exam     Airway        Visit Vitals  LMP  (LMP Unknown)   OB Status Postmenopausal   Smoking Status Former       DASI Risk Score      Flowsheet Row Pre-Admission Testing from 3/27/2025 in [facility] Pre-Admission Testing from 1/14/2025 in [facility]   Can you take care of yourself (eat, dress, bathe, or use toilet)?  2.75 filed at 03/27/2025 1331 2.75 filed at 01/14/2025 1103   Can you walk indoors, such as around your house? 1.75 filed at 03/27/2025 1331 1.75 filed at 01/14/2025 1103   Can you walk a block or two on level ground?  2.75 filed at 03/27/2025 1331 2.75 filed at 01/14/2025 1103   Can  you climb a flight of stairs or walk up a hill? 5.5 filed at 03/27/2025 1331 5.5 filed at 01/14/2025 1103   Can you run a short distance? 0 filed at 03/27/2025 1331 0 filed at 01/14/2025 1103   Can you do light work around the house like dusting or washing dishes? 2.7 filed at 03/27/2025 1331 2.7 filed at 01/14/2025 1103   Can you do moderate work around the house like vacuuming, sweeping floors or carrying groceries? 3.5 filed at 03/27/2025 1331 3.5 filed at 01/14/2025 1103   Can you do heavy work around the house like scrubbing floors or lifting and moving heavy furniture?  0 filed at 03/27/2025 1331 8 filed at 01/14/2025 1103   Can you do yard work like raking leaves, weeding or pushing a mower? 0 filed at 03/27/2025 1331 0 filed at 01/14/2025 1103   Can you have sexual relations? 5.25 filed at 03/27/2025 1331 0 filed at 01/14/2025 1103   Can you participate in moderate recreational activities like golf, bowling, dancing, doubles tennis or throwing a baseball or football? 0 filed at 03/27/2025 1331 0 filed at 01/14/2025 1103   Can you participate in strenous sports like swimming, singles tennis, football, basketball, or skiing? 0 filed at 03/27/2025 1331 0 filed at 01/14/2025 1103   DASI SCORE 24.2 filed at 03/27/2025 1331 26.95 filed at 01/14/2025 1103   METS Score (Will be calculated only when all the questions are answered) 5.7 filed at 03/27/2025 1331 6.1 filed at 01/14/2025 1103     [place] DVT Assessment      Flowsheet Row Pre-Admission Testing from 1/14/2025 in [facility] [place] Ultrasound Guided Procedure from 1/2/2025 in [facility] with Demetir Gonzales MD, Lida Sharp, ALEKSANDAR and Mallika Damico, RN   DVT Score (IF A SCORE IS NOT CALCULATING, MUST SELECT A BMI TO COMPLETE) 9 filed at 01/14/2025 1132 5 filed at 01/02/2025 0948   Medical Factors History of DVT/PE filed at 01/14/2025 1132 --   Surgical Factors Minor surgery planned filed at 01/14/2025 1132 --   BMI (BMI MUST BE  CHOSEN) 31-40 (Obesity) filed at 01/14/2025 1132 31-40 (Obesity) filed at 01/02/2025 0948     Modified Frailty Index    No data to display  [id number] Stroke Risk Points  Current as of just now        N/A 0 to 9 Points:      Last Change: N/A          The [id number] risk score (Eloy CLAYTON, et al. 2009. © 2010 American College [employer]) quantifies the risk of stroke for a patient with atrial fibrillation. For patients without atrial fibrillation or under the age of 18 this score appears as N/A. Higher score values generally indicate higher risk of stroke.        This score is not applicable to this patient. Components are not calculated.          Revised Cardiac Risk Index      Flowsheet Row Pre-Admission Testing from 3/27/2025 in [facility] Pre-Admission Testing from 1/14/2025 in [facility]   High-Risk Surgery (Intraperitoneal, Intrathoracic,Suprainguinal vascular) 1 filed at 03/27/2025 1416 0 filed at 01/14/2025 1144   History of ischemic heart disease (History of MI, History of positive exercuse test, Current chest paint considered due to myocardial ischemia, Use of nitrate therapy, ECG with pathological Q Waves) 0 filed at 03/27/2025 1416 0 filed at 01/14/2025 1144   History of congestive heart failure (pulmonary edemia, bilateral rales or S3 gallop, Paroxysmal nocturnal dyspnea, CXR showing pulmonary vascular redistribution) 0 filed at 03/27/2025 1416 0 filed at 01/14/2025 1144   History of cerebrovascular disease (Prior TIA or stroke) 0 filed at 03/27/2025 1416 0 filed at 01/14/2025 1144   Pre-operative insulin treatment 0 filed at 03/27/2025 1416 0 filed at 01/14/2025 1144   Pre-operative creatinine>2 mg/dl 0 filed at 03/27/2025 1416 0 filed at 01/14/2025 1144   Revised Cardiac Risk Calculator 1 filed at 03/27/2025 1416 0 filed at 01/14/2025 1144     Apfel Simplified Score      Flowsheet Row Pre-Admission Testing from 2/27/2024 in [facility]   Smoking  status 1 filed at 02/27/2024 1356   History of motion sickness or PONV  0 filed at 02/27/2024 1356   Use of postoperative opioids 1 filed at 02/27/2024 1356   Apfel Simplified Score Calculator 3 filed at 02/27/2024 1356     Risk Analysis Index Results This Encounter    No data found in the last 10 encounters.       Stop Bang Score      Flowsheet Row Admission (Discharged) from 4/10/2025 in [facility] OR with Eric Rojas MD Pre-Admission Testing from 3/27/2025 in [facility]   Do you snore loudly? 0 filed at 04/10/2025 0949 0 filed at 03/27/2025 1331   Do you often feel tired or fatigued after your sleep? 1 filed at 04/10/2025 0949 1 filed at 03/27/2025 1331   Has anyone ever observed you stop breathing in your sleep? 0 filed at 04/10/2025 0949 0 filed at 03/27/2025 1331   Do you have or are you being treated for high blood pressure? 0 filed at 04/10/2025 0949 0 filed at 03/27/2025 1331   Recent BMI (Calculated) 32.3 filed at 04/10/2025 0949 32.3 filed at 03/27/2025 1331   Is BMI greater than 35 kg/m2? 0=No filed at 04/10/2025 0949 0=No filed at 03/27/2025 1331   Age older than 50 years old? 1=Yes filed at 04/10/2025 0949 1=Yes filed at 03/27/2025 1331   Is your neck circumference greater than 17 inches (Male) or 16 inches (Female)? 0 filed at 04/10/2025 0949 0 filed at 03/27/2025 1331   Gender - Male 0=No filed at 04/10/2025 0949 0=No filed at 03/27/2025 1331   STOP-BANG Total Score 2 filed at 04/10/2025 0949 2 filed at 03/27/2025 1331     Prodigy: High Risk  Total Score: 15              Prodigy Age Score      Prodigy Previous Opioid Use Score           ARISCAT Score for Postoperative Pulmonary Complications      Flowsheet Row Pre-Admission Testing from 1/14/2025 in [facility]   Age Calculated Score 3 filed at 01/14/2025 1146   Preoperative SpO2 0 filed at 01/14/2025 1146   Respiratory infection in the last month Either upper or lower (i.e., URI, bronchitis,  pneumonia), with fever and antibiotic treatment 0 filed at 01/14/2025 1146   Preoperative anemia (Hgb less than 10 g/dl) 0 filed at 01/14/2025 1146   Surgical incision  0 filed at 01/14/2025 1146   Duration of surgery  0 filed at 01/14/2025 1146   Emergency Procedure  0 filed at 01/14/2025 1146   ARISCAT Total Score  3 filed at 01/14/2025 1146     Keiry Perioperative Risk for Myocardial Infarction or Cardiac Arrest (MIC)      Flowsheet Row Pre-Admission Testing from 1/14/2025 in [facility]   Calculated Age Score 1.46 filed at 01/14/2025 1146   Functional Status  0 filed at 01/14/2025 1146   ASA Class  -3.29 filed at 01/14/2025 1146   Creatinine 0 filed at 01/14/2025 1146   Type of Procedure  -0.26 filed at 01/14/2025 1146   MIC Total Score  -7.34 filed at 01/14/2025 1146   MIC % 0.06 filed at 01/14/2025 1146     Testing/Diagnostic:   Vein Mapping Scheduled for 8/13    ECG; 6/6/25  Normal sinus rhythm  Possible Anterior infarct , age undetermined  Abnormal ECG  When compared with ECG of 14-JAN-2025 11:20,  Borderline criteria for Anterior infarct are now Present    CT ANGIO CHEST FOR PULMONARY EMBOLISM; 6/6/2025   IMPRESSION:  No acute pulmonary embolus to the segmental level.  Enlarged main pulmonary artery measures up to 3.7 cm. Findings may be  seen with pulmonary arterial hypertension.  Opacities along the dependent aspect of the lower lobes bilaterally  as well as the lingula favored to represent atelectasis.    [facility] US CAROTID ARTERY DUPLEX BILATERAL; 5/8/25  CONCLUSIONS:  Right Carotid: The right proximal internal carotid artery is normal. Right external carotid artery appears patent with no evidence of stenosis. The right vertebral artery is patent with antegrade flow. No evidence of hemodynamically significant stenosis in the right subclavian artery.  Left Carotid: Findings are consistent with less than 50% stenosis of the left proximal internal carotid artery. Left external carotid artery  appears patent with no evidence of stenosis. There is a a non-vascularized, echogenic mass noted in the left side of the neck inferior to the proximal carotid artery measuring approximately 2.3 cm x 1.1 cm. The left vertebral artery is patent with antegrade flow. No evidence of hemodynamically significant stenosis in the left subclavian artery.    NM CT PARATHYROID SPECT; 1/27/2025   IMPRESSION:  1. 6 mm nodule with mildly increased radiotracer uptake, lateral to  the left thyroid lobe seen on SPECT CT, compatible with a  biopsy-proven parathyroid adenoma.  2. Images saved into PACS for review    ECHO; 11/16/2020  Exam quality: fair   The left ventricular chamber size is normal.   There is normal left ventricular systolic function.   Estimated ejection fraction is 55-59%.   The right ventricular global systolic function is normal.   Aortic valve is structurally normal. No evidence of regurgitation.   Mitral valve is structurally normal. No evidence of regurgitation.   Trivial tricuspid regurgitation.   Unable to estimate the right ventricular systolic     Patient Specialist/PCP:   Vascular Surgery   7/9/25-Wesley Boyd MD - seen for left neck mass. She underwent attempted resection however the lesion was noted to be adherent to the left common carotid artery and subsequently aborted. Vascular surgery will be available for repeat surgery.     PCP  7/1/25- ERICA Loaiza-CNP - presents for Medicare Annual Wellness Visit     Endocrinology   6/24/25-Willy Lord MD(fellow)     Vascular Medicine   6/19/25- Anupama Frost MD - seen for history of left leg DVT; May-Thurner syndrome with stent done at [facility] complicated by retroperitoneal bleed. Had IVC filter, which was ultimately retrieved. Patient is on Eliquis.  Staff Message sent on 7/28 requesting pre-op Eliquis instructions.    Otolaryngology   6/18/25-Charan Keyes MD - telephone encounter to review pathology   4/23/25- office visit - seen for  left neck mass     Urology   4/28/25- Stefany Phipps MD presents today for Cystoscopy.     Assessment and Plan:   Unable to reach the patient. LVM.  ONLY  Patient returned call and health history reviewed.    Sonal Centeno RN  Pre-Admission Testing   {[facility] ASSESSMENT AND PLAN:79831}             [1]   Past Medical History:  Diagnosis Date    Allergic rhinitis 07/03/2023    Ankle fracture, left 07/03/2023    Arthritis 2015    knees    Bladder cancer (Multi)     s/p tumor resection    Body mass index (BMI) 33.0-33.9, adult 06/21/2021    Body mass index (BMI) of 33.0 to 33.9 in adult    Bursitis of right knee 10/08/2023    Cardiomegaly     Cataract 2013    removed in June/July 2014    Chronic anticoagulation     Chronic back pain     Chronic pain disorder     Class 2 obesity with body mass index (BMI) of 35.0 to 35.9 in adult 02/27/2024    35.31 kg/m²    Closed dislocation of ankle 10/08/2023    Cognitive decline Dec, 2020    Complement 4 deficiency (Multi)     denies    Compression of vein 07/03/2023    Depression     DJD (degenerative joint disease)     DVT (deep venous thrombosis) (Multi)     2020  left leg    Dysphagia 07/03/2023    Fibromyalgia     Fibromyalgia, primary 1981    Went to numerous docs before getting diagnosed    GERD (gastroesophageal reflux disease)     Gestational diabetes 1978, 1982, 1986    Hashimoto's disease     History of blood transfusion     2020  after surgery    HL (hearing loss) 2015    HLD (hyperlipidemia)     HTN (hypertension)     denies    Hypercalcemia 10/08/2023    Hyperparathyroidism (Multi)     Hypothyroidism     IBS (irritable bowel syndrome)     Kidney stone 10/08/2023    Lactic acidosis 10/08/2023    Lower leg edema 10/08/2023    Lumbar spondylosis     Lung nodules     multiple    Macular degeneration     Malignant neoplasm of bladder     May-Thurner syndrome     Neck mass     OA (osteoarthritis)     Osteopenia     Osteoporosis     Palpitations  2023    PE (pulmonary thromboembolism) (Multi)         Peptic ulcer 07/10/2008    Pregnant ([facility] births 1978, 1983, 1987    Pulmonary embolism     Pulmonary nodule     Right upper quadrant pain 2021    Abdominal pain, RUQ    Sepsis (Multi) 10/08/2023    Type 2 diabetes mellitus     A1c 5.7 on 3/27/25    Urinary tract infection about 3 in 70s    Not since the 1980s    Vitamin D deficiency     Wears hearing aid in both ears    [2]   Past Surgical History:  Procedure Laterality Date    BALLOON ANGIOPLASTY, ARTERY      iliac artery stent LLE    BLADDER TUMOR EXCISION  2022    Transurethral resection of bladder tumor    CATARACT EXTRACTION W/  INTRAOCULAR LENS IMPLANT Bilateral     Dr Alfonso    CERVICAL BIOPSY  W/ LOOP ELECTRODE EXCISION      cone biopsy     SECTION, LOW TRANSVERSE       section x3    COLONOSCOPY      CT ABDOMEN PELVIS ANGIOGRAM W AND/OR WO IV CONTRAST  2020    CT ABDOMEN PELVIS ANGIOGRAM W AND/OR WO IV CONTRAST [facility]    CYSTOSCOPY      with TURBT  multiple    CYSTOURETHROSCOPY      IR INTERVENTION FILTER PLACEMENT      IVC FILTER RETRIEVAL      ORIF ANKLE FRACTURE Left     PARATHYROID GLAND SURGERY Left 04/10/2025    TONSILLECTOMY      TUBAL LIGATION  88    VARICOSE VEIN SURGERY Left     varicose vein ligation   [3]   Family History  Problem Relation Name Age of Onset    Lupus Mother Evelin Borstnik     Heart disease Mother Evelin Borstnik     Arthritis Mother Evelin Borstnik     Heart disease Father David Borstnik     Cancer Father David Borstnik     Arthritis Father David Borstnik     Hearing loss Father David Borstnik     Hypertension Father David Borstnik     Stroke Father David Borstnik     Vision loss Father David Borstnik     Arthritis Brother Yan Borstbasia     Diabetes Brother Yan Borstbasia     Hearing loss Brother Yan Borstbasia     Breast cancer Paternal Grandmother      Accidental death Other Alber Larson (Son)     Alcohol abuse Other  Abler Larson (Son)    [4]   Allergies  Allergen Reactions    Flexeril [Cyclobenzaprine] Hives, Rash and Other    Augmentin [Amoxicillin-Pot Clavulanate] Other and Nausea/vomiting    Bee Venom Protein (Honey Bee) Swelling    Pneumococcal 23-Katelyn Ps Vaccine Other and Hives    Venom-Wasp Swelling    Venom-Wasp Protein Swelling

## 2025-08-06 ENCOUNTER — PRE-ADMISSION TESTING (OUTPATIENT)
Dept: PREADMISSION TESTING | Facility: HOSPITAL | Age: 75
End: 2025-08-06
Payer: MEDICARE

## 2025-08-06 VITALS
DIASTOLIC BLOOD PRESSURE: 87 MMHG | WEIGHT: 185.3 LBS | BODY MASS INDEX: 31.63 KG/M2 | HEART RATE: 94 BPM | TEMPERATURE: 98 F | SYSTOLIC BLOOD PRESSURE: 151 MMHG | OXYGEN SATURATION: 97 % | HEIGHT: 64 IN

## 2025-08-06 DIAGNOSIS — Z01.818 PREOPERATIVE EXAMINATION: Primary | ICD-10-CM

## 2025-08-06 DIAGNOSIS — E11.9 TYPE 2 DIABETES MELLITUS WITHOUT COMPLICATION, WITHOUT LONG-TERM CURRENT USE OF INSULIN: ICD-10-CM

## 2025-08-06 DIAGNOSIS — R22.1 NECK MASS: ICD-10-CM

## 2025-08-06 LAB
ABO GROUP (TYPE) IN BLOOD: NORMAL
ANION GAP SERPL CALC-SCNC: 16 MMOL/L (ref 10–20)
ANTIBODY SCREEN: NORMAL
BASOPHILS # BLD AUTO: 0.06 X10*3/UL (ref 0–0.1)
BASOPHILS NFR BLD AUTO: 0.8 %
BUN SERPL-MCNC: 22 MG/DL (ref 6–23)
CALCIUM SERPL-MCNC: 10.8 MG/DL (ref 8.6–10.6)
CHLORIDE SERPL-SCNC: 103 MMOL/L (ref 98–107)
CO2 SERPL-SCNC: 22 MMOL/L (ref 21–32)
CREAT SERPL-MCNC: 0.95 MG/DL (ref 0.5–1.05)
EGFRCR SERPLBLD CKD-EPI 2021: 63 ML/MIN/1.73M*2
EOSINOPHIL # BLD AUTO: 0.15 X10*3/UL (ref 0–0.4)
EOSINOPHIL NFR BLD AUTO: 1.9 %
ERYTHROCYTE [DISTWIDTH] IN BLOOD BY AUTOMATED COUNT: 13.7 % (ref 11.5–14.5)
EST. AVERAGE GLUCOSE BLD GHB EST-MCNC: 111 MG/DL
GLUCOSE SERPL-MCNC: 103 MG/DL (ref 74–99)
HBA1C MFR BLD: 5.5 % (ref ?–5.7)
HCT VFR BLD AUTO: 37.3 % (ref 36–46)
HGB BLD-MCNC: 11.7 G/DL (ref 12–16)
IMM GRANULOCYTES # BLD AUTO: 0.03 X10*3/UL (ref 0–0.5)
IMM GRANULOCYTES NFR BLD AUTO: 0.4 % (ref 0–0.9)
LYMPHOCYTES # BLD AUTO: 1.98 X10*3/UL (ref 0.8–3)
LYMPHOCYTES NFR BLD AUTO: 25.3 %
MCH RBC QN AUTO: 30.6 PG (ref 26–34)
MCHC RBC AUTO-ENTMCNC: 31.4 G/DL (ref 32–36)
MCV RBC AUTO: 98 FL (ref 80–100)
MONOCYTES # BLD AUTO: 0.43 X10*3/UL (ref 0.05–0.8)
MONOCYTES NFR BLD AUTO: 5.5 %
NEUTROPHILS # BLD AUTO: 5.17 X10*3/UL (ref 1.6–5.5)
NEUTROPHILS NFR BLD AUTO: 66.1 %
NRBC BLD-RTO: 0 /100 WBCS (ref 0–0)
PLATELET # BLD AUTO: 337 X10*3/UL (ref 150–450)
POTASSIUM SERPL-SCNC: 4.3 MMOL/L (ref 3.5–5.3)
RBC # BLD AUTO: 3.82 X10*6/UL (ref 4–5.2)
RH FACTOR (ANTIGEN D): NORMAL
SODIUM SERPL-SCNC: 137 MMOL/L (ref 136–145)
WBC # BLD AUTO: 7.8 X10*3/UL (ref 4.4–11.3)

## 2025-08-06 PROCEDURE — 36415 COLL VENOUS BLD VENIPUNCTURE: CPT

## 2025-08-06 PROCEDURE — 83036 HEMOGLOBIN GLYCOSYLATED A1C: CPT

## 2025-08-06 PROCEDURE — 80048 BASIC METABOLIC PNL TOTAL CA: CPT

## 2025-08-06 PROCEDURE — 99205 OFFICE O/P NEW HI 60 MIN: CPT | Performed by: FAMILY MEDICINE

## 2025-08-06 PROCEDURE — 87081 CULTURE SCREEN ONLY: CPT

## 2025-08-06 PROCEDURE — 85025 COMPLETE CBC W/AUTO DIFF WBC: CPT

## 2025-08-06 PROCEDURE — 86901 BLOOD TYPING SEROLOGIC RH(D): CPT

## 2025-08-06 RX ORDER — CHLORHEXIDINE GLUCONATE ORAL RINSE 1.2 MG/ML
SOLUTION DENTAL
Qty: 15 ML | Refills: 0 | Status: SHIPPED | OUTPATIENT
Start: 2025-08-06

## 2025-08-06 RX ORDER — CHLORHEXIDINE GLUCONATE 40 MG/ML
SOLUTION TOPICAL
Qty: 473 ML | Refills: 0 | Status: SHIPPED | OUTPATIENT
Start: 2025-08-06

## 2025-08-06 ASSESSMENT — DUKE ACTIVITY SCORE INDEX (DASI)
CAN YOU PARTICIPATE IN STRENOUS SPORTS LIKE SWIMMING, SINGLES TENNIS, FOOTBALL, BASKETBALL, OR SKIING: NO
CAN YOU WALK INDOORS, SUCH AS AROUND YOUR HOUSE: YES
CAN YOU DO YARD WORK LIKE RAKING LEAVES, WEEDING OR PUSHING A MOWER: NO
CAN YOU PARTICIPATE IN MODERATE RECREATIONAL ACTIVITIES LIKE GOLF, BOWLING, DANCING, DOUBLES TENNIS OR THROWING A BASEBALL OR FOOTBALL: NO
CAN YOU CLIMB A FLIGHT OF STAIRS OR WALK UP A HILL: NO
CAN YOU HAVE SEXUAL RELATIONS: YES
DASI METS SCORE: 6
CAN YOU DO HEAVY WORK AROUND THE HOUSE LIKE SCRUBBING FLOORS OR LIFTING AND MOVING HEAVY FURNITURE: YES
TOTAL_SCORE: 26.7
CAN YOU DO LIGHT WORK AROUND THE HOUSE LIKE DUSTING OR WASHING DISHES: YES
CAN YOU RUN A SHORT DISTANCE: NO
CAN YOU DO MODERATE WORK AROUND THE HOUSE LIKE VACUUMING, SWEEPING FLOORS OR CARRYING GROCERIES: YES
CAN YOU WALK A BLOCK OR TWO ON LEVEL GROUND: YES
CAN YOU TAKE CARE OF YOURSELF (EAT, DRESS, BATHE, OR USE TOILET): YES

## 2025-08-06 ASSESSMENT — ENCOUNTER SYMPTOMS
ENDOCRINE NEGATIVE: 1
NECK NEGATIVE: 1
CONSTITUTIONAL NEGATIVE: 1
BRUISES/BLEEDS EASILY: 1
GASTROINTESTINAL NEGATIVE: 1
CARDIOVASCULAR NEGATIVE: 1
EYES NEGATIVE: 1
MUSCULOSKELETAL NEGATIVE: 1
NEUROLOGICAL NEGATIVE: 1
RESPIRATORY NEGATIVE: 1

## 2025-08-06 ASSESSMENT — LIFESTYLE VARIABLES: SMOKING_STATUS: NONSMOKER

## 2025-08-06 NOTE — NURSING NOTE
Patient seen in PAT. Orders reviewed with PAT provider.    Following labs obtained by documenting RN:  CBC, T/S, BMP, MRSA.         Patient discharged with: Pre-procedure instructions/AVS.        Sarah ALBERTON, RN  [facility]& Pre-Admission Testing  [facility]

## 2025-08-06 NOTE — PREPROCEDURE INSTRUCTIONS
Thank you for visiting [facility] ([facility]) today for your pre-procedure evaluation, you were seen by     FA[facility] [place]  Department of Anesthesiology and Perioperative Medicine  Main phone [phone]  Fax [phone]    This summary includes instructions and information to aid you during your perioperative period.  Please read carefully. If you have any questions about your visit today, please call the number listed above.  If you become ill or have any changes to your health before your surgery, please contact your primary care provider and alert your surgeon.    General Medications Instructions (see back for further medication instructions)  Hold all vitamins and supplements 7 days prior to surgery  Tylenol okay to continue, please HOLD Aleve/naproxen/ibuprofen (NSAIDs) for 7 days prior to surgery  No lotion/moisturizers or Deoderant after last shower on day of surgery  [facility] will call you the business day prior to your surgery between 3-5 PM and let you know what time to present to the hospital before your surgery (For Monday surgeries, they will call on Friday afternoon)    You will be called business day prior to surgery to confirm arrival time.     Preparing for Surgery       Preoperative Fasting Guidelines  Why must I stop eating and drinking near surgery time?  With sedation, food or liquid in your stomach can enter your lungs causing serious complications  Food can increase nausea and vomiting  When do I need to stop eating and drinking before my surgery?  Do not eat any food after midnight the night before your surgery/procedure.  You may have up to 13.5 ounces of clear liquid until TWO hours before your instructed arrival time to the hospital.  This includes water, black tea/coffee, (no milk or cream) apple juice, and electrolyte drinks (Gatorade)  You may chew gum until TWO hours before your surgery/procedure         Tobacco and Alcohol;  Do not drink alcohol  or smoke within 24 hours of surgery.  It is best to quit smoking for as long as possible before any surgery or procedure.        Patient Information: Pre-Operative Infection Prevention Measures     Why did I have my nose, under my arms and groin swabbed?  The purpose of the swab is to identify Staphylococcus aureus inside your nose or on your skin.  The swab was sent to the laboratory for culture.  A positive swab/culture for Staphylococcus aureus is called colonization or carriage.      What is Staphylococcus aureus?  Staphylococcus aureus, also known as “staph”, is a germ found on the skin or in the nose of healthy people.  Sometimes Staphylococcus aureus can get into the body and cause an infection.  This can be minor (such as pimples, boils or other skin problems).  It might also be serious (such as blood infection, pneumonia or a surgical site infection).    What is Staphylococcus aureus colonization or carriage?  Colonization or carriage means that a person has the germ but is not sick from it.  These bacteria can be spread on the hands or when breathing or sneezing.    How is Staphylococcus aureus spread?  It is most often spread by close contact with a person or item that carries it.    What happens if my culture is positive for Staphylococcus aureus?  Your doctor/medical team will use this information to guide any antibiotic treatment which may be necessary.  Regardless of the culture results, we will clean the inside of your nose with a betadine swab just before you have your surgery.      Will I get an infection if I have Staphylococcus aureus in my nose or on my skin?  Anyone can get an infection with Staphylococcus aureus.  However, the best way to reduce your risk of infection is to follow the instructions provided to you for the use of your CHG soap and dental rinse.          Home Preoperative Antibacterial Shower     What is a home preoperative antibacterial shower?  This shower is a way of cleaning  the skin with a germ killing soap before surgery.  The soap contains chlorhexidine, commonly known as CHG.  CHG is a soap for your skin with germ killing ability.  Let your doctor know if you are allergic to chlorhexidine.    Why do I need to take a preoperative antibacterial shower?  Skin is not sterile.  It is best to try to make your skin as free of germs as possible before surgery.  Proper cleansing with a germ killing soap before surgery can lower the number of germs on your skin.  This helps to reduce the risk of infection at the surgical site.  Following the instructions listed below will help you prepare your skin for surgery.      How do I use the CHG skin cleanser?  Steps:  Begin using your CHG soap five days before your scheduled surgery on ________________________.    Days 1-4 Shower before bed:  Wash your face and genitals with your normal soap and rinse.           2.    Apply the CHG soap to a clean wet washcloth.  Turn the water off or move away                From the water spray to avoid premature rinsing of the CHG soap as you are applying.     3.   Lather your entire body from the neck down.  Do not use on your face or genitals.  4. Pay special attention to the area(s) where your incision(s) will be located unless they are on your face.  Avoid scrubbing your skin too hard.  The important point is to have the CHG soap sit on your skin for 3 minutes.    When the 3 minutes are up, turn on the water and rinse the CHG soap off your body completely.   Pat yourself dry with a clean, freshly-laundered towel.  Dress in clean, freshly laundered night clothes.    Be sure to change bed sheets and blankets at least on the first night of CHG body wash use. May change linens every night of the above protocol for maximum benefit.   Day 5:  Last shower is the morning of surgery: Follow above Instructions.    NOTE:        *Keep CHG soap out of eyes and ear canals   *DO NOT wash with regular soap on your body after  you have used the CHG soap solution  *DO NOT apply powders, lotions, or perfume.  *Deodorant may be used days 1-4, BUT NOT the day of surgery      Patient Information: Oral/Dental Rinse  **This is a prescription; pick it up at your preferred local pharmacy **  What is oral/dental rinse?   It is a mouthwash. It is a way of cleaning the mouth with a germ killing solution before your surgery.  The solution contains chlorhexidine, commonly known as CHG.   It is used inside the mouth to kill a bacteria known as Staphylococcus aureus.  Let your doctor know if you are allergic to Chlorhexidine.    Why do I need to use CHG oral/dental rinse?  The CHG oral/dental rinse helps to kill a bacteria in your mouth known a Staphylococcus aureus.     This reduces the risk of infection at the surgical site.      Using your CHG oral/dental rinse  STEPS:  Use your CHG oral/dental rinse after you brush your teeth the night before (at bedtime) and the morning of your surgery.  Follow all directions on your prescription label.    Use the cap on the container to measure 15ml (fill cap to fill line)  Swish (gargle if you can) the mouthwash in your mouth for at least 30 seconds, (do not to swallow) spit out  After you use your CHG rinse, do not rinse your mouth with water, drink or eat.  Please refer to prescription label for the appropriate time to resume oral intake  Dental rinse comes in one size bottle: 473ml ~16oz.  You will have leftover    rinse, discard after this use.    What side effects might I have using the CHG oral/dental rinse?  CHG rinse will stick to plaque on the teeth.  Brush and floss just before use.  Teeth brushing will help avoid staining of plaque during use.           The Week before Surgery        Seven days before Surgery  Check your [facility] medication instructions  Do the exercises provided to you by [facility]   Arrange for a responsible, adult licensed  to take you home after surgery and stay with you for 24 hours.   You will not be permitted to drive yourself home if you have received any anesthetic/sedation  Five days before surgery  Check your [facility] medication instructions  Do the exercises provided to you by [facility]   Start using Chlorhexidene (CHG) body wash if prescribed (Continue till day of surgery)      The Day before Surgery       Check your [facility] medication and all other [facility] instructions including when to stop eating and drinking  You will be called with your arrival time for surgery in the late afternoon.  If you do not receive a call please reach out to your surgeon's office.  Do not smoke or drink 24 hours before surgery  Prepare items to bring with you to the hospital  Shower with your chlorhexidine wash if prescribed  Brush your teeth and use your chlorhexidine dental rinse if prescribed    The Day of Surgery       Check your [facility] medication instructions  Ensure you follow the instructions for when to stop eating and drinking  Shower, if prescribed use CHG.  Do not apply any lotions, creams, moisturizers, perfume or deodorant  Brush your teeth and use your CHG dental rinse if prescribed  Wear loose comfortable clothing  Avoid make-up  Remove  jewelry and piercings, consider professional piercing removal with a plastic spacer if needed  Bring photo ID and Insurance card  Bring an accurate medication list that includes medication dose, frequency and allergies  Bring a copy of your advanced directives (will, health care power of )  Bring any devices and controllers as well as medical devices you have been provided with for surgery (CPAP, slings, braces, etc.)  Dentures, eyeglasses, and contacts will be removed before surgery, please bring cases for contacts or glasses    Preoperative Deep Breathing Exercises    Why it is important to do deep breathing exercises before my surgery?  Deep breathing exercises strengthen your breathing muscles.  This helps you to recover after your surgery and decreases the chance of  breathing complications.    How are the deep breathing exercises done?  Sit straight with your back supported.  Breathe in deeply and slowly through your nose. Your lower rib cage should expand and your abdomen may move forward.  Hold that breath for 3 to 5 seconds.  Breathe out through pursed lips, slowly and completely.  Rest and repeat 10 times every hour while awake.  Rest longer if you become dizzy or lightheaded.       Preoperative Brain Exercises    What are brain exercises?  A brain exercise is any activity that engages your thinking (cognitive) skills.    What types of activities are considered brain exercises?  Jigsaw puzzles, crossword puzzles, word jumble, memory games, word search, and many more.  Many can be found free online or on your phone via a mobile jabier.    Why should I do brain exercises before my surgery?  More recent research has shown brain exercise before surgery can lower the risk of postoperative delirium (confusion) which can be especially important for older adults.  Patients who did brain exercises for 5 to 10 hours the days before surgery, cut their risk of postoperative delirium in half up to 1 week after surgery.    Sit-to-Stand Exercise    What is the sit-to-stand exercise?  The sit-to-stand exercise strengthens the muscles of your lower body and muscles in the center of your body (core muscles for stability) helping to maintain and improve your strength and mobility.  How do I do the sit-to-stand exercise?  The goal is to do this exercise without using your arms or hands.  If this is too difficult, use your arms and hands or a chair with armrests to help slowly push yourself to the standing position and lower yourself back to the sitting position. As the movement becomes easier use your arms and hands less.    Steps to the sit-to-stand exercise  Sit up tall in a sturdy chair, knees bent, feet flat on the floor shoulder-width apart.  Shift your hips/pelvis forward in the chair to  correctly position yourself for the next movement.  Lean forward at your hips.  Stand up straight putting equal weight on both feet.  Check to be sure you are properly aligned with the chair, in a slow controlled movement sit back down.  Repeat this exercise 10-15 times.  If needed you can do it fewer times until your strength improves.  Rest for 1 minute.  Do another 10-15 sit-to-stand exercises.  Try to do this in the morning and evening.       Simple things you can do to help prevent blood clots     Blood clots are blockages that can form in the body's veins. When a blood clot forms in your deep veins, it may be called a deep vein thrombosis, or DVT for short. Blood clots can happen in any part of the body where blood flows, but they are most common in the arms and legs. If a piece of a blood clot breaks free and travels to the lungs, it is called a pulmonary embolus (PE). A PE can be a very serious problem.      Being in the hospital or having surgery can raise your chances of getting a blood clot because you may not be well enough to move around as much as you normally do.         Ways you can help prevent blood clots in the hospital       Wearing SCDs  SCDs stands for Sequential Compression Devices.   SCDs are special sleeves that wrap around your legs. They attach to a pump that fills them with air to gently squeeze your legs every few minutes.  This helps return the blood in your legs to your heart.   SCDs should only be taken off when walking or bathing. SCDs may not be comfortable, but they can help save your life.              Pump SCD leg sleeves  Wearing compression stockings - if your doctor orders them. These special snug-fitting stockings gently squeeze your legs to help blood flow.       Walking. Walking helps move the blood in your legs.   If your doctor says it is ok, try walking the halls at least   5 times a day. Ask us to help you get up, so you don't fall.      Taking any blood-thinning  medicines your doctor orders.              Ways you can help prevent blood clots at home         Wearing compression stockings - if your doctor orders them.   Walking - to help move the blood in your legs.    Taking any blood-thinning medicines your doctor orders.      Signs of a blood clot or PE    Tell your doctor or nurse right away if you have any of the problems listed below.         If you are at home, seek medical care right away. Call 911 for chest pain or problems breathing.            Signs of a blood clot (DVT) - such as pain, swelling, redness, or warmth in your arm or legs.  Signs of a pulmonary embolism (PE) - such as chest pain or feeling short of breath

## 2025-08-06 NOTE — CPM/PAT H&P
CPM/PAT Evaluation       Name: Latosha Larson (Latosha Larson)  /Age: 1950/75 y.o.     Visit Type:   In-Person       Chief Complaint: Perioperative visit      76 y/o female scheduled for EXCISION, LESION, NECK - Left, PARATHYROIDECTOMY - Left, LOBECTOMY, THYROID - Left, DISSECTION, NECK - Left, EXPLORATION, BLOOD VESSEL, HEAD AND NECK REGION - Left, CREATION, BYPASS, ARTERIAL, CAROTID, USING GRAFT - Left  on 25 secondary to Neck mass with Dr. Charan Keyes and Wesley Boyd who referred to CPM.  Presents to [facility] today for perioperative risk stratification and optimization. PMHX includes arthritis, allergic rhinitis, bladder CA s/p surgery, cardiomegaly, on anticoagulation, chronic pain, obesity, depression, h/o DVT, fibromyalgia, hashimoto disease, HLD, HTN, hypothyroid, hyperparathyroid, IBS, lung nodules, May-thurner syndrome, OA, osteopenia, osteoporosis, PE, DM2 and hearing loss.      Medical History[1]    Surgical History[2]    Patient Sexual activity questions deferred to the physician.    Family History[3]    Allergies[4]    Prior to Admission medications   Medication Sig Start Date End Date Taking? Authorizing Provider   betamethasone dipropionate (Diprosone) 0.05 % lotion once daily. 24   Historical Provider, MD   blood sugar diagnostic strip 1 each 3 times a day. 24   Dipti Aleman, APRN-CNP   cholecalciferol (Vitamin D-3) 125 MCG (5000 UT) capsule Take 1 capsule (125 mcg) by mouth once daily.    Historical Provider, MD   cyanocobalamin (Vitamin B-12) 100 mcg tablet Take 1 tablet (100 mcg) by mouth once daily.    Historical Provider, MD   diphenhydrAMINE (Sominex) 25 mg tablet Take 2 tablets (50 mg) by mouth as needed at bedtime for sleep.  Patient not taking: Reported on 2025    Historical Provider, MD Johansenquis 5 mg tablet Take 1 tablet (5 mg) by mouth 2 times a day. 25   Rosetta Hensley MD   levothyroxine (Synthroid, Levoxyl) 100 mcg tablet Take 1 tablet (100 mcg) by mouth  early in the morning.. Take on an empty stomach at the same time each day, either 30 to 60 minutes prior to breakfast 12/13/24 12/13/25  Willy Lord MD   metFORMIN (Glucophage) 500 mg tablet Take 2 tablets (1,000 mg) by mouth 2 times daily (morning and late afternoon). Do not fill before January 22, 2025. 1/22/25   FAITH Ruffin   omega 3-dha-epa-fish oil (Fish OiL) 1,200 (144-216) mg capsule Take 1 capsule (1,200 mg) by mouth once daily.    Historical Provider, MD   pantoprazole (ProtoNix) 40 mg EC tablet Take 1 tablet (40 mg) by mouth once daily in the morning. Take before meals. 10/19/21   Historical Provider, MD   phenazopyridine (Pyridium) 100 mg tablet Take 1 tablet (100 mg) by mouth 3 times a day as needed for bladder spasms.  Patient not taking: Reported on 7/9/2025 1/21/25   FAITH Ruffin   rosuvastatin (Crestor) 20 mg tablet Take 1 tablet (20 mg) by mouth once daily. 11/18/24   FAITH Loaiza   traMADol (Ultram) 50 mg tablet Take 1 tablet (50 mg) by mouth every 8 hours if needed for severe pain (7 - 10).  Patient not taking: Reported on 7/9/2025 1/21/25   FAITH Ruffin        PAT ROS:   Constitutional:   neg    Neuro/Psych:   neg    Eyes:   neg     use of corrective lenses  Ears:    hearing loss   hearing aides  Nose:   neg    Mouth:   neg    Throat:   neg    Neck:   neg    Cardio:   neg    Respiratory:   neg    Endocrine:   neg    GI:   neg    :   neg    Musculoskeletal:   neg    Hematologic:   neg     bruises/bleeds easily   history of blood transfusion  Skin:  neg        Physical Exam  Vitals reviewed. Physical exam within normal limits.   Constitutional:       General: She is not in acute distress.     Appearance: Normal appearance. She is normal weight. She is not ill-appearing.   HENT:      Head: Normocephalic.      Nose: Nose normal.      Mouth/Throat:      Mouth: Mucous membranes are moist.     Cardiovascular:      Rate and Rhythm: Normal rate and  "regular rhythm.      Heart sounds: Normal heart sounds. No murmur heard.  Pulmonary:      Effort: Pulmonary effort is normal. No respiratory distress.      Breath sounds: Normal breath sounds. No wheezing.   Abdominal:      General: There is no distension.      Tenderness: There is no abdominal tenderness. There is no guarding.     Musculoskeletal:         General: Normal range of motion.      Cervical back: Normal range of motion and neck supple. No rigidity or tenderness.      Right lower leg: No edema.      Left lower leg: No edema.     Skin:     General: Skin is warm and dry.     Neurological:      General: No focal deficit present.      Mental Status: She is alert and oriented to person, place, and time. Mental status is at baseline.     Psychiatric:         Mood and Affect: Mood normal.          PAT AIRWAY:   Airway:     Mallampati::  III    TM distance::  >3 FB    Neck ROM::  Full  normal            Visit Vitals  /87   Pulse 94   Temp 36.7 °C (98 °F) (Temporal)   Ht 1.626 m (5' 4\")   Wt 84.1 kg (185 lb 4.8 oz)   LMP  (LMP Unknown)   SpO2 97%   BMI 31.81 kg/m²   OB Status Postmenopausal   Smoking Status Former   BSA 1.95 m²       DASI Risk Score      Flowsheet Row Pre-Admission Testing from 8/6/2025 in [facility] Clinical Support from 7/28/2025 in [facility]   Can you take care of yourself (eat, dress, bathe, or use toilet)?  2.75 filed at 08/06/2025 1354 2.75 filed at 07/28/2025 1139   Can you walk indoors, such as around your house? 1.75 filed at 08/06/2025 1354 1.75 filed at 07/28/2025 1139   Can you walk a block or two on level ground?  2.75 filed at 08/06/2025 1354 2.75 filed at 07/28/2025 1139   Can you climb a flight of stairs or walk up a hill? 0 filed at 08/06/2025 1354 5.5 filed at 07/28/2025 1139   Can you run a short distance? 0 filed at 08/06/2025 1354 0 filed at 07/28/2025 1139   Can you do light work around the house like dusting or washing dishes? 2.7 " filed at 08/06/2025 1354 2.7 filed at 07/28/2025 1139   Can you do moderate work around the house like vacuuming, sweeping floors or carrying groceries? 3.5 filed at 08/06/2025 1354 3.5 filed at 07/28/2025 1139   Can you do heavy work around the house like scrubbing floors or lifting and moving heavy furniture?  8 filed at 08/06/2025 1354 8 filed at 07/28/2025 1139   Can you do yard work like raking leaves, weeding or pushing a mower? 0 filed at 08/06/2025 1354 0 filed at 07/28/2025 1139   Can you have sexual relations? 5.25 filed at 08/06/2025 1354 5.25 filed at 07/28/2025 1139   Can you participate in moderate recreational activities like golf, bowling, dancing, doubles tennis or throwing a baseball or football? 0 filed at 08/06/2025 1354 0 filed at 07/28/2025 1139   Can you participate in strenous sports like swimming, singles tennis, football, basketball, or skiing? 0 filed at 08/06/2025 1354 0 filed at 07/28/2025 1139   DASI SCORE 26.7 filed at 08/06/2025 1354 32.2 filed at 07/28/2025 1139   METS Score (Will be calculated only when all the questions are answered) 6 filed at 08/06/2025 1354 6.7 filed at 07/28/2025 1139     [place] DVT Assessment      Flowsheet Row Pre-Admission Testing from 8/6/2025 in [facility] Pre-Admission Testing from 1/14/2025 in [facility] [place]   DVT Score (IF A SCORE IS NOT CALCULATING, MUST SELECT A BMI TO COMPLETE) 10 filed at 08/06/2025 1442 9 filed at 01/14/2025 1132   Medical Factors -- History of DVT/PE filed at 01/14/2025 1132   Surgical Factors Major surgery planned, lasting over 3 hours filed at 08/06/2025 1442 Minor surgery planned filed at 01/14/2025 1132   BMI (BMI MUST BE CHOSEN) 31-40 (Obesity) filed at 08/06/2025 1442 31-40 (Obesity) filed at 01/14/2025 1132     Modified Frailty Index      Flowsheet Row Pre-Admission Testing from 8/6/2025 in [facility]   Non-independent functional status (problems with dressing, bathing, personal  grooming, or cooking) 0 filed at 08/06/2025 1442   History of diabetes mellitus  0.0909 filed at 08/06/2025 1442   History of COPD 0 filed at 08/06/2025 1442   History of CHF No filed at 08/06/2025 1442   History of MI 0 filed at 08/06/2025 1442   History of Percutaneous Coronary Intervention, Cardiac Surgery, or Angina No filed at 08/06/2025 1442   Hypertension requiring the use of medication  0.0909 filed at 08/06/2025 1442   Peripheral vascular disease 0 filed at 08/06/2025 1442   Impaired sensorium (cognitive impairement or loss, clouding, or delirium) 0 filed at 08/06/2025 1442   TIA or CVA withouy residual deficit 0 filed at 08/06/2025 1442   Cerebrovascular accident with deficit 0 filed at 08/06/2025 1442   Modified Frailty Index Calculator .1818 filed at 08/06/2025 1442     [id number] Stroke Risk Points  Current as of 15 minutes ago        N/A 0 to 9 Points:      Last Change: N/A          The [id number] risk score (Lip MATILDE, et al. 2009. © 2010 [employer]) quantifies the risk of stroke for a patient with atrial fibrillation. For patients without atrial fibrillation or under the age of 18 this score appears as N/A. Higher score values generally indicate higher risk of stroke.        This score is not applicable to this patient. Components are not calculated.          Revised Cardiac Risk Index      Flowsheet Row Pre-Admission Testing from 8/6/2025 in [facility] Pre-Admission Testing from 3/27/2025 in [facility]   High-Risk Surgery (Intraperitoneal, Intrathoracic,Suprainguinal vascular) 1 filed at 08/06/2025 1442 1 filed at 03/27/2025 1416   History of ischemic heart disease (History of MI, History of positive exercuse test, Current chest paint considered due to myocardial ischemia, Use of nitrate therapy, ECG with pathological Q Waves) 0 filed at 08/06/2025 1442 0 filed at 03/27/2025 1416   History of congestive heart failure (pulmonary edemia,  bilateral rales or S3 gallop, Paroxysmal nocturnal dyspnea, CXR showing pulmonary vascular redistribution) 0 filed at 08/06/2025 1442 0 filed at 03/27/2025 1416   History of cerebrovascular disease (Prior TIA or stroke) 0 filed at 08/06/2025 1442 0 filed at 03/27/2025 1416   Pre-operative insulin treatment 0 filed at 08/06/2025 1442 0 filed at 03/27/2025 1416   Pre-operative creatinine>2 mg/dl 0 filed at 08/06/2025 1442 0 filed at 03/27/2025 1416   Revised Cardiac Risk Calculator 1 filed at 08/06/2025 1442 1 filed at 03/27/2025 1416     Apfel Simplified Score      Flowsheet Row Pre-Admission Testing from 8/6/2025 in [facility] Pre-Admission Testing from 2/27/2024 in [facility]   Smoking status 1 filed at 08/06/2025 1442 1 filed at 02/27/2024 1356   History of motion sickness or PONV  0 filed at 08/06/2025 1442 0 filed at 02/27/2024 1356   Use of postoperative opioids 1 filed at 08/06/2025 1442 1 filed at 02/27/2024 1356   Gender - Female 1=Yes filed at 08/06/2025 1442 --   Apfel Simplified Score Calculator 3 filed at 08/06/2025 1442 3 filed at 02/27/2024 1356     Risk Analysis Index Results This Encounter    No data found in the last 10 encounters.       Stop Bang Score      Flowsheet Row Pre-Admission Testing from 8/6/2025 in [facility] Admission (Discharged) from 4/10/2025 in [facility] OR with Eric Rojas MD   Do you snore loudly? 0 filed at 08/06/2025 1354 0 filed at 04/10/2025 0949   Do you often feel tired or fatigued after your sleep? 1 filed at 08/06/2025 1354 1 filed at 04/10/2025 0949   Has anyone ever observed you stop breathing in your sleep? 0 filed at 08/06/2025 1354 0 filed at 04/10/2025 0949   Do you have or are you being treated for high blood pressure? 0 filed at 08/06/2025 1354 0 filed at 04/10/2025 0949   Recent BMI (Calculated) 32.3 filed at 08/06/2025 1354 32.3 filed at 04/10/2025 0949   Is BMI greater than 35 kg/m2? 0=No filed  at 08/06/2025 1354 0=No filed at 04/10/2025 0949   Age older than 50 years old? 1=Yes filed at 08/06/2025 1354 1=Yes filed at 04/10/2025 0949   Is your neck circumference greater than 17 inches (Male) or 16 inches (Female)? 0 filed at 08/06/2025 1354 0 filed at 04/10/2025 0949   Gender - Male 0=No filed at 08/06/2025 1354 0=No filed at 04/10/2025 0949   STOP-BANG Total Score 2 filed at 08/06/2025 1354 2 filed at 04/10/2025 0949     Prodigy: High Risk  Total Score: 12              Prodigy Age Score           ARISCAT Score for Postoperative Pulmonary Complications      Flowsheet Row Pre-Admission Testing from 8/6/2025 in [facility] Pre-Admission Testing from 1/14/2025 in [facility]   Age Calculated Score 3 filed at 08/06/2025 1443 3 filed at 01/14/2025 1146   Preoperative SpO2 0 filed at 08/06/2025 1443 0 filed at 01/14/2025 1146   Respiratory infection in the last month Either upper or lower (i.e., URI, bronchitis, pneumonia), with fever and antibiotic treatment 0 filed at 08/06/2025 1443 0 filed at 01/14/2025 1146   Preoperative anemia (Hgb less than 10 g/dl) 0 filed at 08/06/2025 1443 0 filed at 01/14/2025 1146   Surgical incision  0 filed at 08/06/2025 1443 0 filed at 01/14/2025 1146   Duration of surgery  23 filed at 08/06/2025 1443 0 filed at 01/14/2025 1146   Emergency Procedure  0 filed at 08/06/2025 1443 0 filed at 01/14/2025 1146   ARISCAT Total Score  26 filed at 08/06/2025 1443 3 filed at 01/14/2025 1146     Ricci Perioperative Risk for Myocardial Infarction or Cardiac Arrest (MIC)      Flowsheet Row Pre-Admission Testing from 8/6/2025 in [facility] Pre-Admission Testing from 1/14/2025 in [facility]   Calculated Age Score 1.48 filed at 08/06/2025 1443 1.46 filed at 01/14/2025 1146   Functional Status  0 filed at 08/06/2025 1443 0 filed at 01/14/2025 1146   ASA Class  -3.29 filed at 08/06/2025 1443 -3.29 filed at 01/14/2025 1146   Creatinine 0  filed at 08/06/2025 1443 0 filed at 01/14/2025 1146   Type of Procedure  0.18 filed at 08/06/2025 1443 -0.26 filed at 01/14/2025 1146   MIC Total Score  -6.88 filed at 08/06/2025 1443 -7.34 filed at 01/14/2025 1146   MIC % 0.1 filed at 08/06/2025 1443 0.06 filed at 01/14/2025 1146           Patient Specialist/PCP:   Vascular Surgery   7/9/25-Wesley Boyd MD  PCP- Dipti Aleman APRN-GLENDY  Endocrinology -Willy Lord MD(fellow)   Vascular Medicine - Anupama Frost MD   Otolaryngology -Charan Keyes MD  Urology - Stefany Phipps MD    Assessment and Plan:     Anesthesia  The patient denies problems with anesthesia in the past such as PONV, prolonged sedation, awareness, dental damage, aspiration, cardiac arrest, difficult intubation, or unexpected hospital admissions.     Airway  No documented or reported history of airway difficulty.     Neurology  #Depression    The patient has no neurological diagnoses or significant findings on chart review, clinical presentation, and evaluation. No grossly apparent neurological perioperative risk.    HEENT  The patient has Neck mass    Otolaryngology -Charan Keyes MD    Preop exam for   EXCISION, LESION, NECK - Left  PARATHYROIDECTOMY - Left  LOBECTOMY, THYROID - Left  DISSECTION, NECK - Left  EXPLORATION, BLOOD VESSEL, HEAD AND NECK REGION - Left  CREATION, BYPASS, ARTERIAL, CAROTID, USING GRAFT - Left     Cardiovascular  #HLD  - Medicated on rosuvastatin (continue)    ECG; 6/6/25  Normal sinus rhythm  Possible Anterior infarct , age undetermined  Abnormal ECG    ECHO; 11/16/2020  Exam quality: fair  - The left ventricular chamber size is normal.  - There is normal left ventricular systolic function. Estimated ejection fraction is 55-59%.  - The right ventricular global systolic function is normal.  - Aortic valve is structurally normal. No evidence of regurgitation.  - Mitral valve is structurally normal. No evidence of regurgitation.  - Trivial tricuspid regurgitation.  -  "Unable to estimate the right ventricular systolic     Cardiology Evaluation  The patient is not followed by cardiology.    METS  The patient's functional capacity is greater than 4 METS.  RCRI  The patient meets 1 RCRI criteria and therefore has a 6% risk of major adverse cardiac complications.  MIC score which indicates a 0.1% risk of intraoperative or 30-day postoperative MACE (major adverse cardiac event).    Patient is scheduled for non-cardiac surgery associated with elevated risk. The patient has no major cardiac contraindications to non- cardiac surgery.    Pulmonary  No significant findings on chart review or clinical presentation and evaluation.    STOP BANG 2, which places patient at low risk for having SARAHI.  ARISCAT 26, Intermediate, 13.3% risk of in-hospital postoperative pulmonary complications  PRODIGY high risk of respiratory depression episode.     Patient given PI sheet for preoperative deep breathing exercises.  Encourage  incentive spirometry in the postoperative period as deemed necessary.    Endocrine  #Left neck mass suspicious for primary hyperparathyroidism s/p attempted resection but lesion was noted to be adherent to the left common carotid artery and surgery was aborted  - Patient has seen Dr. Keyes with otolaryngology, who \"Discussed with patient reexploration with preparation for thyroidectomy as well as management of the carotid as indicated based on intraoperative findings with either patch or segmental resection at the discretion of vascular surgery\"    - Patient has seen Dr. Wesley Boyd with vascular surgery, she is scheduled for repeat attempt at resection with vascular help for carotid    - Per note, \"discussed options for reconstruction including primary repair vs vein patch vs interposition graft. Vein mapping ordered\"    #DM2 - last A1C 5.7 on 3/28/25  - Medicated on Metformin (hold 24 hr prior to surgery)     #Hypothyroid   - Medicated on levothyroxine (continue)   - " "Follows with endocrine     Gastrointestinal  #GERD  - Medicated on famotidine (continue) and pantoprazole (continue)     Eat 10- 0,  self-perceived oropharyngeal dysphagia scale (0-40)     Genitourinary  #Malignant neoplasm of anterior wall of urinary bladder  - Patient follows with urology Dr. Phipps, per note,  \"During a cystoscopy on 12/4/2024, the patient had a 2.5 cm papillary tumor on the dome s/p TURBT on 01/21/2025. She had a negative cystoscopy on 04/28/2025. She has previously underwent BCG treatments in early 2024.   - The patient underwent a cystoscopy today in office. The results are no recurrence. I have ordered a repeat cystoscopy in 3 months\"    Renal  No renal diagnoses or significant findings on chart review or clinical presentation and evaluation.    Hematology  #H/o left leg DVT on anticoagulation   - Medicated on Eliquis (hold)  - Per Dr. Frost hold Eliquis 36-48 hours prior (Patient is taking 2.5 mg bid but I prescribe 5 mg tabs) and resume depending on the surgeons' preference with respect to bleeding risk and such. If she's in the hospital then Lovenox prophylaxis until the time she can be put back on her Eliquis     Discussed with Dr Oliver tinoco to hold eliquis for 48 hours.    Pt called and notified to take last dose on 8-17.      #May-Thurner syndrome s/p stent complicated by retroperitoneal bleed. Had IVC filter, which was ultimately retrieved   - Follows with Vascular medicine, Dr. Evans Caprini score high risk of perioperative VTE.     Patient instructed to ambulate as soon as possible postoperatively to decrease thromboembolic risk. Initiate mechanical DVT prophylaxis as soon as possible and initiate chemical prophylaxis when deemed safe from a bleeding standpoint post surgery.     Transfusion Evaluation  A type and screen was obtained given the likelihood for perioperative transfusion of blood or blood products.    Musculoskeletal  #Degenerative joint disease, osteoporosis "     ID  No diagnoses or significant findings on chart review or clinical presentation and evaluation. MRSA screening obtained. Prescriptions and instructions given for Hibiclens and Peridex.    Diagnostic Results      - Vein Mapping Scheduled for 8/13    CT ANGIO CHEST FOR PULMONARY EMBOLISM; 6/6/2025   No acute pulmonary embolus to the segmental level.  Enlarged main pulmonary artery measures up to 3.7 cm. Findings may be seen with pulmonary arterial hypertension. Opacities along the dependent aspect of the lower lobes bilaterally as well as the lingula favored to represent atelectasis.    VASC US CAROTID ARTERY DUPLEX BILATERAL; 5/8/25  Right Carotid: The right proximal internal carotid artery is normal.   Left Carotid: Findings are consistent with less than 50% stenosis of the left proximal internal carotid artery. Left external carotid artery appears patent with no evidence of stenosis. There is a a non-vascularized, echogenic mass noted in the left side of the neck inferior to the proximal carotid artery measuring approximately 2.3 cm x 1.1 cm. The left vertebral artery is patent with antegrade flow. No evidence of hemodynamically significant stenosis in the left subclavian artery.    NM CT PARATHYROID SPECT; 1/27/2025   1. 6 mm nodule with mildly increased radiotracer uptake, lateral to the left thyroid lobe seen on SPECT CT, compatible with a biopsy-proven parathyroid adenoma.  2. Images saved into PACS for review      Recent Results (from the past 4 weeks)   Basic Metabolic Panel    Collection Time: 08/06/25  2:23 PM   Result Value Ref Range    Glucose 103 (H) 74 - 99 mg/dL    Sodium 137 136 - 145 mmol/L    Potassium 4.3 3.5 - 5.3 mmol/L    Chloride 103 98 - 107 mmol/L    Bicarbonate 22 21 - 32 mmol/L    Anion Gap 16 10 - 20 mmol/L    Urea Nitrogen 22 6 - 23 mg/dL    Creatinine 0.95 0.50 - 1.05 mg/dL    eGFR 63 >60 mL/min/1.73m*2    Calcium 10.8 (H) 8.6 - 10.6 mg/dL   Type And Screen Is this order related to  pregnancy or an upcoming surgery? Yes; Where will this surgery/delivery be performed? [facility]; What is the date of the surgery? 8/20/2025; Has this patient ever had a transfusion? Unknown; ...    Collection Time: 08/06/25  2:23 PM   Result Value Ref Range    ABO TYPE A     Rh TYPE POS     ANTIBODY SCREEN NEG    CBC and Auto Differential    Collection Time: 08/06/25  2:23 PM   Result Value Ref Range    WBC 7.8 4.4 - 11.3 x10*3/uL    nRBC 0.0 0.0 - 0.0 /100 WBCs    RBC 3.82 (L) 4.00 - 5.20 x10*6/uL    Hemoglobin 11.7 (L) 12.0 - 16.0 g/dL    Hematocrit 37.3 36.0 - 46.0 %    MCV 98 80 - 100 fL    MCH 30.6 26.0 - 34.0 pg    MCHC 31.4 (L) 32.0 - 36.0 g/dL    RDW 13.7 11.5 - 14.5 %    Platelets 337 150 - 450 x10*3/uL    Neutrophils % 66.1 40.0 - 80.0 %    Immature Granulocytes %, Automated 0.4 0.0 - 0.9 %    Lymphocytes % 25.3 13.0 - 44.0 %    Monocytes % 5.5 2.0 - 10.0 %    Eosinophils % 1.9 0.0 - 6.0 %    Basophils % 0.8 0.0 - 2.0 %    Neutrophils Absolute 5.17 1.60 - 5.50 x10*3/uL    Immature Granulocytes Absolute, Automated 0.03 0.00 - 0.50 x10*3/uL    Lymphocytes Absolute 1.98 0.80 - 3.00 x10*3/uL    Monocytes Absolute 0.43 0.05 - 0.80 x10*3/uL    Eosinophils Absolute 0.15 0.00 - 0.40 x10*3/uL    Basophils Absolute 0.06 0.00 - 0.10 x10*3/uL   Hemoglobin A1C    Collection Time: 08/06/25  2:23 PM   Result Value Ref Range    Hemoglobin A1C 5.5 See comment %    Estimated Average Glucose 111 Not Established mg/dL        -Preoperative medication instructions were provided and reviewed with the patient.  Any additional testing or evaluation was explained to the patient.  NPO Instructions were discussed, and the patient's questions were answered prior to conclusion of this encounter. Patient verbalized understanding of preoperative instructions. After Visit Summary given.                       [1]   Past Medical History:  Diagnosis Date    Allergic rhinitis 07/03/2023    Anemia     Arthritis 2015    knees     Bladder cancer (Multi)     s/p tumor resection    Body mass index (BMI) 33.0-33.9, adult 06/21/2021    Body mass index (BMI) of 33.0 to 33.9 in adult    Bursitis of right knee 10/08/2023    Cardiomegaly     Cataract 2013    removed in June/July 2014    Chronic anticoagulation     Chronic back pain     Chronic pain disorder     Class 2 obesity with body mass index (BMI) of 35.0 to 35.9 in adult 02/27/2024    35.31 kg/m²    Closed dislocation of ankle 10/08/2023    Clotting disorder (Multi)     Cognitive decline Dec, 2020    Complement 4 deficiency (Multi)     denies    Compression of vein 07/03/2023    Depression     Disease of thyroid gland 1996?    DJD (degenerative joint disease)     DVT (deep venous thrombosis) (Multi)     2020  left leg    Fibromyalgia     Fibromyalgia, primary 1981    Went to numerous docs before getting diagnosed    GERD (gastroesophageal reflux disease)     Gestational diabetes 1978, 1982, 1986    Hashimoto's disease     History of blood transfusion     2020  after surgery    HL (hearing loss) 2015    HLD (hyperlipidemia)     HTN (hypertension)     denies    Hypercalcemia 10/08/2023    Hyperparathyroidism (Multi)     Hypothyroidism     IBS (irritable bowel syndrome)     Kidney stone 10/08/2023    Lactic acidosis 10/08/2023    Lumbar spondylosis     Lung nodules     multiple    Macular degeneration     Malignant neoplasm of bladder     May-Thurner syndrome     Neck mass     OA (osteoarthritis)     Osteopenia     Osteoporosis     Palpitations 07/03/2023    PE (pulmonary thromboembolism) (Multi) 11/2020    Peptic ulcer 07/10/2008    Peripheral vascular disease     Pregnant ([facility] births 1978, 1983, 1987    Pulmonary embolism     Pulmonary nodule     Right upper quadrant pain 09/03/2021    Abdominal pain, RUQ    Sepsis (Multi) 10/08/2023    Type 2 diabetes mellitus     A1c 5.7 on 3/27/25    Urinary tract infection about 3 in 70s    Not since the 1980s    Vitamin D deficiency     Wears hearing  aid in both ears    [2]   Past Surgical History:  Procedure Laterality Date    BALLOON ANGIOPLASTY, ARTERY      iliac artery stent LLE    BLADDER TUMOR EXCISION  2022    Transurethral resection of bladder tumor    CATARACT EXTRACTION W/  INTRAOCULAR LENS IMPLANT Bilateral     Dr Alfonso    CERVICAL BIOPSY  W/ LOOP ELECTRODE EXCISION      cone biopsy     SECTION, LOW TRANSVERSE      x3    COLONOSCOPY      CT ABDOMEN PELVIS ANGIOGRAM W AND/OR WO IV CONTRAST  2020    CT ABDOMEN PELVIS ANGIOGRAM W AND/OR WO IV CONTRAST [facility]    CYSTOSCOPY      with TURBT  multiple    CYSTOURETHROSCOPY      IR INTERVENTION FILTER PLACEMENT      IVC FILTER RETRIEVAL      ORIF ANKLE FRACTURE Left     PARATHYROID GLAND SURGERY Left 04/10/2025    Exploration    TONSILLECTOMY      TUBAL LIGATION  88    VARICOSE VEIN SURGERY Left     varicose vein ligation   [3]   Family History  Problem Relation Name Age of Onset    Lupus Mother Evelin Borstbasia     Heart disease Mother Evelin Garcíastbasia     Arthritis Mother Evelin Garcíastbasia     Heart disease Father David Borstnik     Cancer Father David Borstnik     Arthritis Father David Borstnik     Hearing loss Father David Borstnik     Hypertension Father Daivd Borstnik     Stroke Father David Borstnik     Vision loss Father David Borstnik     Arthritis Brother Yan Borstbasia     Diabetes Brother Yan Borstnik     Hearing loss Brother Yan Garcíastbasia     Breast cancer Paternal Grandmother      Blood clot Paternal Grandmother      Accidental death Other Alber Larson (Son)     Alcohol abuse Other Alber Larson (Son)    [4]   Allergies  Allergen Reactions    Flexeril [Cyclobenzaprine] Hives, Rash and Other    Augmentin [Amoxicillin-Pot Clavulanate] Other and Nausea/vomiting    Bee Venom Protein (Honey Bee) Swelling    Pneumococcal 23-Katelyn Ps Vaccine Other and Hives    Venom-Wasp Swelling    Venom-Wasp Protein Swelling

## 2025-08-07 ENCOUNTER — TELEPHONE (OUTPATIENT)
Dept: OTOLARYNGOLOGY | Facility: HOSPITAL | Age: 75
End: 2025-08-07
Payer: MEDICARE

## 2025-08-07 ENCOUNTER — TELEPHONE (OUTPATIENT)
Dept: PREADMISSION TESTING | Facility: HOSPITAL | Age: 75
End: 2025-08-07
Payer: MEDICARE

## 2025-08-07 ENCOUNTER — TELEPHONE (OUTPATIENT)
Dept: OTOLARYNGOLOGY | Facility: CLINIC | Age: 75
End: 2025-08-07
Payer: MEDICARE

## 2025-08-07 LAB — STAPHYLOCOCCUS SPEC CULT: NORMAL

## 2025-08-07 NOTE — TELEPHONE ENCOUNTER
Spoke with the patient and reviwed the plan in detail again with her in regard to the potential intraoperative findings as far as it relationship to the surrounding structures of the thyroid and the carotid artery    Rosemarie questions answered      Risks reviewed in detail    She wishes to proceed

## 2025-08-13 ENCOUNTER — HOSPITAL ENCOUNTER (OUTPATIENT)
Dept: VASCULAR MEDICINE | Facility: HOSPITAL | Age: 75
Discharge: HOME | End: 2025-08-13
Payer: MEDICARE

## 2025-08-13 ENCOUNTER — APPOINTMENT (OUTPATIENT)
Dept: VASCULAR SURGERY | Facility: HOSPITAL | Age: 75
End: 2025-08-13
Payer: MEDICARE

## 2025-08-13 DIAGNOSIS — Z01.818 ENCOUNTER FOR OTHER PREPROCEDURAL EXAMINATION: ICD-10-CM

## 2025-08-13 DIAGNOSIS — R22.1 NECK MASS: ICD-10-CM

## 2025-08-13 PROCEDURE — 93971 EXTREMITY STUDY: CPT

## 2025-08-13 PROCEDURE — 93971 EXTREMITY STUDY: CPT | Performed by: INTERNAL MEDICINE

## 2025-08-19 ENCOUNTER — ANESTHESIA EVENT (OUTPATIENT)
Dept: OPERATING ROOM | Facility: HOSPITAL | Age: 75
End: 2025-08-19
Payer: MEDICARE

## 2025-08-20 ENCOUNTER — ANESTHESIA (OUTPATIENT)
Dept: OPERATING ROOM | Facility: HOSPITAL | Age: 75
End: 2025-08-20
Payer: MEDICARE

## 2025-08-20 ENCOUNTER — HOSPITAL ENCOUNTER (INPATIENT)
Facility: HOSPITAL | Age: 75
LOS: 1 days | Discharge: HOME | DRG: 627 | End: 2025-08-21
Attending: OTOLARYNGOLOGY | Admitting: OTOLARYNGOLOGY
Payer: MEDICARE

## 2025-08-20 DIAGNOSIS — R22.1 NECK MASS: ICD-10-CM

## 2025-08-20 DIAGNOSIS — E21.0 PRIMARY HYPERPARATHYROIDISM (MULTI): Primary | ICD-10-CM

## 2025-08-20 PROBLEM — E21.3 HYPERPARATHYROIDISM (MULTI): Status: ACTIVE | Noted: 2025-08-20

## 2025-08-20 PROBLEM — C75.9 ENDOCRINE CANCER (MULTI): Status: ACTIVE | Noted: 2025-08-20

## 2025-08-20 LAB
GLUCOSE BLD MANUAL STRIP-MCNC: 150 MG/DL (ref 74–99)
GLUCOSE BLD MANUAL STRIP-MCNC: 151 MG/DL (ref 74–99)
PTH-INTACT IO % DIF SERPL: 49.5 PG/ML (ref 18.5–88)

## 2025-08-20 PROCEDURE — 3700000001 HC GENERAL ANESTHESIA TIME - INITIAL BASE CHARGE: Performed by: OTOLARYNGOLOGY

## 2025-08-20 PROCEDURE — 3600000009 HC OR TIME - EACH INCREMENTAL 1 MINUTE - PROCEDURE LEVEL FOUR: Performed by: OTOLARYNGOLOGY

## 2025-08-20 PROCEDURE — 7100000002 HC RECOVERY ROOM TIME - EACH INCREMENTAL 1 MINUTE: Performed by: OTOLARYNGOLOGY

## 2025-08-20 PROCEDURE — 37799 UNLISTED PX VASCULAR SURGERY: CPT | Performed by: OTOLARYNGOLOGY

## 2025-08-20 PROCEDURE — 0753T DGTZ GLS MCRSCP SLD LEVEL IV: CPT | Mod: TC,SUR | Performed by: OTOLARYNGOLOGY

## 2025-08-20 PROCEDURE — G0378 HOSPITAL OBSERVATION PER HR: HCPCS

## 2025-08-20 PROCEDURE — 99221 1ST HOSP IP/OBS SF/LOW 40: CPT | Performed by: SURGERY

## 2025-08-20 PROCEDURE — A60502 PR RE-EXPLORE PARATHYROIDS: Performed by: ANESTHESIOLOGY

## 2025-08-20 PROCEDURE — 2500000004 HC RX 250 GENERAL PHARMACY W/ HCPCS (ALT 636 FOR OP/ED)

## 2025-08-20 PROCEDURE — 82947 ASSAY GLUCOSE BLOOD QUANT: CPT

## 2025-08-20 PROCEDURE — 99100 ANES PT EXTEME AGE<1 YR&>70: CPT | Performed by: ANESTHESIOLOGY

## 2025-08-20 PROCEDURE — 7100000001 HC RECOVERY ROOM TIME - INITIAL BASE CHARGE: Performed by: OTOLARYNGOLOGY

## 2025-08-20 PROCEDURE — 2500000002 HC RX 250 W HCPCS SELF ADMINISTERED DRUGS (ALT 637 FOR MEDICARE OP, ALT 636 FOR OP/ED)

## 2025-08-20 PROCEDURE — 2500000004 HC RX 250 GENERAL PHARMACY W/ HCPCS (ALT 636 FOR OP/ED): Performed by: OTOLARYNGOLOGY

## 2025-08-20 PROCEDURE — 2500000004 HC RX 250 GENERAL PHARMACY W/ HCPCS (ALT 636 FOR OP/ED): Mod: JZ,TB

## 2025-08-20 PROCEDURE — 2500000004 HC RX 250 GENERAL PHARMACY W/ HCPCS (ALT 636 FOR OP/ED): Performed by: ANESTHESIOLOGY

## 2025-08-20 PROCEDURE — 3600000004 HC OR TIME - INITIAL BASE CHARGE - PROCEDURE LEVEL FOUR: Performed by: OTOLARYNGOLOGY

## 2025-08-20 PROCEDURE — 36620 INSERTION CATHETER ARTERY: CPT

## 2025-08-20 PROCEDURE — 2720000007 HC OR 272 NO HCPCS: Performed by: OTOLARYNGOLOGY

## 2025-08-20 PROCEDURE — 3600000003 HC OR TIME - INITIAL BASE CHARGE - PROCEDURE LEVEL THREE: Performed by: OTOLARYNGOLOGY

## 2025-08-20 PROCEDURE — 3600000008 HC OR TIME - EACH INCREMENTAL 1 MINUTE - PROCEDURE LEVEL THREE: Performed by: OTOLARYNGOLOGY

## 2025-08-20 PROCEDURE — 2500000001 HC RX 250 WO HCPCS SELF ADMINISTERED DRUGS (ALT 637 FOR MEDICARE OP)

## 2025-08-20 PROCEDURE — 96372 THER/PROPH/DIAG INJ SC/IM: CPT

## 2025-08-20 PROCEDURE — 83970 ASSAY OF PARATHORMONE: CPT | Performed by: OTOLARYNGOLOGY

## 2025-08-20 PROCEDURE — 0GBR0ZZ EXCISION OF PARATHYROID GLAND, OPEN APPROACH: ICD-10-PCS | Performed by: OTOLARYNGOLOGY

## 2025-08-20 PROCEDURE — 2500000005 HC RX 250 GENERAL PHARMACY W/O HCPCS

## 2025-08-20 PROCEDURE — 3700000002 HC GENERAL ANESTHESIA TIME - EACH INCREMENTAL 1 MINUTE: Performed by: OTOLARYNGOLOGY

## 2025-08-20 PROCEDURE — 2500000005 HC RX 250 GENERAL PHARMACY W/O HCPCS: Performed by: OTOLARYNGOLOGY

## 2025-08-20 PROCEDURE — 60502 RE-EXPLORE PARATHYROIDS: CPT | Performed by: OTOLARYNGOLOGY

## 2025-08-20 RX ORDER — ONDANSETRON HYDROCHLORIDE 2 MG/ML
4 INJECTION, SOLUTION INTRAVENOUS EVERY 8 HOURS PRN
Status: DISCONTINUED | OUTPATIENT
Start: 2025-08-20 | End: 2025-08-21 | Stop reason: HOSPADM

## 2025-08-20 RX ORDER — NALOXONE HYDROCHLORIDE 0.4 MG/ML
0.2 INJECTION, SOLUTION INTRAMUSCULAR; INTRAVENOUS; SUBCUTANEOUS EVERY 5 MIN PRN
Status: DISCONTINUED | OUTPATIENT
Start: 2025-08-20 | End: 2025-08-21 | Stop reason: HOSPADM

## 2025-08-20 RX ORDER — HYDROMORPHONE HYDROCHLORIDE 0.2 MG/ML
0.1 INJECTION INTRAMUSCULAR; INTRAVENOUS; SUBCUTANEOUS EVERY 5 MIN PRN
Status: DISCONTINUED | OUTPATIENT
Start: 2025-08-20 | End: 2025-08-20 | Stop reason: HOSPADM

## 2025-08-20 RX ORDER — OXYCODONE HYDROCHLORIDE 5 MG/1
5 TABLET ORAL EVERY 6 HOURS PRN
Refills: 0 | Status: DISCONTINUED | OUTPATIENT
Start: 2025-08-20 | End: 2025-08-21 | Stop reason: HOSPADM

## 2025-08-20 RX ORDER — PHENYLEPHRINE HCL IN 0.9% NACL 1 MG/10 ML
SYRINGE (ML) INTRAVENOUS AS NEEDED
Status: DISCONTINUED | OUTPATIENT
Start: 2025-08-20 | End: 2025-08-20

## 2025-08-20 RX ORDER — MIDAZOLAM HYDROCHLORIDE 2 MG/2ML
INJECTION, SOLUTION INTRAMUSCULAR; INTRAVENOUS AS NEEDED
Status: DISCONTINUED | OUTPATIENT
Start: 2025-08-20 | End: 2025-08-20

## 2025-08-20 RX ORDER — LEVOTHYROXINE SODIUM 100 UG/1
100 TABLET ORAL DAILY
Status: DISCONTINUED | OUTPATIENT
Start: 2025-08-20 | End: 2025-08-21 | Stop reason: HOSPADM

## 2025-08-20 RX ORDER — SODIUM CHLORIDE, SODIUM LACTATE, POTASSIUM CHLORIDE, CALCIUM CHLORIDE 600; 310; 30; 20 MG/100ML; MG/100ML; MG/100ML; MG/100ML
INJECTION, SOLUTION INTRAVENOUS CONTINUOUS PRN
Status: DISCONTINUED | OUTPATIENT
Start: 2025-08-20 | End: 2025-08-20

## 2025-08-20 RX ORDER — REMIFENTANIL HYDROCHLORIDE 1 MG/ML
INJECTION, POWDER, LYOPHILIZED, FOR SOLUTION INTRAVENOUS CONTINUOUS PRN
Status: DISCONTINUED | OUTPATIENT
Start: 2025-08-20 | End: 2025-08-20

## 2025-08-20 RX ORDER — OXYCODONE HYDROCHLORIDE 5 MG/1
5 TABLET ORAL EVERY 8 HOURS PRN
Qty: 15 TABLET | Refills: 0 | Status: SHIPPED | OUTPATIENT
Start: 2025-08-20 | End: 2025-08-21

## 2025-08-20 RX ORDER — ALBUTEROL SULFATE 0.83 MG/ML
2.5 SOLUTION RESPIRATORY (INHALATION) ONCE AS NEEDED
Status: DISCONTINUED | OUTPATIENT
Start: 2025-08-20 | End: 2025-08-20 | Stop reason: HOSPADM

## 2025-08-20 RX ORDER — POLYETHYLENE GLYCOL 3350 17 G/17G
17 POWDER, FOR SOLUTION ORAL DAILY
Qty: 7 PACKET | Refills: 0 | Status: SHIPPED | OUTPATIENT
Start: 2025-08-20 | End: 2025-08-21

## 2025-08-20 RX ORDER — ONDANSETRON HYDROCHLORIDE 2 MG/ML
INJECTION, SOLUTION INTRAVENOUS AS NEEDED
Status: DISCONTINUED | OUTPATIENT
Start: 2025-08-20 | End: 2025-08-20

## 2025-08-20 RX ORDER — ENOXAPARIN SODIUM 100 MG/ML
40 INJECTION SUBCUTANEOUS EVERY 24 HOURS
Status: DISCONTINUED | OUTPATIENT
Start: 2025-08-20 | End: 2025-08-21 | Stop reason: HOSPADM

## 2025-08-20 RX ORDER — ONDANSETRON HYDROCHLORIDE 2 MG/ML
4 INJECTION, SOLUTION INTRAVENOUS ONCE AS NEEDED
Status: COMPLETED | OUTPATIENT
Start: 2025-08-20 | End: 2025-08-20

## 2025-08-20 RX ORDER — ACETAMINOPHEN 10 MG/ML
INJECTION, SOLUTION INTRAVENOUS AS NEEDED
Status: DISCONTINUED | OUTPATIENT
Start: 2025-08-20 | End: 2025-08-20

## 2025-08-20 RX ORDER — FAMOTIDINE 20 MG/1
20 TABLET, FILM COATED ORAL DAILY PRN
Status: DISCONTINUED | OUTPATIENT
Start: 2025-08-20 | End: 2025-08-21 | Stop reason: HOSPADM

## 2025-08-20 RX ORDER — OXYCODONE HCL 5 MG/5 ML
5 SOLUTION, ORAL ORAL EVERY 4 HOURS PRN
Refills: 0 | Status: DISCONTINUED | OUTPATIENT
Start: 2025-08-20 | End: 2025-08-21 | Stop reason: HOSPADM

## 2025-08-20 RX ORDER — ONDANSETRON 4 MG/1
4 TABLET, ORALLY DISINTEGRATING ORAL EVERY 8 HOURS PRN
Status: DISCONTINUED | OUTPATIENT
Start: 2025-08-20 | End: 2025-08-21 | Stop reason: HOSPADM

## 2025-08-20 RX ORDER — HYDROMORPHONE HYDROCHLORIDE 1 MG/ML
INJECTION, SOLUTION INTRAMUSCULAR; INTRAVENOUS; SUBCUTANEOUS AS NEEDED
Status: DISCONTINUED | OUTPATIENT
Start: 2025-08-20 | End: 2025-08-20

## 2025-08-20 RX ORDER — LIDOCAINE HYDROCHLORIDE 10 MG/ML
0.1 INJECTION, SOLUTION INFILTRATION; PERINEURAL ONCE
Status: DISCONTINUED | OUTPATIENT
Start: 2025-08-20 | End: 2025-08-20 | Stop reason: HOSPADM

## 2025-08-20 RX ORDER — CEFAZOLIN 1 G/1
INJECTION, POWDER, FOR SOLUTION INTRAVENOUS AS NEEDED
Status: DISCONTINUED | OUTPATIENT
Start: 2025-08-20 | End: 2025-08-20

## 2025-08-20 RX ORDER — LIDOCAINE HCL/PF 100 MG/5ML
SYRINGE (ML) INTRAVENOUS AS NEEDED
Status: DISCONTINUED | OUTPATIENT
Start: 2025-08-20 | End: 2025-08-20

## 2025-08-20 RX ORDER — LIDOCAINE HYDROCHLORIDE AND EPINEPHRINE 10; 10 UG/ML; MG/ML
INJECTION, SOLUTION INFILTRATION; PERINEURAL AS NEEDED
Status: DISCONTINUED | OUTPATIENT
Start: 2025-08-20 | End: 2025-08-20 | Stop reason: HOSPADM

## 2025-08-20 RX ORDER — ACETAMINOPHEN 160 MG/5ML
1000 SOLUTION ORAL EVERY 8 HOURS SCHEDULED
Status: DISCONTINUED | OUTPATIENT
Start: 2025-08-20 | End: 2025-08-21 | Stop reason: HOSPADM

## 2025-08-20 RX ORDER — HYDROMORPHONE HYDROCHLORIDE 0.2 MG/ML
0.2 INJECTION INTRAMUSCULAR; INTRAVENOUS; SUBCUTANEOUS EVERY 5 MIN PRN
Status: DISCONTINUED | OUTPATIENT
Start: 2025-08-20 | End: 2025-08-20 | Stop reason: HOSPADM

## 2025-08-20 RX ORDER — PROPOFOL 10 MG/ML
INJECTION, EMULSION INTRAVENOUS AS NEEDED
Status: DISCONTINUED | OUTPATIENT
Start: 2025-08-20 | End: 2025-08-20

## 2025-08-20 RX ORDER — SODIUM CHLORIDE, SODIUM LACTATE, POTASSIUM CHLORIDE, CALCIUM CHLORIDE 600; 310; 30; 20 MG/100ML; MG/100ML; MG/100ML; MG/100ML
100 INJECTION, SOLUTION INTRAVENOUS CONTINUOUS
Status: ACTIVE | OUTPATIENT
Start: 2025-08-20 | End: 2025-08-20

## 2025-08-20 RX ORDER — POLYETHYLENE GLYCOL 3350 17 G/17G
17 POWDER, FOR SOLUTION ORAL DAILY
Status: DISCONTINUED | OUTPATIENT
Start: 2025-08-20 | End: 2025-08-21 | Stop reason: HOSPADM

## 2025-08-20 RX ORDER — SODIUM CHLORIDE 0.9 G/100ML
INJECTION, SOLUTION IRRIGATION AS NEEDED
Status: DISCONTINUED | OUTPATIENT
Start: 2025-08-20 | End: 2025-08-20 | Stop reason: HOSPADM

## 2025-08-20 RX ORDER — PROCHLORPERAZINE EDISYLATE 5 MG/ML
5 INJECTION INTRAMUSCULAR; INTRAVENOUS ONCE AS NEEDED
Status: COMPLETED | OUTPATIENT
Start: 2025-08-20 | End: 2025-08-20

## 2025-08-20 RX ORDER — ACETAMINOPHEN 325 MG/1
975 TABLET ORAL EVERY 8 HOURS SCHEDULED
Status: DISCONTINUED | OUTPATIENT
Start: 2025-08-20 | End: 2025-08-21 | Stop reason: HOSPADM

## 2025-08-20 RX ORDER — ROSUVASTATIN CALCIUM 20 MG/1
20 TABLET, COATED ORAL NIGHTLY
Status: DISCONTINUED | OUTPATIENT
Start: 2025-08-20 | End: 2025-08-21 | Stop reason: HOSPADM

## 2025-08-20 RX ORDER — FENTANYL CITRATE 50 UG/ML
INJECTION, SOLUTION INTRAMUSCULAR; INTRAVENOUS AS NEEDED
Status: DISCONTINUED | OUTPATIENT
Start: 2025-08-20 | End: 2025-08-20

## 2025-08-20 RX ADMIN — REMIFENTANIL HYDROCHLORIDE 0.05 MCG/KG/MIN: 1 INJECTION, POWDER, LYOPHILIZED, FOR SOLUTION INTRAVENOUS at 09:23

## 2025-08-20 RX ADMIN — FENTANYL CITRATE 50 MCG: 50 INJECTION, SOLUTION INTRAMUSCULAR; INTRAVENOUS at 09:39

## 2025-08-20 RX ADMIN — ONDANSETRON 4 MG: 2 INJECTION INTRAMUSCULAR; INTRAVENOUS at 12:26

## 2025-08-20 RX ADMIN — PROPOFOL 30 MG: 10 INJECTION, EMULSION INTRAVENOUS at 08:59

## 2025-08-20 RX ADMIN — REMIFENTANIL HYDROCHLORIDE 20 MCG: 1 INJECTION, POWDER, LYOPHILIZED, FOR SOLUTION INTRAVENOUS at 09:35

## 2025-08-20 RX ADMIN — ENOXAPARIN SODIUM 40 MG: 100 INJECTION SUBCUTANEOUS at 16:45

## 2025-08-20 RX ADMIN — REMIFENTANIL HYDROCHLORIDE 20 MCG: 1 INJECTION, POWDER, LYOPHILIZED, FOR SOLUTION INTRAVENOUS at 09:38

## 2025-08-20 RX ADMIN — SODIUM CHLORIDE, SODIUM LACTATE, POTASSIUM CHLORIDE, AND CALCIUM CHLORIDE 100 ML/HR: .6; .31; .03; .02 INJECTION, SOLUTION INTRAVENOUS at 12:11

## 2025-08-20 RX ADMIN — LEVOTHYROXINE SODIUM 100 MCG: 0.1 TABLET ORAL at 16:45

## 2025-08-20 RX ADMIN — HYDROMORPHONE HYDROCHLORIDE 0.4 MG: 1 INJECTION, SOLUTION INTRAMUSCULAR; INTRAVENOUS; SUBCUTANEOUS at 11:13

## 2025-08-20 RX ADMIN — REMIFENTANIL HYDROCHLORIDE 20 MCG: 1 INJECTION, POWDER, LYOPHILIZED, FOR SOLUTION INTRAVENOUS at 10:09

## 2025-08-20 RX ADMIN — SODIUM CHLORIDE, POTASSIUM CHLORIDE, SODIUM LACTATE AND CALCIUM CHLORIDE: 600; 310; 30; 20 INJECTION, SOLUTION INTRAVENOUS at 08:34

## 2025-08-20 RX ADMIN — HYDROMORPHONE HYDROCHLORIDE 0.5 MG: 1 INJECTION, SOLUTION INTRAMUSCULAR; INTRAVENOUS; SUBCUTANEOUS at 12:34

## 2025-08-20 RX ADMIN — FENTANYL CITRATE 50 MCG: 50 INJECTION, SOLUTION INTRAMUSCULAR; INTRAVENOUS at 08:41

## 2025-08-20 RX ADMIN — HYDROMORPHONE HYDROCHLORIDE 0.5 MG: 1 INJECTION, SOLUTION INTRAMUSCULAR; INTRAVENOUS; SUBCUTANEOUS at 12:51

## 2025-08-20 RX ADMIN — HYDROMORPHONE HYDROCHLORIDE 0.2 MG: 1 INJECTION, SOLUTION INTRAMUSCULAR; INTRAVENOUS; SUBCUTANEOUS at 10:10

## 2025-08-20 RX ADMIN — Medication 160 MCG: at 10:04

## 2025-08-20 RX ADMIN — ACETAMINOPHEN 1000 MG: 10 INJECTION, SOLUTION INTRAVENOUS at 11:35

## 2025-08-20 RX ADMIN — REMIFENTANIL HYDROCHLORIDE 80 MCG: 1 INJECTION, POWDER, LYOPHILIZED, FOR SOLUTION INTRAVENOUS at 09:56

## 2025-08-20 RX ADMIN — ONDANSETRON 4 MG: 2 INJECTION INTRAMUSCULAR; INTRAVENOUS at 11:39

## 2025-08-20 RX ADMIN — PROCHLORPERAZINE EDISYLATE 5 MG: 5 INJECTION INTRAMUSCULAR; INTRAVENOUS at 14:12

## 2025-08-20 RX ADMIN — ACETAMINOPHEN 975 MG: 325 TABLET, FILM COATED ORAL at 22:20

## 2025-08-20 RX ADMIN — Medication 120 MCG: at 10:01

## 2025-08-20 RX ADMIN — PROPOFOL 200 MG: 10 INJECTION, EMULSION INTRAVENOUS at 08:41

## 2025-08-20 RX ADMIN — ROSUVASTATIN CALCIUM 20 MG: 20 TABLET, FILM COATED ORAL at 20:24

## 2025-08-20 RX ADMIN — REMIFENTANIL HYDROCHLORIDE 20 MCG: 1 INJECTION, POWDER, LYOPHILIZED, FOR SOLUTION INTRAVENOUS at 10:12

## 2025-08-20 RX ADMIN — DEXAMETHASONE SODIUM PHOSPHATE 4 MG: 4 INJECTION INTRA-ARTICULAR; INTRALESIONAL; INTRAMUSCULAR; INTRAVENOUS; SOFT TISSUE at 08:41

## 2025-08-20 RX ADMIN — Medication: at 13:00

## 2025-08-20 RX ADMIN — LIDOCAINE HYDROCHLORIDE 70 MG: 20 INJECTION INTRAVENOUS at 08:41

## 2025-08-20 RX ADMIN — HYDROMORPHONE HYDROCHLORIDE 0.2 MG: 1 INJECTION, SOLUTION INTRAMUSCULAR; INTRAVENOUS; SUBCUTANEOUS at 12:20

## 2025-08-20 RX ADMIN — CEFAZOLIN 2 G: 1 INJECTION, POWDER, FOR SOLUTION INTRAMUSCULAR; INTRAVENOUS at 08:50

## 2025-08-20 SDOH — SOCIAL STABILITY: SOCIAL INSECURITY: WITHIN THE LAST YEAR, HAVE YOU BEEN HUMILIATED OR EMOTIONALLY ABUSED IN OTHER WAYS BY YOUR PARTNER OR EX-PARTNER?: NO

## 2025-08-20 SDOH — HEALTH STABILITY: MENTAL HEALTH: CURRENT SMOKER: 0

## 2025-08-20 SDOH — SOCIAL STABILITY: SOCIAL INSECURITY: HAVE YOU HAD THOUGHTS OF HARMING ANYONE ELSE?: YES

## 2025-08-20 SDOH — SOCIAL STABILITY: SOCIAL INSECURITY
WITHIN THE LAST YEAR, HAVE YOU BEEN RAPED OR FORCED TO HAVE ANY KIND OF SEXUAL ACTIVITY BY YOUR PARTNER OR EX-PARTNER?: NO

## 2025-08-20 SDOH — SOCIAL STABILITY: SOCIAL INSECURITY: WITHIN THE LAST YEAR, HAVE YOU BEEN AFRAID OF YOUR PARTNER OR EX-PARTNER?: NO

## 2025-08-20 SDOH — ECONOMIC STABILITY: FOOD INSECURITY: WITHIN THE PAST 12 MONTHS, THE FOOD YOU BOUGHT JUST DIDN'T LAST AND YOU DIDN'T HAVE MONEY TO GET MORE.: NEVER TRUE

## 2025-08-20 SDOH — SOCIAL STABILITY: SOCIAL INSECURITY: ARE YOU OR HAVE YOU BEEN THREATENED OR ABUSED PHYSICALLY, EMOTIONALLY, OR SEXUALLY BY ANYONE?: NO

## 2025-08-20 SDOH — SOCIAL STABILITY: SOCIAL INSECURITY: DO YOU FEEL ANYONE HAS EXPLOITED OR TAKEN ADVANTAGE OF YOU FINANCIALLY OR OF YOUR PERSONAL PROPERTY?: NO

## 2025-08-20 SDOH — ECONOMIC STABILITY: HOUSING INSECURITY: AT ANY TIME IN THE PAST 12 MONTHS, WERE YOU HOMELESS OR LIVING IN A SHELTER (INCLUDING NOW)?: NO

## 2025-08-20 SDOH — ECONOMIC STABILITY: HOUSING INSECURITY: IN THE LAST 12 MONTHS, WAS THERE A TIME WHEN YOU WERE NOT ABLE TO PAY THE MORTGAGE OR RENT ON TIME?: NO

## 2025-08-20 SDOH — SOCIAL STABILITY: SOCIAL INSECURITY: ABUSE: ADULT

## 2025-08-20 SDOH — ECONOMIC STABILITY: FOOD INSECURITY: HOW HARD IS IT FOR YOU TO PAY FOR THE VERY BASICS LIKE FOOD, HOUSING, MEDICAL CARE, AND HEATING?: NOT VERY HARD

## 2025-08-20 SDOH — SOCIAL STABILITY: SOCIAL INSECURITY: DO YOU FEEL UNSAFE GOING BACK TO THE PLACE WHERE YOU ARE LIVING?: NO

## 2025-08-20 SDOH — SOCIAL STABILITY: SOCIAL INSECURITY: WERE YOU ABLE TO COMPLETE ALL THE BEHAVIORAL HEALTH SCREENINGS?: YES

## 2025-08-20 SDOH — ECONOMIC STABILITY: INCOME INSECURITY: IN THE PAST 12 MONTHS HAS THE ELECTRIC, GAS, OIL, OR WATER COMPANY THREATENED TO SHUT OFF SERVICES IN YOUR HOME?: NO

## 2025-08-20 SDOH — ECONOMIC STABILITY: HOUSING INSECURITY: IN THE PAST 12 MONTHS, HOW MANY TIMES HAVE YOU MOVED WHERE YOU WERE LIVING?: 0

## 2025-08-20 SDOH — ECONOMIC STABILITY: FOOD INSECURITY: WITHIN THE PAST 12 MONTHS, YOU WORRIED THAT YOUR FOOD WOULD RUN OUT BEFORE YOU GOT THE MONEY TO BUY MORE.: NEVER TRUE

## 2025-08-20 SDOH — SOCIAL STABILITY: SOCIAL INSECURITY: DOES ANYONE TRY TO KEEP YOU FROM HAVING/CONTACTING OTHER FRIENDS OR DOING THINGS OUTSIDE YOUR HOME?: NO

## 2025-08-20 SDOH — SOCIAL STABILITY: SOCIAL INSECURITY
WITHIN THE LAST YEAR, HAVE YOU BEEN KICKED, HIT, SLAPPED, OR OTHERWISE PHYSICALLY HURT BY YOUR PARTNER OR EX-PARTNER?: NO

## 2025-08-20 SDOH — SOCIAL STABILITY: SOCIAL INSECURITY: ARE THERE ANY APPARENT SIGNS OF INJURIES/BEHAVIORS THAT COULD BE RELATED TO ABUSE/NEGLECT?: NO

## 2025-08-20 SDOH — SOCIAL STABILITY: SOCIAL INSECURITY: HAS ANYONE EVER THREATENED TO HURT YOUR FAMILY OR YOUR PETS?: NO

## 2025-08-20 SDOH — SOCIAL STABILITY: SOCIAL INSECURITY: HAVE YOU HAD ANY THOUGHTS OF HARMING ANYONE ELSE?: NO

## 2025-08-20 SDOH — ECONOMIC STABILITY: TRANSPORTATION INSECURITY: IN THE PAST 12 MONTHS, HAS LACK OF TRANSPORTATION KEPT YOU FROM MEDICAL APPOINTMENTS OR FROM GETTING MEDICATIONS?: NO

## 2025-08-20 ASSESSMENT — COGNITIVE AND FUNCTIONAL STATUS - GENERAL
MOBILITY SCORE: 18
DAILY ACTIVITIY SCORE: 18
EATING MEALS: A LITTLE
MOVING FROM LYING ON BACK TO SITTING ON SIDE OF FLAT BED WITH BEDRAILS: A LITTLE
PERSONAL GROOMING: A LITTLE
TURNING FROM BACK TO SIDE WHILE IN FLAT BAD: A LITTLE
MOVING TO AND FROM BED TO CHAIR: A LITTLE
TOILETING: A LITTLE
DRESSING REGULAR LOWER BODY CLOTHING: A LITTLE
DRESSING REGULAR UPPER BODY CLOTHING: A LITTLE
PATIENT BASELINE BEDBOUND: NO
WALKING IN HOSPITAL ROOM: A LITTLE
STANDING UP FROM CHAIR USING ARMS: A LITTLE
CLIMB 3 TO 5 STEPS WITH RAILING: A LITTLE
HELP NEEDED FOR BATHING: A LITTLE

## 2025-08-20 ASSESSMENT — LIFESTYLE VARIABLES
SKIP TO QUESTIONS 9-10: 1
HOW OFTEN DO YOU HAVE A DRINK CONTAINING ALCOHOL: MONTHLY OR LESS
AUDIT-C TOTAL SCORE: 1
HOW MANY STANDARD DRINKS CONTAINING ALCOHOL DO YOU HAVE ON A TYPICAL DAY: PATIENT DOES NOT DRINK
AUDIT-C TOTAL SCORE: 1
HOW OFTEN DO YOU HAVE 6 OR MORE DRINKS ON ONE OCCASION: NEVER

## 2025-08-20 ASSESSMENT — ACTIVITIES OF DAILY LIVING (ADL)
PATIENT'S MEMORY ADEQUATE TO SAFELY COMPLETE DAILY ACTIVITIES?: YES
GROOMING: INDEPENDENT
TOILETING: INDEPENDENT
LACK_OF_TRANSPORTATION: NO
DRESSING YOURSELF: INDEPENDENT
ADEQUATE_TO_COMPLETE_ADL: YES
ADEQUATE_TO_COMPLETE_ADL: YES
ASSISTIVE_DEVICE: HEARING AID - RIGHT;HEARING AID - LEFT
HEARING - LEFT EAR: HEARING AID
GROOMING: INDEPENDENT
WALKS IN HOME: INDEPENDENT
TOILETING: INDEPENDENT
HEARING - LEFT EAR: HEARING AID
FEEDING YOURSELF: INDEPENDENT
HEARING - RIGHT EAR: HEARING AID
PATIENT'S MEMORY ADEQUATE TO SAFELY COMPLETE DAILY ACTIVITIES?: YES
LACK_OF_TRANSPORTATION: NO
HEARING - RIGHT EAR: HEARING AID
FEEDING YOURSELF: INDEPENDENT
BATHING: INDEPENDENT
WALKS IN HOME: INDEPENDENT
ASSISTIVE_DEVICE: HEARING AID - RIGHT;HEARING AID - LEFT
JUDGMENT_ADEQUATE_SAFELY_COMPLETE_DAILY_ACTIVITIES: YES
BATHING: INDEPENDENT
DRESSING YOURSELF: INDEPENDENT
JUDGMENT_ADEQUATE_SAFELY_COMPLETE_DAILY_ACTIVITIES: YES

## 2025-08-20 ASSESSMENT — PAIN - FUNCTIONAL ASSESSMENT
PAIN_FUNCTIONAL_ASSESSMENT: 0-10

## 2025-08-20 ASSESSMENT — PAIN SCALES - GENERAL
PAINLEVEL_OUTOF10: 7
PAINLEVEL_OUTOF10: 0 - NO PAIN
PAINLEVEL_OUTOF10: 0 - NO PAIN
PAIN_LEVEL: 2
PAINLEVEL_OUTOF10: 7
PAINLEVEL_OUTOF10: 2
PAINLEVEL_OUTOF10: 0 - NO PAIN
PAINLEVEL_OUTOF10: 0 - NO PAIN

## 2025-08-20 ASSESSMENT — PAIN DESCRIPTION - ORIENTATION
ORIENTATION: ANTERIOR
ORIENTATION: ANTERIOR

## 2025-08-20 ASSESSMENT — PATIENT HEALTH QUESTIONNAIRE - PHQ9
2. FEELING DOWN, DEPRESSED OR HOPELESS: NOT AT ALL
1. LITTLE INTEREST OR PLEASURE IN DOING THINGS: NOT AT ALL
SUM OF ALL RESPONSES TO PHQ9 QUESTIONS 1 & 2: 0

## 2025-08-20 ASSESSMENT — PAIN DESCRIPTION - LOCATION
LOCATION: NECK
LOCATION: NECK

## 2025-08-21 ENCOUNTER — PHARMACY VISIT (OUTPATIENT)
Dept: PHARMACY | Facility: CLINIC | Age: 75
End: 2025-08-21
Payer: COMMERCIAL

## 2025-08-21 VITALS
RESPIRATION RATE: 18 BRPM | BODY MASS INDEX: 31.2 KG/M2 | HEART RATE: 88 BPM | DIASTOLIC BLOOD PRESSURE: 75 MMHG | WEIGHT: 182.76 LBS | TEMPERATURE: 97.5 F | OXYGEN SATURATION: 98 % | SYSTOLIC BLOOD PRESSURE: 135 MMHG | HEIGHT: 64 IN

## 2025-08-21 LAB
ALBUMIN SERPL BCP-MCNC: 3.9 G/DL (ref 3.4–5)
ANION GAP SERPL CALC-SCNC: 13 MMOL/L (ref 10–20)
BUN SERPL-MCNC: 23 MG/DL (ref 6–23)
CALCIUM SERPL-MCNC: 9.2 MG/DL (ref 8.6–10.6)
CHLORIDE SERPL-SCNC: 100 MMOL/L (ref 98–107)
CO2 SERPL-SCNC: 25 MMOL/L (ref 21–32)
CREAT SERPL-MCNC: 1.01 MG/DL (ref 0.5–1.05)
EGFRCR SERPLBLD CKD-EPI 2021: 59 ML/MIN/1.73M*2
GLUCOSE BLD MANUAL STRIP-MCNC: 220 MG/DL (ref 74–99)
GLUCOSE SERPL-MCNC: 133 MG/DL (ref 74–99)
PHOSPHATE SERPL-MCNC: 3 MG/DL (ref 2.5–4.9)
POTASSIUM SERPL-SCNC: 4 MMOL/L (ref 3.5–5.3)
PTH-INTACT SERPL-MCNC: 24.7 PG/ML (ref 18.5–88)
SODIUM SERPL-SCNC: 134 MMOL/L (ref 136–145)

## 2025-08-21 PROCEDURE — 99232 SBSQ HOSP IP/OBS MODERATE 35: CPT | Performed by: NURSE PRACTITIONER

## 2025-08-21 PROCEDURE — 1170000001 HC PRIVATE ONCOLOGY ROOM DAILY

## 2025-08-21 PROCEDURE — 2500000001 HC RX 250 WO HCPCS SELF ADMINISTERED DRUGS (ALT 637 FOR MEDICARE OP)

## 2025-08-21 PROCEDURE — RXMED WILLOW AMBULATORY MEDICATION CHARGE

## 2025-08-21 PROCEDURE — 83970 ASSAY OF PARATHORMONE: CPT

## 2025-08-21 PROCEDURE — 2500000002 HC RX 250 W HCPCS SELF ADMINISTERED DRUGS (ALT 637 FOR MEDICARE OP, ALT 636 FOR OP/ED)

## 2025-08-21 PROCEDURE — 80069 RENAL FUNCTION PANEL: CPT

## 2025-08-21 PROCEDURE — 82947 ASSAY GLUCOSE BLOOD QUANT: CPT

## 2025-08-21 PROCEDURE — 36415 COLL VENOUS BLD VENIPUNCTURE: CPT

## 2025-08-21 PROCEDURE — 2500000001 HC RX 250 WO HCPCS SELF ADMINISTERED DRUGS (ALT 637 FOR MEDICARE OP): Performed by: NURSE PRACTITIONER

## 2025-08-21 RX ORDER — POLYETHYLENE GLYCOL 3350 17 G/17G
17 POWDER, FOR SOLUTION ORAL DAILY
Qty: 7 PACKET | Refills: 0 | Status: SHIPPED | OUTPATIENT
Start: 2025-08-21 | End: 2025-08-28

## 2025-08-21 RX ORDER — OXYCODONE HYDROCHLORIDE 5 MG/1
5 TABLET ORAL EVERY 8 HOURS PRN
Qty: 15 TABLET | Refills: 0 | Status: SHIPPED | OUTPATIENT
Start: 2025-08-21

## 2025-08-21 RX ORDER — ACETAMINOPHEN 325 MG/1
975 TABLET ORAL EVERY 8 HOURS SCHEDULED
Qty: 126 TABLET | Refills: 0 | Status: SHIPPED | OUTPATIENT
Start: 2025-08-21 | End: 2025-09-04

## 2025-08-21 RX ORDER — ONDANSETRON 4 MG/1
4 TABLET, ORALLY DISINTEGRATING ORAL EVERY 8 HOURS PRN
Qty: 20 TABLET | Refills: 0 | Status: SHIPPED | OUTPATIENT
Start: 2025-08-21 | End: 2025-09-20

## 2025-08-21 RX ORDER — IBUPROFEN 400 MG/1
200 TABLET, FILM COATED ORAL ONCE
Status: COMPLETED | OUTPATIENT
Start: 2025-08-21 | End: 2025-08-21

## 2025-08-21 RX ADMIN — ACETAMINOPHEN 975 MG: 325 TABLET, FILM COATED ORAL at 05:42

## 2025-08-21 RX ADMIN — IBUPROFEN 200 MG: 400 TABLET ORAL at 10:19

## 2025-08-21 RX ADMIN — LEVOTHYROXINE SODIUM 100 MCG: 0.1 TABLET ORAL at 05:42

## 2025-08-21 ASSESSMENT — COGNITIVE AND FUNCTIONAL STATUS - GENERAL
MOBILITY SCORE: 24
DAILY ACTIVITIY SCORE: 24

## 2025-08-21 ASSESSMENT — PAIN - FUNCTIONAL ASSESSMENT: PAIN_FUNCTIONAL_ASSESSMENT: 0-10

## 2025-08-21 ASSESSMENT — PAIN SCALES - GENERAL: PAINLEVEL_OUTOF10: 1

## 2025-08-26 ENCOUNTER — TELEPHONE (OUTPATIENT)
Dept: OTOLARYNGOLOGY | Facility: HOSPITAL | Age: 75
End: 2025-08-26
Payer: MEDICARE

## 2025-08-26 DIAGNOSIS — E21.3 HYPERPARATHYROIDISM (MULTI): ICD-10-CM

## 2025-08-26 DIAGNOSIS — R22.31 LOCALIZED SWELLING OF RIGHT UPPER EXTREMITY: ICD-10-CM

## 2025-08-26 RX ORDER — DOXYCYCLINE 100 MG/1
100 CAPSULE ORAL 2 TIMES DAILY
Qty: 14 CAPSULE | Refills: 0 | Status: SHIPPED | OUTPATIENT
Start: 2025-08-26 | End: 2025-09-02

## 2025-08-26 RX ORDER — CEPHALEXIN 250 MG/1
250 CAPSULE ORAL 2 TIMES DAILY
Qty: 14 CAPSULE | Refills: 0 | Status: CANCELLED | OUTPATIENT
Start: 2025-08-26 | End: 2025-09-02

## 2025-08-27 ENCOUNTER — OFFICE VISIT (OUTPATIENT)
Dept: PRIMARY CARE | Facility: CLINIC | Age: 75
End: 2025-08-27
Payer: MEDICARE

## 2025-08-27 VITALS
SYSTOLIC BLOOD PRESSURE: 154 MMHG | HEART RATE: 91 BPM | HEIGHT: 64 IN | WEIGHT: 184 LBS | OXYGEN SATURATION: 95 % | DIASTOLIC BLOOD PRESSURE: 89 MMHG | BODY MASS INDEX: 31.41 KG/M2

## 2025-08-27 DIAGNOSIS — G62.9 NEUROPATHY: Primary | ICD-10-CM

## 2025-08-27 DIAGNOSIS — E21.0 PRIMARY HYPERPARATHYROIDISM (MULTI): ICD-10-CM

## 2025-08-27 DIAGNOSIS — E03.9 ACQUIRED HYPOTHYROIDISM: ICD-10-CM

## 2025-08-27 LAB
LABORATORY COMMENT REPORT: NORMAL
Lab: NORMAL
PATH REPORT.FINAL DX SPEC: NORMAL
PATH REPORT.GROSS SPEC: NORMAL
PATH REPORT.RELEVANT HX SPEC: NORMAL
PATH REPORT.TOTAL CANCER: NORMAL
RESIDENT REVIEW: NORMAL

## 2025-08-27 PROCEDURE — 1111F DSCHRG MED/CURRENT MED MERGE: CPT | Performed by: NURSE PRACTITIONER

## 2025-08-27 PROCEDURE — 3079F DIAST BP 80-89 MM HG: CPT | Performed by: NURSE PRACTITIONER

## 2025-08-27 PROCEDURE — 1159F MED LIST DOCD IN RCRD: CPT | Performed by: NURSE PRACTITIONER

## 2025-08-27 PROCEDURE — 3044F HG A1C LEVEL LT 7.0%: CPT | Performed by: NURSE PRACTITIONER

## 2025-08-27 PROCEDURE — 1036F TOBACCO NON-USER: CPT | Performed by: NURSE PRACTITIONER

## 2025-08-27 PROCEDURE — 3077F SYST BP >= 140 MM HG: CPT | Performed by: NURSE PRACTITIONER

## 2025-08-27 PROCEDURE — 3008F BODY MASS INDEX DOCD: CPT | Performed by: NURSE PRACTITIONER

## 2025-08-27 PROCEDURE — 99214 OFFICE O/P EST MOD 30 MIN: CPT | Performed by: NURSE PRACTITIONER

## 2025-08-27 RX ORDER — GABAPENTIN 100 MG/1
100 CAPSULE ORAL 2 TIMES DAILY
Qty: 28 CAPSULE | Refills: 0 | Status: SHIPPED | OUTPATIENT
Start: 2025-08-27 | End: 2025-09-10

## 2025-08-27 ASSESSMENT — ENCOUNTER SYMPTOMS
DEPRESSION: 0
OCCASIONAL FEELINGS OF UNSTEADINESS: 0
LOSS OF SENSATION IN FEET: 0

## 2025-08-27 ASSESSMENT — PATIENT HEALTH QUESTIONNAIRE - PHQ9
2. FEELING DOWN, DEPRESSED OR HOPELESS: NOT AT ALL
SUM OF ALL RESPONSES TO PHQ9 QUESTIONS 1 AND 2: 0
1. LITTLE INTEREST OR PLEASURE IN DOING THINGS: NOT AT ALL

## 2025-08-28 ENCOUNTER — RESULTS FOLLOW-UP (OUTPATIENT)
Dept: OTOLARYNGOLOGY | Facility: CLINIC | Age: 75
End: 2025-08-28
Payer: MEDICARE

## 2025-08-28 LAB
ALBUMIN SERPL-MCNC: 4.1 G/DL (ref 3.6–5.1)
BUN SERPL-MCNC: 21 MG/DL (ref 7–25)
BUN/CREAT SERPL: 17 (CALC) (ref 6–22)
CALCIUM SERPL-MCNC: 9.1 MG/DL (ref 8.6–10.4)
CHLORIDE SERPL-SCNC: 101 MMOL/L (ref 98–110)
CO2 SERPL-SCNC: 23 MMOL/L (ref 20–32)
CREAT SERPL-MCNC: 1.26 MG/DL (ref 0.6–1)
EGFRCR SERPLBLD CKD-EPI 2021: 45 ML/MIN/1.73M2
GLUCOSE SERPL-MCNC: 164 MG/DL (ref 65–99)
PHOSPHATE SERPL-MCNC: 3.9 MG/DL (ref 2.1–4.3)
POTASSIUM SERPL-SCNC: 4.3 MMOL/L (ref 3.5–5.3)
PTH-INTACT SERPL-MCNC: 49 PG/ML (ref 16–77)
SODIUM SERPL-SCNC: 135 MMOL/L (ref 135–146)

## 2025-08-29 ENCOUNTER — APPOINTMENT (OUTPATIENT)
Dept: OTOLARYNGOLOGY | Facility: CLINIC | Age: 75
End: 2025-08-29
Payer: MEDICARE

## 2025-09-03 ENCOUNTER — TELEPHONE (OUTPATIENT)
Dept: PRIMARY CARE | Facility: CLINIC | Age: 75
End: 2025-09-03
Payer: MEDICARE

## 2025-09-15 ENCOUNTER — APPOINTMENT (OUTPATIENT)
Dept: PRIMARY CARE | Facility: CLINIC | Age: 75
End: 2025-09-15
Payer: MEDICARE

## 2025-09-19 ENCOUNTER — APPOINTMENT (OUTPATIENT)
Dept: ORTHOPEDIC SURGERY | Facility: CLINIC | Age: 75
End: 2025-09-19
Payer: MEDICARE

## 2025-10-23 ENCOUNTER — APPOINTMENT (OUTPATIENT)
Dept: OPHTHALMOLOGY | Facility: CLINIC | Age: 75
End: 2025-10-23
Payer: MEDICARE

## 2025-10-29 ENCOUNTER — APPOINTMENT (OUTPATIENT)
Dept: ENDOCRINOLOGY | Facility: CLINIC | Age: 75
End: 2025-10-29
Payer: MEDICARE

## 2025-12-24 ENCOUNTER — APPOINTMENT (OUTPATIENT)
Facility: CLINIC | Age: 75
End: 2025-12-24
Payer: MEDICARE

## 2026-01-08 ENCOUNTER — APPOINTMENT (OUTPATIENT)
Dept: PRIMARY CARE | Facility: CLINIC | Age: 76
End: 2026-01-08
Payer: MEDICARE

## 2026-06-25 ENCOUNTER — APPOINTMENT (OUTPATIENT)
Dept: CARDIOLOGY | Facility: CLINIC | Age: 76
End: 2026-06-25
Payer: MEDICARE

## (undated) DEVICE — Device

## (undated) DEVICE — REST, HEAD, BAGEL, 9 IN

## (undated) DEVICE — DRESSING, ISLAND, TELFA, 4 X 5 IN

## (undated) DEVICE — TUBING, SUCTION, CONNECTING, NON-CONDUCTIVE, SURE GRIP CONNECTORS, 3/16 IN X 10 FT

## (undated) DEVICE — PROBE, MONOPOLAR, 45MM, ANGLED 30 DEGREES

## (undated) DEVICE — BAG, DRAINAGE, ANTI-REFLUX CHAMBER, 2000ML

## (undated) DEVICE — SYRINGE, LUER LOCK, 12ML

## (undated) DEVICE — NEEDLE, ELECTRODE, ELECTROSURGICAL, INSULATED

## (undated) DEVICE — SYRINGE, 10 CC, LUER LOCK

## (undated) DEVICE — COVER, TABLE, 44 X 75 IN, DISPOSABLE, LF, STERILE

## (undated) DEVICE — GLOVE, SURGICAL, PROTEXIS PI , 7.5, PF, LF

## (undated) DEVICE — SLEEVE, VASO PRESS, CALF GARMENT, MEDIUM, GREEN

## (undated) DEVICE — INSERT, CLAMP, SURGICAL, SOFT/TRACTION, STEALTH, 1 MM

## (undated) DEVICE — APPLICATOR, PREP, CHLORAPREP, W/ORANGE TINT, 10.5ML

## (undated) DEVICE — GLOVE, SURGICAL, PROTEXIS PI BLUE W/NEUTHERA, 8.0, PF, LF

## (undated) DEVICE — DRAPE, SOFT THYROID, 77 X 121IN, STERILE

## (undated) DEVICE — SUTURE, SILK, 2, BLACK BR, SUTUPAK, LF

## (undated) DEVICE — STRIP, SKIN CLOSURE, STERI STRIP, REINFORCED, 0.5 X 4 IN

## (undated) DEVICE — SOLUTION, IRRIGATION, X RX SODIUM CHL 0.9%, 1000ML BTL

## (undated) DEVICE — PROTECTOR, NERVE, ULNAR, PINK

## (undated) DEVICE — MARKER, SKIN, DUAL TIP INK W/9 LABEL AND REMOVABLE TIME OUT SLEEVE

## (undated) DEVICE — NEEDLE, HYPODERMIC, 25 G X 1.5 IN, A BEVEL, STERILE

## (undated) DEVICE — SUTURE, VICRYL, 3-0, 27 IN, SH

## (undated) DEVICE — LOOP, BIPOLAR CUTTING, 24/26 FR, F/URO, 0.35MM, STERILE

## (undated) DEVICE — DRAIN, WOUND, FLAT, HUBLESS, FULL LENGTH PERFORATION, 10 MM X 20 CM, SILICONE

## (undated) DEVICE — PREP TRAY, VAGINAL, SKIN SCRUB, PVP-1 PAINT & 110ML PV, LF

## (undated) DEVICE — SUTURE, PROLENE, 6-0, 30 IN, BV-1 BV-1

## (undated) DEVICE — PREP TRAY, SKIN, DRY, W/GLOVES

## (undated) DEVICE — EVACUATOR, WOUND, SUCTION, CLOSED, JACKSON-PRATT, 100 CC, SILICONE

## (undated) DEVICE — CLIP, LIGATING, W/ADHESIVE, WIDE SLOT, SMALL, TITANIUM

## (undated) DEVICE — IRRIGATION SET, CYSTOSCOPY, TURP, Y, CONTINUOUS, 81 IN

## (undated) DEVICE — SUTURE, PROLENE, 2-0, 30 IN, SH, BLUE

## (undated) DEVICE — SUTURE, SILK, 2-0, 30 IN, SH, BLACK

## (undated) DEVICE — WAX, BONE, 2.5 GM

## (undated) DEVICE — BAG, DRAINAGE, SAFETY FLOW, OUTLET DEVICE

## (undated) DEVICE — PROBE, STIMULATOR, MONOPOLAR, FLUSH TIP, PRASS, W/HANDLE, STANDARD

## (undated) DEVICE — SUTURE, PLAIN, 5-0, 18 IN, PC1, YELLOW

## (undated) DEVICE — TOWEL PACK, STERILE, 4/PACK, BLUE

## (undated) DEVICE — GLOVE, SURGICAL, PROTEXIS PI MICRO, 7.5, PF, LF

## (undated) DEVICE — STOPCOCK, SAN ANTONIO, W/MODIFIED FITTING

## (undated) DEVICE — COUNTER, NEEDLE, FOAM BLOCK, W/MAGNET, W/BLADE GUARD, 10 COUNT, RED, LF

## (undated) DEVICE — HOLDER, CATHETER, LEGBAND, ADULT, 2 IN, LF

## (undated) DEVICE — DRAPE, MAGENTIC INSTRUMENT, 12X16

## (undated) DEVICE — ADHESIVE, SKIN, MASTISOL, 2/3 CC VIAL

## (undated) DEVICE — SPONGE, DISSECTING, PEANUT, 3/8 IN, XRAY DETECTABLE

## (undated) DEVICE — MANIFOLD, 4 PORT NEPTUNE STANDARD

## (undated) DEVICE — COLLECTION BAG, FLUID, NON-STERILE

## (undated) DEVICE — DRESSING, TRANSPARENT, TEGADERM, 2-3/8 X 2-3/4 IN

## (undated) DEVICE — SPONGE, DISSECTOR, PEANUT, 3/8, STERILE 5 FOAM HOLDER"

## (undated) DEVICE — CATHETER, URETHRAL, FOLEY, 2 WAY, BARDEX LUBRICATH, SHORT LENGTH, 16 FR, 5 CC, LATEX

## (undated) DEVICE — SUTURE, POLYSORB, 3-0, 30 IN, V20, UNDYED

## (undated) DEVICE — GOWN, SURGICAL, SMARTGOWN, XLARGE, STERILE

## (undated) DEVICE — ELECTRODE, TRI NEEDLE, GREEN 15X1000MM

## (undated) DEVICE — ELECTRODE, LARYNGEAL SELECT, ID 6-7 MM

## (undated) DEVICE — PAD, GROUNDING, ELECTROSURGICAL, W/9 FT CABLE, POLYHESIVE II, ADULT, LF

## (undated) DEVICE — CONTAINER, SPECIMEN, 120 ML, STERILE

## (undated) DEVICE — SYRINGE, 5 CC, LUER LOCK

## (undated) DEVICE — DRAPE, SHEET, FAN FOLDED, HALF, 44 X 58 IN, DISPOSABLE, LF, STERILE

## (undated) DEVICE — CAUTERY, PENCIL, PUSH BUTTON, SMOKE EVAC, 70MM

## (undated) DEVICE — DISSECTOR, EXACT, LIGASURE

## (undated) DEVICE — MARKER, STANDARD TIP, STERILE

## (undated) DEVICE — CATHETER TRAY, SURESTEP, 16FR, URINE METER W/STATLOCK

## (undated) DEVICE — DRESSING, NON-ADHERENT, TELFA, OUCHLESS, 3 X 8 IN, STERILE

## (undated) DEVICE — DRAPE, INCISE, ANTIMICROBIAL, IOBAN 2, LARGE, 17 X 23 IN, DISPOSABLE, STERILE

## (undated) DEVICE — SLEEVE, SURGICAL, 21.5 X 5.5 IN, LF, STERILE

## (undated) DEVICE — DRAPE, U-DRAPE, NON STERILE

## (undated) DEVICE — BOWL, BASIN, 32 OZ, STERILE

## (undated) DEVICE — HOLSTER, JET SAFETY

## (undated) DEVICE — CLIP, LIGATING, W/ADHESIVE PAD, MEDIUM, TITANIUM

## (undated) DEVICE — SUTURE, MONOCRYL, 4-0, 18 IN, PS2, UNDYED

## (undated) DEVICE — SUTURE, VICRYL, 3-0,18 IN, SH, UNDYED

## (undated) DEVICE — ADHESIVE, SKIN, DERMABOND ADVANCED, 15CM, PEN-STYLE

## (undated) DEVICE — SPONGE GAUZE, XRAY RFD, 8X4 12 PLY

## (undated) DEVICE — SPONGE, HEMOSTATIC, CELLULOSE, SURGICEL, 4 X 8 IN

## (undated) DEVICE — BALL, COTTON, MEDIUM, STERILE

## (undated) DEVICE — COVER, CART, 45 X 27 X 48 IN, CLEAR